# Patient Record
Sex: MALE | Race: WHITE | NOT HISPANIC OR LATINO | Employment: FULL TIME | URBAN - METROPOLITAN AREA
[De-identification: names, ages, dates, MRNs, and addresses within clinical notes are randomized per-mention and may not be internally consistent; named-entity substitution may affect disease eponyms.]

---

## 2017-01-25 ENCOUNTER — TELEPHONE (OUTPATIENT)
Dept: FAMILY MEDICINE | Facility: CLINIC | Age: 32
End: 2017-01-25

## 2017-01-25 NOTE — TELEPHONE ENCOUNTER
Dr. Acuna,    Patient with stomach flu symptoms.  Has vomited yesterday once and had 2 diarrhea stools today.  Has not been to work in 3 days.  Needing work note. I have started for you completion.Patient to be called when note is ready and he will  at our .  Patient advised to do good hand washing techniques and treat symptoms and stay well hydrated.  If not better in a few days to be seen.  Ayse ARANDA RN

## 2017-01-25 NOTE — TELEPHONE ENCOUNTER
Reason for call:  Patient reporting a symptom    Symptom or request: Pt has been vomiting, having diarrhea, body aches and fever X 4 days.  He wants to know if he should be seen in clinic?    Duration (how long have symptoms been present): 4 days    Have you been treated for this before? Yes    Additional comments:     Phone Number patient can be reached at:  Home number on file [phone] (home)    Best Time:  any    Can we leave a detailed message on this number:  YES    Call taken on 1/25/2017 at 2:41 PM by Becca Lemos

## 2017-01-25 NOTE — Clinical Note
January 25, 2017      Pop Garcia  [address]  [place] [address]      To Whom This May Concern:    Please excuse the patient from work due to an acute illness for the week of 1/23/17.   This is by patient report. He was not seen in clinic.     Thank you,      Sincerely,  Trae Acuna MD    Your [facility] Care Team

## 2017-01-26 ENCOUNTER — APPOINTMENT (OUTPATIENT)
Dept: GENERAL RADIOLOGY | Facility: CLINIC | Age: 32
End: 2017-01-26
Attending: FAMILY MEDICINE
Payer: COMMERCIAL

## 2017-01-26 ENCOUNTER — HOSPITAL ENCOUNTER (EMERGENCY)
Facility: CLINIC | Age: 32
Discharge: HOME | End: 2017-01-26
Attending: FAMILY MEDICINE | Admitting: FAMILY MEDICINE
Payer: COMMERCIAL

## 2017-01-26 VITALS
WEIGHT: 315 LBS | DIASTOLIC BLOOD PRESSURE: 93 MMHG | BODY MASS INDEX: 45.62 KG/M2 | HEART RATE: 123 BPM | OXYGEN SATURATION: 95 % | RESPIRATION RATE: 24 BRPM | TEMPERATURE: 101.3 F | SYSTOLIC BLOOD PRESSURE: 151 MMHG

## 2017-01-26 DIAGNOSIS — R50.9 FEBRILE ILLNESS, ACUTE: ICD-10-CM

## 2017-01-26 LAB
ALBUMIN SERPL-MCNC: 3.6 G/DL (ref 3.4–5)
ALBUMIN UR-MCNC: >600 MG/DL
ALP SERPL-CCNC: 81 U/L (ref 40–150)
ALT SERPL W P-5'-P-CCNC: 31 U/L (ref 0–70)
ANION GAP SERPL CALCULATED.3IONS-SCNC: 9 MMOL/L (ref 3–14)
APPEARANCE UR: ABNORMAL
AST SERPL W P-5'-P-CCNC: 28 U/L (ref 0–45)
BASOPHILS # BLD AUTO: 0 10E9/L (ref 0–0.2)
BASOPHILS NFR BLD AUTO: 0.2 %
BILIRUB DIRECT SERPL-MCNC: 0.1 MG/DL (ref 0–0.2)
BILIRUB SERPL-MCNC: 0.4 MG/DL (ref 0.2–1.3)
BILIRUB UR QL STRIP: NEGATIVE
BUN SERPL-MCNC: 12 MG/DL (ref 7–30)
CALCIUM SERPL-MCNC: 8.9 MG/DL (ref 8.5–10.1)
CHLORIDE SERPL-SCNC: 101 MMOL/L (ref 94–109)
CK SERPL-CCNC: 330 U/L (ref 30–300)
CO2 SERPL-SCNC: 24 MMOL/L (ref 20–32)
COLOR UR AUTO: YELLOW
CREAT SERPL-MCNC: 0.8 MG/DL (ref 0.66–1.25)
DEPRECATED S PYO AG THROAT QL EIA: NORMAL
DEPRECATED S PYO AG THROAT QL EIA: NORMAL
DIFFERENTIAL METHOD BLD: ABNORMAL
EOSINOPHIL # BLD AUTO: 0.1 10E9/L (ref 0–0.7)
EOSINOPHIL NFR BLD AUTO: 0.6 %
ERYTHROCYTE [DISTWIDTH] IN BLOOD BY AUTOMATED COUNT: 13.2 % (ref 10–15)
FLUAV+FLUBV AG SPEC QL: ABNORMAL
FLUAV+FLUBV AG SPEC QL: ABNORMAL
FLUAV+FLUBV AG SPEC QL: NORMAL
FLUAV+FLUBV AG SPEC QL: NORMAL
GFR SERPL CREATININE-BSD FRML MDRD: ABNORMAL ML/MIN/1.7M2
GLUCOSE SERPL-MCNC: 115 MG/DL (ref 70–99)
GLUCOSE UR STRIP-MCNC: NEGATIVE MG/DL
HCT VFR BLD AUTO: 50.1 % (ref 40–53)
HGB BLD-MCNC: 17.4 G/DL (ref 13.3–17.7)
HGB UR QL STRIP: ABNORMAL
IMM GRANULOCYTES # BLD: 0 10E9/L (ref 0–0.4)
IMM GRANULOCYTES NFR BLD: 0.3 %
KETONES UR STRIP-MCNC: NEGATIVE MG/DL
LACTATE BLD-SCNC: 1.5 MMOL/L (ref 0.7–2.1)
LEUKOCYTE ESTERASE UR QL STRIP: NEGATIVE
LYMPHOCYTES # BLD AUTO: 1 10E9/L (ref 0.8–5.3)
LYMPHOCYTES NFR BLD AUTO: 7.7 %
MCH RBC QN AUTO: 29.7 PG (ref 26.5–33)
MCHC RBC AUTO-ENTMCNC: 34.7 G/DL (ref 31.5–36.5)
MCV RBC AUTO: 86 FL (ref 78–100)
MICRO REPORT STATUS: NORMAL
MICRO REPORT STATUS: NORMAL
MONOCYTES # BLD AUTO: 1.6 10E9/L (ref 0–1.3)
MONOCYTES NFR BLD AUTO: 13.2 %
MUCOUS THREADS #/AREA URNS LPF: PRESENT /LPF
NEUTROPHILS # BLD AUTO: 9.7 10E9/L (ref 1.6–8.3)
NEUTROPHILS NFR BLD AUTO: 78 %
NITRATE UR QL: NEGATIVE
PH UR STRIP: 6 PH (ref 5–7)
PLATELET # BLD AUTO: 177 10E9/L (ref 150–450)
POTASSIUM SERPL-SCNC: 4 MMOL/L (ref 3.4–5.3)
PROT SERPL-MCNC: 8.3 G/DL (ref 6.8–8.8)
RBC # BLD AUTO: 5.85 10E12/L (ref 4.4–5.9)
RBC #/AREA URNS AUTO: 0 /HPF (ref 0–2)
SODIUM SERPL-SCNC: 134 MMOL/L (ref 133–144)
SP GR UR STRIP: 1.02 (ref 1–1.03)
SPECIMEN SOURCE: ABNORMAL
SPECIMEN SOURCE: NORMAL
SQUAMOUS #/AREA URNS AUTO: <1 /HPF (ref 0–1)
URN SPEC COLLECT METH UR: ABNORMAL
UROBILINOGEN UR STRIP-MCNC: NORMAL MG/DL (ref 0–2)
WBC # BLD AUTO: 12.4 10E9/L (ref 4–11)
WBC #/AREA URNS AUTO: 1 /HPF (ref 0–2)

## 2017-01-26 PROCEDURE — 96375 TX/PRO/DX INJ NEW DRUG ADDON: CPT

## 2017-01-26 PROCEDURE — 96361 HYDRATE IV INFUSION ADD-ON: CPT

## 2017-01-26 PROCEDURE — 87880 STREP A ASSAY W/OPTIC: CPT | Performed by: FAMILY MEDICINE

## 2017-01-26 PROCEDURE — 87081 CULTURE SCREEN ONLY: CPT | Performed by: FAMILY MEDICINE

## 2017-01-26 PROCEDURE — 99285 EMERGENCY DEPT VISIT HI MDM: CPT | Mod: 25

## 2017-01-26 PROCEDURE — 85025 COMPLETE CBC W/AUTO DIFF WBC: CPT | Performed by: FAMILY MEDICINE

## 2017-01-26 PROCEDURE — 87040 BLOOD CULTURE FOR BACTERIA: CPT | Performed by: FAMILY MEDICINE

## 2017-01-26 PROCEDURE — 83605 ASSAY OF LACTIC ACID: CPT | Performed by: FAMILY MEDICINE

## 2017-01-26 PROCEDURE — 80076 HEPATIC FUNCTION PANEL: CPT | Performed by: FAMILY MEDICINE

## 2017-01-26 PROCEDURE — 25000128 H RX IP 250 OP 636: Performed by: FAMILY MEDICINE

## 2017-01-26 PROCEDURE — 87804 INFLUENZA ASSAY W/OPTIC: CPT | Performed by: FAMILY MEDICINE

## 2017-01-26 PROCEDURE — 80048 BASIC METABOLIC PNL TOTAL CA: CPT | Performed by: FAMILY MEDICINE

## 2017-01-26 PROCEDURE — 71020 XR CHEST 2 VW: CPT

## 2017-01-26 PROCEDURE — 82550 ASSAY OF CK (CPK): CPT | Performed by: FAMILY MEDICINE

## 2017-01-26 PROCEDURE — 99284 EMERGENCY DEPT VISIT MOD MDM: CPT | Performed by: FAMILY MEDICINE

## 2017-01-26 PROCEDURE — 25000125 ZZHC RX 250: Performed by: FAMILY MEDICINE

## 2017-01-26 PROCEDURE — 96374 THER/PROPH/DIAG INJ IV PUSH: CPT

## 2017-01-26 PROCEDURE — 81001 URINALYSIS AUTO W/SCOPE: CPT | Performed by: FAMILY MEDICINE

## 2017-01-26 RX ORDER — KETOROLAC TROMETHAMINE 30 MG/ML
30 INJECTION, SOLUTION INTRAMUSCULAR; INTRAVENOUS ONCE
Status: COMPLETED | OUTPATIENT
Start: 2017-01-26 | End: 2017-01-26

## 2017-01-26 RX ORDER — NAPROXEN 500 MG/1
500 TABLET ORAL 2 TIMES DAILY WITH MEALS
Qty: 16 TABLET | Refills: 0 | Status: SHIPPED | OUTPATIENT
Start: 2017-01-26 | End: 2017-02-03

## 2017-01-26 RX ORDER — HYDROCODONE BITARTRATE AND ACETAMINOPHEN 5; 325 MG/1; MG/1
1-2 TABLET ORAL EVERY 4 HOURS PRN
Qty: 4 TABLET | Refills: 0 | Status: SHIPPED | OUTPATIENT
Start: 2017-01-26 | End: 2018-01-25

## 2017-01-26 RX ORDER — SODIUM CHLORIDE 9 MG/ML
1000 INJECTION, SOLUTION INTRAVENOUS CONTINUOUS
Status: DISCONTINUED | OUTPATIENT
Start: 2017-01-26 | End: 2017-01-26 | Stop reason: HOSPADM

## 2017-01-26 RX ORDER — LORAZEPAM 2 MG/ML
1 INJECTION INTRAMUSCULAR ONCE
Status: COMPLETED | OUTPATIENT
Start: 2017-01-26 | End: 2017-01-26

## 2017-01-26 RX ORDER — ONDANSETRON 2 MG/ML
4 INJECTION INTRAMUSCULAR; INTRAVENOUS ONCE
Status: COMPLETED | OUTPATIENT
Start: 2017-01-26 | End: 2017-01-26

## 2017-01-26 RX ADMIN — LORAZEPAM 1 MG: 2 INJECTION INTRAMUSCULAR; INTRAVENOUS at 10:54

## 2017-01-26 RX ADMIN — ONDANSETRON 4 MG: 2 INJECTION INTRAMUSCULAR; INTRAVENOUS at 10:05

## 2017-01-26 RX ADMIN — SODIUM CHLORIDE 1000 ML: 9 INJECTION, SOLUTION INTRAVENOUS at 11:11

## 2017-01-26 RX ADMIN — KETOROLAC TROMETHAMINE 30 MG: 30 INJECTION, SOLUTION INTRAMUSCULAR at 10:06

## 2017-01-26 RX ADMIN — SODIUM CHLORIDE 1000 ML: 9 INJECTION, SOLUTION INTRAVENOUS at 09:28

## 2017-01-26 NOTE — ED NOTES
fever since Sunday, fatigue and body aches, no abd pain, 2 episodes of diarrhea, nausea and appetite loss, HA, low back ache

## 2017-01-26 NOTE — ED AVS SNAPSHOT
[facility] Emergency Department    [address] [address]    Phone:  [phone]    Fax:  [phone]                                       Pop Garcia   MRN: [id number]    Department:  [facility] Emergency Department   Date of Visit:  1/26/2017           Patient Information     Date Of Birth          1985        Your diagnoses for this visit were:     Febrile illness, acute Unclear cause.  xray, labs reassuring.  Return for worsening and recheck for persistent fever within 48 hours. recheck urine for protein.  Stay hydrated with >64 oz/day - avoid caffeine.   tylenol or motrin for fever.  Muscle aches are common with this, but low back pain could be referred from elsewhere such as inside the abdomen.  I would strongly consider CT abd and pelvis (despite normal abdominal exam) if fever persists    Febrile illness, acute        You were seen by Aurelio Dumont MD.      Follow-up Information     Follow up with Clinic, [facility] In 2 days.    Contact information:    [address] [place] [address] [phone]          Follow up with [facility] Emergency Department.    Specialty:  EMERGENCY MEDICINE    Why:  As needed, If symptoms worsen    Contact information:    [address] [place] [address] [phone]    Additional information:    The medical center is located at   [address] [place] [address]. (between [address] and   [address] in [place], [place] north   of [place]).        Discharge Instructions           ICD-10-CM    1. Febrile illness, acute R50.9 CK total    Unclear cause.  xray, labs reassuring.  Return for worsening and recheck for persistent fever within 48 hours. recheck urine for protein.  Stay hydrated with >64 oz/day - avoid caffeine.   tylenol or motrin for fever.  Muscle aches are common with this, but low back pain could be referred from elsewhere such as inside the abdomen.  I would strongly consider CT abd and  pelvis (despite normal abdominal exam) if fever persists   2. Febrile illness, acute R50.9 CK total         Febrile Illness, Uncertain Cause (Adult)  You have a fever, but the cause is not certain. A fever is a natural reaction of the body to an illness such as infection due to a virus or bacteria. In most cases, the temperature itself is not harmful. It actually helps the body fight infections. A fever does not need to be treated unless you feel very uncomfortable.  Sometimes a fever can be an early sign of a more serious infection, so make sure to follow up if your condition worsens.  Home care  Unless given other instructions by your healthcare provider, follow these guidelines when caring for yourself at home.  General care    If your symptoms are severe, rest at home for the first 2 to 3 days. When you resume activity, don't let yourself get too tired.    Do not smoke. Also avoid being exposed to secondhand smoke.    Your appetite may be poor, so a light diet is fine. Avoid dehydration by drinking 6 to 8 glasses of fluids per day (such as water, soft drinks, sports drinks, juices, tea, or soup). Extra fluids will help loosen secretions in the nose and lungs.  Medicines    You can take acetaminophen or ibuprofen for pain, unless you were given a different fever-reducing/pain medicine to use. (Note: If you have chronic liver or kidney disease or have ever had a stomach ulcer or gastrointestinal bleeding, talk with your healthcare provider before using these medicines. Also talk to your provider if you are taking medicine to prevent blood clots.) Aspirin should never be given to anyone younger than 18 years of age who is ill with a viral infection or fever. It may cause severe liver or brain damage.    If you were given antibiotics, take them until they are used up, or your healthcare provider tells you to stop. It is important to finish the antibiotics even though you feel better. This is to make sure the  infection has cleared. Be aware that antibiotics are not usually given for a fever with an unknown cause.    Over-the-counter medicines will not shorten the duration of the illness. However, they may be helpful for the following symptoms: cough, sore throat, or nasal and sinus congestion. Ask your pharmacist for product suggestions. (Note: Do not use decongestants if you have high blood pressure.)  Follow-up care  Follow up with your healthcare provider, or as advised.    If a culture was done, you will be notified if your treatment needs to be changed. You can call as directed for the results.    If X-rays, a CT, or an ultrasound were done, a specialist will review them. You will be notified of any findings that may affect your care.  Call 911  Contact emergency services right away if any of these occur:    Trouble breathing or swallowing, or wheezing    Chest pain    Confusion    Extreme drowsiness or trouble awakening    Fainting or loss of consciousness    Rapid heart rate    Low blood pressure    Vomiting blood, or large amounts of blood in stool    Seizure  When to seek medical advice  Call your healthcare provider right away if any of these occur:    Cough with lots of colored sputum (mucus) or blood in your sputum    Severe headache    Face, neck, throat, or ear pain    Feeling drowsy    Abdominal pain    Repeated vomiting or diarrhea    Joint pain or a new rash    Burning when urinating    Fever of 100.4 F (38 C) or higher, that does not get better after taking fever-reducing medicine    Feeling weak or dizzy    4700-1478 [employer]. [address] [place] [address], [place] [address]. All rights reserved. This information is not intended as a substitute for professional medical care. Always follow your healthcare professional's instructions.          24 Hour Appointment Hotline       To make an appointment at any [facility], call [phone] ([phone]). If you don't have a family doctor  or clinic, we will help you find one. [facility] are conveniently located to serve the needs of you and your family.             Review of your medicines      START taking        Dose / Directions Last dose taken    HYDROcodone-acetaminophen 5-325 MG per tablet   Commonly known as:  NORCO   Dose:  1-2 tablet   Quantity:  4 tablet        Take 1-2 tablets by mouth every 4 hours as needed for moderate to severe pain   Refills:  0        naproxen 500 MG tablet   Commonly known as:  NAPROSYN   Dose:  500 mg   Quantity:  16 tablet        Take 1 tablet (500 mg) by mouth 2 times daily (with meals) for 8 days   Refills:  0                Prescriptions were sent or printed at these locations (2 Prescriptions)                   [facility] [place], [place] - [address]   [address] [address]    Telephone:  [phone]   Fax:  [phone]   Hours:                  E-Prescribed (1 of 2)         naproxen (NAPROSYN) 500 MG tablet                 Printed at Department/Unit printer (1 of 2)         HYDROcodone-acetaminophen (NORCO) 5-325 MG per tablet                Procedures and tests performed during your visit     Procedure/Test Number of Times Performed    Basic metabolic panel 1    Beta strep group A culture 1    Blood culture 2    CBC with platelets differential 1    CK total 1    Hepatic panel 1    Influenza A/B antigen 2    Lactic acid whole blood 1    Rapid Strep Screen 1    Rapid strep screen 1    UA with Microscopic reflex to Culture 1    XR Chest 2 Views 1      Orders Needing Specimen Collection     None      Pending Results     Date and Time Order Name Status Description    1/26/2017 1224 CK total In process     1/26/2017 1030 Beta strep group A culture In process     1/26/2017 0956 Blood culture In process     1/26/2017 0956 Blood culture In process             Pending Culture Results     Date and Time Order Name Status Description    1/26/2017 1030 Beta strep group A culture  In process     1/26/2017 0956 Blood culture In process     1/26/2017 0956 Blood culture In process        Test Results from your hospital stay           1/26/2017  9:30 AM - Interface, Flexilab Results      Component Results     Component Value Ref Range & Units Status    WBC 12.4 (H) 4.0 - 11.0 10e9/L Final    RBC Count 5.85 4.4 - 5.9 10e12/L Final    Hemoglobin 17.4 13.3 - 17.7 g/dL Final    Hematocrit 50.1 40.0 - 53.0 % Final    MCV 86 78 - 100 fl Final    MCH 29.7 26.5 - 33.0 pg Final    MCHC 34.7 31.5 - 36.5 g/dL Final    RDW 13.2 10.0 - 15.0 % Final    Platelet Count 177 150 - 450 10e9/L Final    Diff Method Automated Method  Final    % Neutrophils 78.0 % Final    % Lymphocytes 7.7 % Final    % Monocytes 13.2 % Final    % Eosinophils 0.6 % Final    % Basophils 0.2 % Final    % Immature Granulocytes 0.3 % Final    Absolute Neutrophil 9.7 (H) 1.6 - 8.3 10e9/L Final    Absolute Lymphocytes 1.0 0.8 - 5.3 10e9/L Final    Absolute Monocytes 1.6 (H) 0.0 - 1.3 10e9/L Final    Absolute Eosinophils 0.1 0.0 - 0.7 10e9/L Final    Absolute Basophils 0.0 0.0 - 0.2 10e9/L Final    Abs Immature Granulocytes 0.0 0 - 0.4 10e9/L Final         1/26/2017  9:44 AM - Interface, Flexilab Results      Component Results     Component Value Ref Range & Units Status    Sodium 134 133 - 144 mmol/L Final    Potassium 4.0 3.4 - 5.3 mmol/L Final    Chloride 101 94 - 109 mmol/L Final    Carbon Dioxide 24 20 - 32 mmol/L Final    Anion Gap 9 3 - 14 mmol/L Final    Glucose 115 (H) 70 - 99 mg/dL Final    Urea Nitrogen 12 7 - 30 mg/dL Final    Creatinine 0.80 0.66 - 1.25 mg/dL Final    GFR Estimate >90  Non  GFR Calc   >60 mL/min/1.7m2 Final    GFR Estimate If Black >90   GFR Calc   >60 mL/min/1.7m2 Final    Calcium 8.9 8.5 - 10.1 mg/dL Final         1/26/2017  9:30 AM - Interface, Flexilab Results      Component Results     Component Value Ref Range & Units Status    Lactic Acid 1.5 0.7 - 2.1 mmol/L Final          1/26/2017 11:32 AM - Interface, Flexilab Results      Component Results     Component    Specimen Description    Throat    Rapid Strep A Screen    Canceled, Test credited Specimen improperly labeled    Micro Report Status    FINAL 01/26/2017 1/26/2017 11:42 AM - Interface, Flexilab Results      Component Results     Component Value Ref Range & Units Status    Color Urine Yellow  Final    Appearance Urine Slightly Cloudy  Final    Glucose Urine Negative NEG mg/dL Final    Bilirubin Urine Negative NEG Final    Ketones Urine Negative NEG mg/dL Final    Specific Gravity Urine 1.020 1.003 - 1.035 Final    Blood Urine Trace (A) NEG Final    pH Urine 6.0 5.0 - 7.0 pH Final    Protein Albumin Urine >600 (A) NEG mg/dL Final    Urobilinogen mg/dL Normal 0.0 - 2.0 mg/dL Final    Nitrite Urine Negative NEG Final    Leukocyte Esterase Urine Negative NEG Final    Source Midstream Urine  Final    WBC Urine 1 0 - 2 /HPF Final    RBC Urine 0 0 - 2 /HPF Final    Squamous Epithelial /HPF Urine <1 0 - 1 /HPF Final    Mucous Urine Present (A) NEG /LPF Final         1/26/2017 10:26 AM - Interface, Radiant Ib      Narrative     XR CHEST 2 VW  1/26/2017 10:23 AM    HISTORY:  cough, fever    COMPARISON:  2/20/2012        Impression     IMPRESSION:  Negative.     ELENA BLANCHARD MD         1/26/2017 10:30 AM - Interface, Flexilab Results      Component Results     Component Value Ref Range & Units Status    Bilirubin Direct 0.1 0.0 - 0.2 mg/dL Final    Bilirubin Total 0.4 0.2 - 1.3 mg/dL Final    Albumin 3.6 3.4 - 5.0 g/dL Final    Protein Total 8.3 6.8 - 8.8 g/dL Final    Alkaline Phosphatase 81 40 - 150 U/L Final    ALT 31 0 - 70 U/L Final    AST 28 0 - 45 U/L Final         1/26/2017 11:17 AM - Interface, Flexilab Results      Component Results     Component Value Ref Range & Units Status    Influenza A/B Agn Specimen Nasal  Final    Influenza A  NEG Final    Canceled, Test credited   Specimen improperly labeled   Test results  must be correlated with clinical data. If necessary, results   should be confirmed by a molecular assay or viral culture.   (A)    Influenza B  NEG Final    Canceled, Test credited   Specimen improperly labeled   Test results must be correlated with clinical data. If necessary, results   should be confirmed by a molecular assay or viral culture.   (A)         1/26/2017 11:04 AM - Interface, Flexilab Results         1/26/2017 11:05 AM - Interface, Flexilab Results         1/26/2017 11:13 AM - Interface, Flexilab Results      Component Results     Component Value Ref Range & Units Status    Influenza A/B Agn Specimen Nasopharyngeal  Final    Influenza A  NEG Final    Negative   Test results must be correlated with clinical data. If necessary, results   should be confirmed by a molecular assay or viral culture.      Influenza B  NEG Final    Negative   Test results must be correlated with clinical data. If necessary, results   should be confirmed by a molecular assay or viral culture.           1/26/2017 11:02 AM - Interface, Flexilab Results      Component Results     Component    Specimen Description    Throat    Rapid Strep A Screen    NEGATIVE: No Group A streptococcal antigen detected by immunoassay, await   culture report.      Micro Report Status    FINAL 01/26/2017 1/26/2017 11:07 AM - Interface, Flexilab Results         1/26/2017 12:32 PM - Interface, Flexilab Results                Thank you for choosing [facility]       Thank you for choosing [facility] for your care. Our goal is always to provide you with excellent care. Hearing back from our patients is one way we can continue to improve our services. Please take a few minutes to complete the written survey that you may receive in the mail after you visit with us. Thank you!        [employer] Information     [employer] lets you send messages to your doctor, view your test results, renew your prescriptions, schedule appointments and more. To sign up, go to  [email or web address] . Click on \"Log in\" on the left side of the screen, which will take you to the Welcome page. Then click on \"Sign up Now\" on the right side of the page.     You will be asked to enter the access code listed below, as well as some personal information. Please follow the directions to create your username and password.     Your access code is: [email or web address]  Expires: 2017  6:27 PM     Your access code will  in 90 days. If you need help or a new code, please call your [place] clinic or [phone].        Care EveryWhere ID     This is your Care EveryWhere ID. This could be used by other organizations to access your [place] medical records  [id number]        After Visit Summary       This is your record. Keep this with you and show to your community pharmacist(s) and doctor(s) at your next visit.                  "

## 2017-01-26 NOTE — LETTER
[facility] EMERGENCY DEPARTMENT  [address] [address]  [phone]    2017    Pop Garcia  [address] [address]  [phone] (home)     : 1985      To Whom it may concern:    Pop Garcia was seen in our Emergency Department today, 2017.  I expect his condition to improve over the next 3 days.  He may return to work/school when improved.    Sincerely,        Aurelio Dumont

## 2017-01-26 NOTE — ED AVS SNAPSHOT
[facility] Emergency Department    [address] [address]    Phone:  [phone]    Fax:  [phone]                                       Pop Garcia   MRN: [id number]    Department:  [facility] Emergency Department   Date of Visit:  1/26/2017           After Visit Summary Signature Page     I have received my discharge instructions, and my questions have been answered. I have discussed any challenges I see with this plan with the nurse or doctor.    ..........................................................................................................................................  Patient/Patient Representative Signature      ..........................................................................................................................................  Patient Representative Print Name and Relationship to Patient    ..................................................               ................................................  Date                                            Time    ..........................................................................................................................................  Reviewed by Signature/Title    ...................................................              ..............................................  Date                                                            Time

## 2017-01-26 NOTE — ED PROVIDER NOTES
History     Chief Complaint   Patient presents with     Fever     fever since Sunday, fatigue and body aches, no abd pain, 2 episodes of diarrhea, nausea and appetite loss, HA, low back ache     HPI  Pop Garcia is a 31 year old male who presents with onset of fever since monday AM - tactile warmth.  with low back pain as well as diffuse myalgias, weakness, fatigue, sweats and chills.  Nausea without vomiting. Tolerating liquids.  urinating - decreased.    diarrhea 1-2x on tuesday.  No blood or mucous.  normalized.    No recent antibiotics.  no bad food intakes.  No travel other than [place] in [place] and [place].  No contagious contacts.  unable to get comfortable. unable to sleep  chronic sinus symptoms.  headache - pressure sensation - facial .  No rhinorrhea  occl cough he attributes smoking  denies sympathomimetics    Past Surgical History   Procedure Laterality Date     Surgical history of -   1991     tonsils & adenoids     Gastroscopy,fl  10/2006     eosphagitis,  nonpatent sphincter     [place] screening  8/15/2012     Procedure: WAVESCAN SCREENING;  BILATERAL WAVESCAN SCREENING ;  Surgeon: Luis Johnson MD;  Location: [facility]     Lasik customvue bilateral  8/15/2012     Procedure: LASIK CUSTOMVUE BILATERAL;  BILATERAL LASIK CUSTOMVUE  WITH INTRALASE ;  Surgeon: Luis Johnson MD;  Location: [facility]     Social History   Substance Use Topics     Smoking status: Current Every Day Smoker -- 0.50 packs/day for 10 years     Types: Cigarettes     Smokeless tobacco: Former User     Types: Chew     Quit date: 04/01/2015      Comment: 1/2 ppd as of 1/5/2011     Alcohol Use: No         Patient Active Problem List   Diagnosis     Tobacco use disorder     Morbid obesity (H)     Reflux esophagitis     Left axis deviation     CARDIOVASCULAR SCREENING; LDL GOAL LESS THAN 130     Vitamin D deficiency     Attention deficit hyperactivity disorder (ADHD)     Marijuana use, continuous     Chronic frontal sinusitis      Chronic rhinitis     Current Outpatient Prescriptions   Medication Sig Dispense Refill     fluticasone (FLONASE) 50 MCG/ACT nasal spray Spray 1-2 sprays into both nostrils daily 16 g 11     Multiple Vitamin (MULTIVITAMIN) per tablet Take 1 tablet by mouth daily. 100 tablet 12      No Known Allergies    I have reviewed the Medications, Allergies, Past Medical and Surgical History, and Social History in the Epic system.    Review of Systems    No  ear pain  No chest pain, palpitations or shortness of breath  No abdominal pain, vomiting,  constipation or blood in the stool or black tarry stools  No dysuria, urgency,  frequency or hematuria  No new rashes  No headaches or vision change  No enlarged lymph nodes  Review of systems otherwise negative     Physical Exam   BP: 133/78 mmHg  Pulse: 125  Temp: 101.3  F (38.5  C)  Resp: 20  Weight: (!) 167.831 kg (370 lb)  SpO2: 94 %  Physical Exam   Constitutional: He appears distressed.   Eyes: Conjunctivae are normal.   Neck: Neck supple.   Cardiovascular: Normal rate and regular rhythm.  Exam reveals no gallop and no friction rub.    No murmur heard.  Pulmonary/Chest: Effort normal and breath sounds normal. No respiratory distress. He has no wheezes. He has no rales.   Abdominal: Soft. Bowel sounds are normal. He exhibits no distension. There is no tenderness. There is no rebound and no guarding.   Genitourinary: Prostate normal.   Musculoskeletal: He exhibits no edema.   Neurological: He is alert. He exhibits normal muscle tone.   Skin: No rash noted. He is diaphoretic.   low back full range of motion that is painless    ED Course   Procedures             Critical Care time:  none               Results for orders placed or performed during the hospital encounter of 01/26/17   XR Chest 2 Views    Narrative    XR CHEST 2 VW  1/26/2017 10:23 AM    HISTORY:  cough, fever    COMPARISON:  2/20/2012      Impression    IMPRESSION:  Negative.     ELENA BLANCHARD MD   CBC with  platelets differential   Result Value Ref Range    WBC 12.4 (H) 4.0 - 11.0 10e9/L    RBC Count 5.85 4.4 - 5.9 10e12/L    Hemoglobin 17.4 13.3 - 17.7 g/dL    Hematocrit 50.1 40.0 - 53.0 %    MCV 86 78 - 100 fl    MCH 29.7 26.5 - 33.0 pg    MCHC 34.7 31.5 - 36.5 g/dL    RDW 13.2 10.0 - 15.0 %    Platelet Count 177 150 - 450 10e9/L    Diff Method Automated Method     % Neutrophils 78.0 %    % Lymphocytes 7.7 %    % Monocytes 13.2 %    % Eosinophils 0.6 %    % Basophils 0.2 %    % Immature Granulocytes 0.3 %    Absolute Neutrophil 9.7 (H) 1.6 - 8.3 10e9/L    Absolute Lymphocytes 1.0 0.8 - 5.3 10e9/L    Absolute Monocytes 1.6 (H) 0.0 - 1.3 10e9/L    Absolute Eosinophils 0.1 0.0 - 0.7 10e9/L    Absolute Basophils 0.0 0.0 - 0.2 10e9/L    Abs Immature Granulocytes 0.0 0 - 0.4 10e9/L   Basic metabolic panel   Result Value Ref Range    Sodium 134 133 - 144 mmol/L    Potassium 4.0 3.4 - 5.3 mmol/L    Chloride 101 94 - 109 mmol/L    Carbon Dioxide 24 20 - 32 mmol/L    Anion Gap 9 3 - 14 mmol/L    Glucose 115 (H) 70 - 99 mg/dL    Urea Nitrogen 12 7 - 30 mg/dL    Creatinine 0.80 0.66 - 1.25 mg/dL    GFR Estimate >90  Non  GFR Calc   >60 mL/min/1.7m2    GFR Estimate If Black >90   GFR Calc   >60 mL/min/1.7m2    Calcium 8.9 8.5 - 10.1 mg/dL   Lactic acid whole blood   Result Value Ref Range    Lactic Acid 1.5 0.7 - 2.1 mmol/L   UA with Microscopic reflex to Culture   Result Value Ref Range    Color Urine Yellow     Appearance Urine Slightly Cloudy     Glucose Urine Negative NEG mg/dL    Bilirubin Urine Negative NEG    Ketones Urine Negative NEG mg/dL    Specific Gravity Urine 1.020 1.003 - 1.035    Blood Urine Trace (A) NEG    pH Urine 6.0 5.0 - 7.0 pH    Protein Albumin Urine >600 (A) NEG mg/dL    Urobilinogen mg/dL Normal 0.0 - 2.0 mg/dL    Nitrite Urine Negative NEG    Leukocyte Esterase Urine Negative NEG    Source Midstream Urine     WBC Urine 1 0 - 2 /HPF    RBC Urine 0 0 - 2 /HPF    Squamous  Epithelial /HPF Urine <1 0 - 1 /HPF    Mucous Urine Present (A) NEG /LPF   Hepatic panel   Result Value Ref Range    Bilirubin Direct 0.1 0.0 - 0.2 mg/dL    Bilirubin Total 0.4 0.2 - 1.3 mg/dL    Albumin 3.6 3.4 - 5.0 g/dL    Protein Total 8.3 6.8 - 8.8 g/dL    Alkaline Phosphatase 81 40 - 150 U/L    ALT 31 0 - 70 U/L    AST 28 0 - 45 U/L   CK total   Result Value Ref Range    CK Total 330 (H) 30 - 300 U/L   Rapid Strep Screen   Result Value Ref Range    Specimen Description Throat     Rapid Strep A Screen       Canceled, Test credited Specimen improperly labeled    Micro Report Status FINAL 01/26/2017    Influenza A/B antigen   Result Value Ref Range    Influenza A/B Agn Specimen Nasal     Influenza A (A) NEG     Canceled, Test credited   Specimen improperly labeled   Test results must be correlated with clinical data. If necessary, results   should be confirmed by a molecular assay or viral culture.      Influenza B (A) NEG     Canceled, Test credited   Specimen improperly labeled   Test results must be correlated with clinical data. If necessary, results   should be confirmed by a molecular assay or viral culture.     Influenza A/B antigen   Result Value Ref Range    Influenza A/B Agn Specimen Nasopharyngeal     Influenza A  NEG     Negative   Test results must be correlated with clinical data. If necessary, results   should be confirmed by a molecular assay or viral culture.      Influenza B  NEG     Negative   Test results must be correlated with clinical data. If necessary, results   should be confirmed by a molecular assay or viral culture.     Rapid strep screen   Result Value Ref Range    Specimen Description Throat     Rapid Strep A Screen       NEGATIVE: No Group A streptococcal antigen detected by immunoassay, await   culture report.      Micro Report Status FINAL 01/26/2017          Assessments & Plan (with Medical Decision Making)     MDM: Pop Garcia is a 31 year old male who presented with febrile  illness of unclear etiology. He has diffuse myalgias especially in the low back  but well below the level of the flank, with a normal prostate exam and negative abdominal exam and no genitourinary symptoms.  We discussed obtaining a CT of the [place] and pelvis to exclude retroperitoneal cause.  he declines this at this time but will return for worsening. He understands the potential risk of missing pelvic appendix or other findings that could be more significant. Again his abdominal exam is benign and he is non-toxic in appearance.  He will return immediately for worsening.  I've asked him to follow up for persistent fever within the next 24 to 48 hours . Blood cultures were obtained and are pending.   I have reviewed the nursing notes.    I have reviewed the findings, diagnosis, plan and need for follow up with the patient.    New Prescriptions    No medications on file       Final diagnoses:   Febrile illness, acute - Unclear cause.  xray, labs reassuring.  Return for worsening and recheck for persistent fever within 48 hours. recheck urine for protein.  Stay hydrated with >64 oz/day - avoid caffeine.   tylenol or motrin for fever.  Muscle aches are common with this, but low back pain could be referred from elsewhere such as inside the abdomen.  I would strongly consider CT abd and pelvis (despite normal abdominal exam) if fever persists       1/26/2017   [facility] EMERGENCY DEPARTMENT      Aurelio Dumont MD  01/26/17 [id number]

## 2017-01-26 NOTE — TELEPHONE ENCOUNTER
Detailed message was left for pt   Letting him know the letter requested was ready for  at the [facility]    And can be  between the hours of 7 am and 5 pm Monday thru Friday   If there are questions please call the care team at [phone]    Pat Soto  [facility] [place]

## 2017-01-26 NOTE — DISCHARGE INSTRUCTIONS
ICD-10-CM    1. Febrile illness, acute R50.9 CK total    Unclear cause.  xray, labs reassuring.  Return for worsening and recheck for persistent fever within 48 hours. recheck urine for protein.  Stay hydrated with >64 oz/day - avoid caffeine.   tylenol or motrin for fever.  Muscle aches are common with this, but low back pain could be referred from elsewhere such as inside the abdomen.  I would strongly consider CT abd and pelvis (despite normal abdominal exam) if fever persists   2. Febrile illness, acute R50.9 CK total         Febrile Illness, Uncertain Cause (Adult)  You have a fever, but the cause is not certain. A fever is a natural reaction of the body to an illness such as infection due to a virus or bacteria. In most cases, the temperature itself is not harmful. It actually helps the body fight infections. A fever does not need to be treated unless you feel very uncomfortable.  Sometimes a fever can be an early sign of a more serious infection, so make sure to follow up if your condition worsens.  Home care  Unless given other instructions by your healthcare provider, follow these guidelines when caring for yourself at home.  General care    If your symptoms are severe, rest at home for the first 2 to 3 days. When you resume activity, don't let yourself get too tired.    Do not smoke. Also avoid being exposed to secondhand smoke.    Your appetite may be poor, so a light diet is fine. Avoid dehydration by drinking 6 to 8 glasses of fluids per day (such as water, soft drinks, sports drinks, juices, tea, or soup). Extra fluids will help loosen secretions in the nose and lungs.  Medicines    You can take acetaminophen or ibuprofen for pain, unless you were given a different fever-reducing/pain medicine to use. (Note: If you have chronic liver or kidney disease or have ever had a stomach ulcer or gastrointestinal bleeding, talk with your healthcare provider before using these medicines. Also talk to your  provider if you are taking medicine to prevent blood clots.) Aspirin should never be given to anyone younger than 18 years of age who is ill with a viral infection or fever. It may cause severe liver or brain damage.    If you were given antibiotics, take them until they are used up, or your healthcare provider tells you to stop. It is important to finish the antibiotics even though you feel better. This is to make sure the infection has cleared. Be aware that antibiotics are not usually given for a fever with an unknown cause.    Over-the-counter medicines will not shorten the duration of the illness. However, they may be helpful for the following symptoms: cough, sore throat, or nasal and sinus congestion. Ask your pharmacist for product suggestions. (Note: Do not use decongestants if you have high blood pressure.)  Follow-up care  Follow up with your healthcare provider, or as advised.    If a culture was done, you will be notified if your treatment needs to be changed. You can call as directed for the results.    If X-rays, a CT, or an ultrasound were done, a specialist will review them. You will be notified of any findings that may affect your care.  Call 911  Contact emergency services right away if any of these occur:    Trouble breathing or swallowing, or wheezing    Chest pain    Confusion    Extreme drowsiness or trouble awakening    Fainting or loss of consciousness    Rapid heart rate    Low blood pressure    Vomiting blood, or large amounts of blood in stool    Seizure  When to seek medical advice  Call your healthcare provider right away if any of these occur:    Cough with lots of colored sputum (mucus) or blood in your sputum    Severe headache    Face, neck, throat, or ear pain    Feeling drowsy    Abdominal pain    Repeated vomiting or diarrhea    Joint pain or a new rash    Burning when urinating    Fever of 100.4 F (38 C) or higher, that does not get better after taking fever-reducing  medicine    Feeling weak or dizzy    8778-0951 [employer]. [address] [place] [address], [place] [address]. All rights reserved. This information is not intended as a substitute for professional medical care. Always follow your healthcare professional's instructions.

## 2017-01-26 NOTE — ED NOTES
Patient states body aches, chills, sweats, sinus pressure, weak and feverish (does not know how high temp went PTA)

## 2017-01-28 ENCOUNTER — HOSPITAL ENCOUNTER (EMERGENCY)
Facility: CLINIC | Age: 32
Discharge: HOME | End: 2017-01-28
Attending: NURSE PRACTITIONER | Admitting: NURSE PRACTITIONER
Payer: COMMERCIAL

## 2017-01-28 VITALS
DIASTOLIC BLOOD PRESSURE: 77 MMHG | BODY MASS INDEX: 45.62 KG/M2 | RESPIRATION RATE: 18 BRPM | TEMPERATURE: 99.7 F | SYSTOLIC BLOOD PRESSURE: 134 MMHG | OXYGEN SATURATION: 98 % | WEIGHT: 315 LBS

## 2017-01-28 DIAGNOSIS — J02.9 ACUTE PHARYNGITIS, UNSPECIFIED ETIOLOGY: ICD-10-CM

## 2017-01-28 LAB
BACTERIA SPEC CULT: NORMAL
DEPRECATED S PYO AG THROAT QL EIA: NORMAL
HETEROPH AB SER QL: NEGATIVE
MICRO REPORT STATUS: NORMAL
MICRO REPORT STATUS: NORMAL
SPECIMEN SOURCE: NORMAL
SPECIMEN SOURCE: NORMAL

## 2017-01-28 PROCEDURE — 99213 OFFICE O/P EST LOW 20 MIN: CPT

## 2017-01-28 PROCEDURE — 86308 HETEROPHILE ANTIBODY SCREEN: CPT | Performed by: NURSE PRACTITIONER

## 2017-01-28 PROCEDURE — 99213 OFFICE O/P EST LOW 20 MIN: CPT | Performed by: NURSE PRACTITIONER

## 2017-01-28 PROCEDURE — 87081 CULTURE SCREEN ONLY: CPT | Performed by: NURSE PRACTITIONER

## 2017-01-28 PROCEDURE — 87880 STREP A ASSAY W/OPTIC: CPT | Performed by: FAMILY MEDICINE

## 2017-01-28 PROCEDURE — 36415 COLL VENOUS BLD VENIPUNCTURE: CPT | Performed by: NURSE PRACTITIONER

## 2017-01-28 RX ORDER — SODIUM CHLORIDE 9 MG/ML
1000 INJECTION, SOLUTION INTRAVENOUS CONTINUOUS
Status: DISCONTINUED | OUTPATIENT
Start: 2017-01-28 | End: 2017-01-28

## 2017-01-28 RX ORDER — DEXAMETHASONE 4 MG/1
10 TABLET ORAL 2 TIMES DAILY WITH MEALS
Qty: 2.5 TABLET | Refills: 0 | Status: SHIPPED | OUTPATIENT
Start: 2017-01-28 | End: 2018-01-25

## 2017-01-28 ASSESSMENT — ENCOUNTER SYMPTOMS
ACTIVITY CHANGE: 1
APPETITE CHANGE: 1
UNEXPECTED WEIGHT CHANGE: 1
FEVER: 1
VOMITING: 0
WEAKNESS: 1
ABDOMINAL PAIN: 0
SORE THROAT: 1
FATIGUE: 1
DIARRHEA: 1
BACK PAIN: 0
BLOOD IN STOOL: 0
RESPIRATORY NEGATIVE: 1
CARDIOVASCULAR NEGATIVE: 1
MYALGIAS: 1
CHILLS: 1

## 2017-01-28 NOTE — DISCHARGE INSTRUCTIONS
Viral Pharyngitis (Sore Throat)    You (or your child, if your child is the patient) have pharyngitis (sore throat). This infection is caused by a virus. It can cause throat pain that is worse when swallowing, aching all over, headache, and fever. The infection may be spread by coughing, kissing, or touching others after touching your mouth or nose. Antibiotic medications do not work against viruses, so they are not used for treating this condition.  Home care    If your symptoms are severe, rest at home. Return to work or school when you feel well enough.     Drink plenty of fluids to avoid dehydration.    For children: Use acetaminophen for fever, fussiness or discomfort. In infants over six months of age, you may use ibuprofen instead of acetaminophen. (NOTE: If your child has chronic liver or kidney disease or ever had a stomach ulcer or GI bleeding, talk with your doctor before using these medicines.) (NOTE: Aspirin should never be used in anyone under 18 years of age who is ill with a fever. It may cause severe liver damage.)     For adults: You may use acetaminophen or ibuprofen to control pain or fever, unless another medicine was prescribed for this. (NOTE: If you have chronic liver or kidney disease or ever had a stomach ulcer or GI bleeding, talk with your doctor before using these medicines.)    Throat lozenges or numbing throat sprays can help reduce pain. Gargling with warm salt water will also help reduce throat pain. For this, dissolve 1/2 teaspoon of salt in 1 glass of warm water. To help soothe a sore throat, children can sip on juice or a popsicle. Children 5 years and older can also suck on a lollipop or hard candy.    Avoid salty or spicy foods, which can be irritating to the throat.  Follow-up care  Follow up with your healthcare provider or our staff if you are not improving over the next week.  When to seek medical advice  Call your healthcare provider right away if any of these  occur:    Fever as directed by your doctor.  For children, seek care if:    Your child is of any age and has repeated fevers above 104 F (40 C).    Your child is younger than 2 years of age and has a fever of 100.4 F (38 C) that continues for more than 1 day.    Your child is 2 years old or older and has a fever of 100.4 F (38 C) that continues for more than 3 days.    New or worsening ear pain, sinus pain, or headache    Painful lumps in the back of neck    Stiff neck    Lymph nodes are getting larger    Inability to swallow liquids, excessive drooling, or inability to open mouth wide due to throat pain    Signs of dehydration (very dark urine or no urine, sunken eyes, dizziness)    Trouble breathing or noisy breathing    Muffled voice    New rash    Child appears to be getting sicker    1154-2758 [employer]. [address] [place] [address]. All rights reserved. This information is not intended as a substitute for professional medical care. Always follow your healthcare professional's instructions.

## 2017-01-28 NOTE — LETTER
[facility] EMERGENCY DEPARTMENT  [address] [address]  [phone]          January 28, 2017    RE:  Pop Garcia                                                                                                                                                       [address]  [facility] [address]            To whom it may concern:    Pop Garcia is under my professional care for Acute pharyngitis, unspecified etiology He  may return to work with the following: {WC ABLE/UNABLE:093868}    When the patient returns to work, the following restrictions apply until ***:  A) Bend: {WC FREQUENCY:507269}  B) Squat: {WC FREQUENCY:373171}  C) Walk/Stand: {WC FREQUENCY:880477}  D) Reach Above Shoulders: {WC FREQUENCY:599678}  E) Lift, carry, push, and pull no more than:  {WC LBS:671560}{WORKABILITY LIST :669538} on or about ***.      Sincerely,         {PROVIDER CREDENTIALS:350775}

## 2017-01-28 NOTE — ED AVS SNAPSHOT
[facility] Emergency Department    [address] [address]    Phone:  [phone]    Fax:  [phone]                                       Pop Garcia   MRN: [id number]    Department:  [facility] Emergency Department   Date of Visit:  1/28/2017           After Visit Summary Signature Page     I have received my discharge instructions, and my questions have been answered. I have discussed any challenges I see with this plan with the nurse or doctor.    ..........................................................................................................................................  Patient/Patient Representative Signature      ..........................................................................................................................................  Patient Representative Print Name and Relationship to Patient    ..................................................               ................................................  Date                                            Time    ..........................................................................................................................................  Reviewed by Signature/Title    ...................................................              ..............................................  Date                                                            Time

## 2017-01-28 NOTE — ED PROVIDER NOTES
History     Chief Complaint   Patient presents with     Pharyngitis     st since yesterday,fever sweats and chills     HPI  Pop Garcia is a 31 year old male who presents to urgent care for evaluation of sore throat, fever, chills, and headache. Symptoms started on Sunday, 6 days ago and initiallyl he had a back ache as well but this has resolved.  Yesterday sore throat started. Denies vomiting. Denies abdominal pain. Watery stools 2-3 times a day. He is tolerating fluids but feels that he is weak and dehydrated. Patient was evaluated for this 2 days ago in the emergency department and had an extensive work-up with Unclear cause. xray, labs were reassuring. He was instructed to return for worsening and recheck for persistent fever within 48 hours. recheck urine for protein.  Stay hydrated with >64 oz/day - avoid caffeine.   tylenol or motrin for fever. Consideration was given to obtain an abdomen/pelvis CT if fever persists.       I have reviewed the Medications, Allergies, Past Medical and Surgical History, and Social History in the Epic system.    Review of Systems   Constitutional: Positive for fever, chills, activity change, appetite change, fatigue and unexpected weight change.   HENT: Positive for sore throat.    Respiratory: Negative.    Cardiovascular: Negative.    Gastrointestinal: Positive for diarrhea. Negative for vomiting, abdominal pain and blood in stool.   Genitourinary: Negative.    Musculoskeletal: Positive for myalgias. Negative for back pain.   Skin: Negative.    Neurological: Positive for weakness.       Physical Exam   BP: 134/77 mmHg  Heart Rate: 92  Temp: 99.7  F (37.6  C)  Resp: 18  Weight: (!) 167.831 kg (370 lb)  SpO2: 98 %  Physical Exam   Constitutional: He is oriented to person, place, and time. He appears well-developed and well-nourished. He appears distressed.   HENT:   Head: Normocephalic and atraumatic.   Right Ear: Tympanic membrane, external ear and ear canal normal.   Left Ear:  Tympanic membrane, external ear and ear canal normal.   Nose: Nose normal.   Mouth/Throat: Uvula is midline, oropharynx is clear and moist and mucous membranes are normal.   Eyes: Conjunctivae and EOM are normal.   Neck: Normal range of motion.   Cardiovascular: Normal rate and normal heart sounds.    Pulmonary/Chest: Effort normal and breath sounds normal.   Neurological: He is alert and oriented to person, place, and time.   Skin: Skin is warm and dry.       ED Course   Procedures         Results for orders placed or performed during the hospital encounter of 01/28/17 (from the past 24 hour(s))   Rapid Strep Screen   Result Value Ref Range    Specimen Description Throat     Rapid Strep A Screen       NEGATIVE: No Group A streptococcal antigen detected by immunoassay, await   culture report.      Micro Report Status FINAL 01/28/2017    Mono   Result Value Ref Range    Mononucleosis Screen Negative NEG         Assessments & Plan (with Medical Decision Making)     Pop Garcia is a 31 year old male who presents to urgent care for evaluation of sore throat, fever, chills, and headache. Symptoms started on Sunday, 6 days ago and initiallyl he had a back ache as well but this has resolved.  Yesterday sore throat started. Denies vomiting. Denies abdominal pain. Watery stools 2-3 times a day. He is tolerating fluids but feels that he is weak and dehydrated. Patient was evaluated for this 2 days ago in the emergency department and had an extensive work-up with Unclear cause. xray, labs were reassuring. He was instructed to return for worsening and recheck for persistent fever within 48 hours. recheck urine for protein.  Stay hydrated with >64 oz/day - avoid caffeine.   tylenol or motrin for fever. Consideration was given to obtain an abdomen/pelvis CT if fever persists. Vital signs stable. Low grade temp. On exam he lung sound CTA.  Posterior oropharynx erythema without exudate. No sign of peritonsillar abscess.  Discussed  with patient my recommendation that we repeat some blood test, and receive IV fluids.  This would require him to move to the emergency department.  Patient refuses any further blood tests or IV.  He states that he wants to be checked for mono and strep.  We agreed to defer any other testing at this time.  RST and mono are negative. He would like something for his throat pain.  Prescription for decadron 10mg one dose sent to pharmacy. Patient instructed to return for persistent fever, vomiting, increased diarrhea, abdominal pain, or new/worse symptoms.     I have reviewed the nursing notes.    I have reviewed the findings, diagnosis, plan and need for follow up with the patient.      Final diagnoses:   Acute pharyngitis, unspecified etiology       1/28/2017   [facility] EMERGENCY DEPARTMENT      Tabitha Morgan APRN CNP  01/28/17 7947

## 2017-01-28 NOTE — LETTER
[facility] EMERGENCY DEPARTMENT  [address] [address]  [phone]          January 28, 2017    RE:  Pop Garcia                                                                                                                                                       [address] [address]            To whom it may concern:    Pop Garcia was under my professional care in Urgent Care today.  He is unable to work today thru Tuesday.    Sincerely,         KELSY Harrison CNP

## 2017-01-28 NOTE — ED AVS SNAPSHOT
[facility]    [address] [address]    Phone:  [phone]    Fax:  [phone]                                       Pop Garcia   MRN: [id number]    Department:  [facility]   Date of Visit:  1/28/2017           Patient Information     Date Of Birth          1985        Your diagnoses for this visit were:     Acute pharyngitis, unspecified etiology        You were seen by Tabitha Morgan APRN CNP.      Follow-up Information     Follow up with [facility].    Specialty:  EMERGENCY MEDICINE    Why:  If symptoms worsen    Contact information:    [address] [id number] [phone]    Additional information:    The medical center is located at   [address]. (between [address] in [place], [place] miles north   of [place]).        Discharge Instructions         Viral Pharyngitis (Sore Throat)    You (or your child, if your child is the patient) have pharyngitis (sore throat). This infection is caused by a virus. It can cause throat pain that is worse when swallowing, aching all over, headache, and fever. The infection may be spread by coughing, kissing, or touching others after touching your mouth or nose. Antibiotic medications do not work against viruses, so they are not used for treating this condition.  Home care    If your symptoms are severe, rest at home. Return to work or school when you feel well enough.     Drink plenty of fluids to avoid dehydration.    For children: Use acetaminophen for fever, fussiness or discomfort. In infants over six months of age, you may use ibuprofen instead of acetaminophen. (NOTE: If your child has chronic liver or kidney disease or ever had a stomach ulcer or GI bleeding, talk with your doctor before using these medicines.) (NOTE: Aspirin should never be used in anyone under 18 years of age who is ill with a fever. It may  cause severe liver damage.)     For adults: You may use acetaminophen or ibuprofen to control pain or fever, unless another medicine was prescribed for this. (NOTE: If you have chronic liver or kidney disease or ever had a stomach ulcer or GI bleeding, talk with your doctor before using these medicines.)    Throat lozenges or numbing throat sprays can help reduce pain. Gargling with warm salt water will also help reduce throat pain. For this, dissolve 1/2 teaspoon of salt in 1 glass of warm water. To help soothe a sore throat, children can sip on juice or a popsicle. Children 5 years and older can also suck on a lollipop or hard candy.    Avoid salty or spicy foods, which can be irritating to the throat.  Follow-up care  Follow up with your healthcare provider or our staff if you are not improving over the next week.  When to seek medical advice  Call your healthcare provider right away if any of these occur:    Fever as directed by your doctor.  For children, seek care if:    Your child is of any age and has repeated fevers above 104 F (40 C).    Your child is younger than 2 years of age and has a fever of 100.4 F (38 C) that continues for more than 1 day.    Your child is 2 years old or older and has a fever of 100.4 F (38 C) that continues for more than 3 days.    New or worsening ear pain, sinus pain, or headache    Painful lumps in the back of neck    Stiff neck    Lymph nodes are getting larger    Inability to swallow liquids, excessive drooling, or inability to open mouth wide due to throat pain    Signs of dehydration (very dark urine or no urine, sunken eyes, dizziness)    Trouble breathing or noisy breathing    Muffled voice    New rash    Child appears to be getting sicker    5804-4553 [employer]. [address] [place] [address], [place] [address]. All rights reserved. This information is not intended as a substitute for professional medical care. Always follow your healthcare professional's  instructions.          24 Hour Appointment Hotline       To make an appointment at any [facility], call [phone] ([phone]). If you don't have a family doctor or clinic, we will help you find one. [facility] clinics are conveniently located to serve the needs of you and your family.             Review of your medicines      START taking        Dose / Directions Last dose taken    dexamethasone 4 MG tablet   Commonly known as:  DECADRON   Dose:  10 mg   Quantity:  2.5 tablet        Take 2.5 tablets (10 mg) by mouth 2 times daily (with meals)   Refills:  0          Our records show that you are taking the medicines listed below. If these are incorrect, please call your family doctor or clinic.        Dose / Directions Last dose taken    HYDROcodone-acetaminophen 5-325 MG per tablet   Commonly known as:  NORCO   Dose:  1-2 tablet   Quantity:  4 tablet        Take 1-2 tablets by mouth every 4 hours as needed for moderate to severe pain   Refills:  0        naproxen 500 MG tablet   Commonly known as:  NAPROSYN   Dose:  500 mg   Quantity:  16 tablet        Take 1 tablet (500 mg) by mouth 2 times daily (with meals) for 8 days   Refills:  0                Prescriptions were sent or printed at these locations (1 Prescription)                   [facility] [place], [place] - [address]   [address] [address]    Telephone:  [phone]   Fax:  [phone]   Hours:                  E-Prescribed (1 of 1)         dexamethasone (DECADRON) 4 MG tablet                Procedures and tests performed during your visit     Beta strep group A culture    Mono    Rapid Strep Screen      Orders Needing Specimen Collection     None      Pending Results     Date and Time Order Name Status Description    1/28/2017 1125 Beta strep group A culture In process             Pending Culture Results     Date and Time Order Name Status Description    1/28/2017 1125 Beta strep group A culture In  "process        Test Results from your hospital stay           2017 12:13 PM - Interface, Flexilab Results      Component Results     Component    Specimen Description    Throat    Rapid Strep A Screen    NEGATIVE: No Group A streptococcal antigen detected by immunoassay, await   culture report.      Micro Report Status    FINAL 2017  1:52 PM - Interface, Flexilab Results      Component Results     Component Value Ref Range & Units Status    Mononucleosis Screen Negative NEG Final         2017  1:53 PM - Interface, Flexilab Results                Thank you for choosing [facility]       Thank you for choosing [facility] for your care. Our goal is always to provide you with excellent care. Hearing back from our patients is one way we can continue to improve our services. Please take a few minutes to complete the written survey that you may receive in the mail after you visit with us. Thank you!        [employer] Information     [employer]Desktop lets you send messages to your doctor, view your test results, renew your prescriptions, schedule appointments and more. To sign up, go to [email or web address] . Click on \"Log in\" on the left side of the screen, which will take you to the Welcome page. Then click on \"Sign up Now\" on the right side of the page.     You will be asked to enter the access code listed below, as well as some personal information. Please follow the directions to create your username and password.     Your access code is: [email or web address]  Expires: 2017  6:27 PM     Your access code will  in 90 days. If you need help or a new code, please call your [facility] clinic or [phone].        Care EveryWhere ID     This is your Care EveryWhere ID. This could be used by other organizations to access your [facility] medical records  [id number]        After Visit Summary       This is your record. Keep this with you and show to your community pharmacist(s) and doctor(s) at your next " visit.

## 2017-01-30 LAB
BACTERIA SPEC CULT: NORMAL
MICRO REPORT STATUS: NORMAL
SPECIMEN SOURCE: NORMAL

## 2017-01-31 ENCOUNTER — OFFICE VISIT (OUTPATIENT)
Dept: FAMILY MEDICINE | Facility: CLINIC | Age: 32
End: 2017-01-31
Payer: COMMERCIAL

## 2017-01-31 VITALS — SYSTOLIC BLOOD PRESSURE: 129 MMHG | DIASTOLIC BLOOD PRESSURE: 66 MMHG | HEART RATE: 101 BPM | TEMPERATURE: 97.6 F

## 2017-01-31 DIAGNOSIS — H66.001 ACUTE SUPPURATIVE OTITIS MEDIA OF RIGHT EAR WITHOUT SPONTANEOUS RUPTURE OF TYMPANIC MEMBRANE, RECURRENCE NOT SPECIFIED: Primary | ICD-10-CM

## 2017-01-31 DIAGNOSIS — J02.9 ACUTE PHARYNGITIS, UNSPECIFIED ETIOLOGY: ICD-10-CM

## 2017-01-31 LAB — HETEROPH AB SER QL: NEGATIVE

## 2017-01-31 PROCEDURE — 86308 HETEROPHILE ANTIBODY SCREEN: CPT | Performed by: FAMILY MEDICINE

## 2017-01-31 PROCEDURE — 36415 COLL VENOUS BLD VENIPUNCTURE: CPT | Performed by: FAMILY MEDICINE

## 2017-01-31 PROCEDURE — 99214 OFFICE O/P EST MOD 30 MIN: CPT | Performed by: FAMILY MEDICINE

## 2017-01-31 RX ORDER — PENICILLIN V POTASSIUM 500 MG/1
500 TABLET, FILM COATED ORAL 3 TIMES DAILY
Qty: 30 TABLET | Refills: 0 | Status: SHIPPED | OUTPATIENT
Start: 2017-01-31 | End: 2017-02-10

## 2017-01-31 NOTE — PATIENT INSTRUCTIONS
Thank you for choosing [facility].  You may be receiving a survey in the mail from Lawson Balbuena regarding your visit today.  Please take a few minutes to complete and return the survey to let us know how we are doing.      If you have questions or concerns, please contact us via Tauntr or you can contact your care team at [phone].    Our Clinic hours are:  Monday 6:40 am  to 7:00 pm  Tuesday -Friday 6:40 am to 5:00 pm    The [place] outpatient lab hours are:  Monday - Friday 6:10 am to 4:45 pm  Saturdays 7:00 am to 11:00 am  Appointments are required, call [phone]    If you have clinical questions after hours or would like to schedule an appointment,  call the clinic at [phone].

## 2017-01-31 NOTE — MR AVS SNAPSHOT
After Visit Summary   1/31/2017    Pop Garcia    MRN: [id number]           Patient Information     Date Of Birth          1985        Visit Information        Provider Department      1/31/2017 11:20 AM Radha Dias MD [facility]        Today's Diagnoses     Acute suppurative otitis media of right ear without spontaneous rupture of tympanic membrane, recurrence not specified    -  1     Acute pharyngitis, unspecified etiology           Care Instructions          Thank you for choosing [facility].  You may be receiving a survey in the mail from Lawson Balbuena regarding your visit today.  Please take a few minutes to complete and return the survey to let us know how we are doing.      If you have questions or concerns, please contact us via Devunity or you can contact your care team at [phone].    Our Clinic hours are:  Monday 6:40 am  to 7:00 pm  Tuesday -Friday 6:40 am to 5:00 pm    The [place] outpatient lab hours are:  Monday - Friday 6:10 am to 4:45 pm  Saturdays 7:00 am to 11:00 am  Appointments are required, call [phone]    If you have clinical questions after hours or would like to schedule an appointment,  call the clinic at [phone].          Follow-ups after your visit        Who to contact     If you have questions or need follow up information about today's clinic visit or your schedule please contact [facility] directly at [phone].  Normal or non-critical lab and imaging results will be communicated to you by [employer], letter or phone within 4 business days after the clinic has received the results. If you do not hear from us within 7 days, please contact the clinic through Devunity or phone. If you have a critical or abnormal lab result, we will notify you by phone as soon as possible.  Submit refill requests through Devunity or call your pharmacy and they will forward the refill request to us. Please allow 3 business days for  "your refill to be completed.          Additional Information About Your Visit        [employer] Information     [employer] lets you send messages to your doctor, view your test results, renew your prescriptions, schedule appointments and more. To sign up, go to [email or web address] . Click on \"Log in\" on the left side of the screen, which will take you to the Welcome page. Then click on \"Sign up Now\" on the right side of the page.     You will be asked to enter the access code listed below, as well as some personal information. Please follow the directions to create your username and password.     Your access code is: [email or web address]  Expires: 2017  6:27 PM     Your access code will  in 90 days. If you need help or a new code, please call your [facility] clinic or [phone].        Care EveryWhere ID     This is your Care EveryWhere ID. This could be used by other organizations to access your [facility] medical records  [id number]        Your Vitals Were     Pulse Temperature                101 97.6  F (36.4  C) (Tympanic)           Blood Pressure from Last 3 Encounters:   17 129/66   17 134/77   17 151/93    Weight from Last 3 Encounters:   17 370 lb (167.831 kg)   17 370 lb (167.831 kg)   12/04/15 374 lb (169.645 kg)              We Performed the Following     Mononucleosis screen          Today's Medication Changes          These changes are accurate as of: 17  1:22 PM.  If you have any questions, ask your nurse or doctor.               Start taking these medicines.        Dose/Directions    penicillin V potassium 500 MG tablet   Commonly known as:  VEETID   Used for:  Acute suppurative otitis media of right ear without spontaneous rupture of tympanic membrane, recurrence not specified, Acute pharyngitis, unspecified etiology   Started by:  Radha Dias MD        Dose:  500 mg   Take 1 tablet (500 mg) by mouth 3 times daily for 10 days   Quantity:  30 tablet "   Refills:  0            Where to get your medicines      These medications were sent to [facility] [place] - [place], [place] - [address]  [address] [place], [place] [address]     Phone:  [phone]    - penicillin V potassium 500 MG tablet             Primary Care Provider Office Phone #    [facility] [phone]       [address]  [facility] [address]        Thank you!     Thank you for choosing [facility]  for your care. Our goal is always to provide you with excellent care. Hearing back from our patients is one way we can continue to improve our services. Please take a few minutes to complete the written survey that you may receive in the mail after your visit with us. Thank you!             Your Updated Medication List - Protect others around you: Learn how to safely use, store and throw away your medicines at www.disposemymeds.org.          This list is accurate as of: 1/31/17  1:22 PM.  Always use your most recent med list.                   Brand Name Dispense Instructions for use    dexamethasone 4 MG tablet    DECADRON    2.5 tablet    Take 2.5 tablets (10 mg) by mouth 2 times daily (with meals)       HYDROcodone-acetaminophen 5-325 MG per tablet    NORCO    4 tablet    Take 1-2 tablets by mouth every 4 hours as needed for moderate to severe pain       naproxen 500 MG tablet    NAPROSYN    16 tablet    Take 1 tablet (500 mg) by mouth 2 times daily (with meals) for 8 days       penicillin V potassium 500 MG tablet    VEETID    30 tablet    Take 1 tablet (500 mg) by mouth 3 times daily for 10 days

## 2017-01-31 NOTE — Clinical Note
[facility]  [address] [place] [address]  [phone]    RE:  Pop Garcia  [address] [address]  [phone] (home)   January 31, 2017    TO WHOM IT MAY CONCERN:    Pop Garcia was seen on 1/31/2017.  Please excuse him until better due to illness, expect to be back after 2/3/17.    Cordially,      BARB REYES MD.

## 2017-01-31 NOTE — PROGRESS NOTES
a  SUBJECTIVE:                                                    Pop Garcia is 31 year old male   Chief Complaint   Patient presents with     Hospital F/U     ER follow up from 1/26 and 1/28     ED/UC Followup:    Facility:  [facility]  Date of visit: 1/26/2017 and 1/28/2017  Reason for visit: Fever, sore throat, chills, and headache  Current Status: Same, no improvement, now has a very swollen lymph node on the right side of his neck       Problem list and histories reviewed & adjusted, as indicated.  Additional history: as documented    Patient Active Problem List   Diagnosis     Tobacco use disorder     Morbid obesity (H)     Reflux esophagitis     Left axis deviation     CARDIOVASCULAR SCREENING; LDL GOAL LESS THAN 130     Vitamin D deficiency     Attention deficit hyperactivity disorder (ADHD)     Marijuana use, continuous     Chronic frontal sinusitis     Chronic rhinitis     Past Surgical History   Procedure Laterality Date     Surgical history of -   1991     tonsils & adenoids     Gastroscopy,fl  10/2006     eosphagitis,  nonpatent sphincter     [place] screening  8/15/2012     Procedure: WAVESCAN SCREENING;  BILATERAL WAVESCAN SCREENING ;  Surgeon: Luis Johnson MD;  Location: [facility]     Lasik customvue bilateral  8/15/2012     Procedure: LASIK CUSTOMVUE BILATERAL;  BILATERAL LASIK CUSTOMVUE  WITH INTRALASE ;  Surgeon: Luis Johnson MD;  Location: [facility]       Social History   Substance Use Topics     Smoking status: Current Every Day Smoker -- 0.50 packs/day for 10 years     Types: Cigarettes     Smokeless tobacco: Former User     Types: Chew     Quit date: 04/01/2015      Comment: 1/2 ppd as of 1/5/2011     Alcohol Use: No     Family History   Problem Relation Age of Onset     DIABETES Maternal Grandmother      HEART DISEASE Brother      hole in heart     CEREBROVASCULAR DISEASE Maternal Grandfather      C.A.D. Maternal Grandfather      Cancer - colorectal No family hx of      Prostate Cancer  No family hx of      Anesthesia Reaction No family hx of          Current Outpatient Prescriptions   Medication Sig Dispense Refill     penicillin V potassium (VEETID) 500 MG tablet Take 1 tablet (500 mg) by mouth 3 times daily for 10 days 30 tablet 0     dexamethasone (DECADRON) 4 MG tablet Take 2.5 tablets (10 mg) by mouth 2 times daily (with meals) 2.5 tablet 0     naproxen (NAPROSYN) 500 MG tablet Take 1 tablet (500 mg) by mouth 2 times daily (with meals) for 8 days 16 tablet 0     HYDROcodone-acetaminophen (NORCO) 5-325 MG per tablet Take 1-2 tablets by mouth every 4 hours as needed for moderate to severe pain 4 tablet 0     No Known Allergies  Recent Labs   Lab Test  01/26/17   0925  01/26/17   0920  09/06/14   0944  06/22/12   0809   02/07/11   1437   01/05/11   1459   A1C   --    --    --   5.3   --   5.4   --    --    LDL   --    --   88   --    --    --    --    --    HDL   --    --   26*   --    --    --    --    --    TRIG   --    --   103   --    --    --    --    --    ALT  31   --   27  17   < >   --    --   48   CR   --   0.80   --   0.85   < >   --    < >  0.90   GFRESTIMATED   --   >90  Non  GFR Calc     --   >90   < >   --    < >  >90   GFRESTBLACK   --   >90   GFR Calc     --   >90   < >   --    < >  >90   POTASSIUM   --   4.0   --   5.2   < >   --    < >  5.5*   TSH   --    --    --   1.27   --    --    --   1.75    < > = values in this interval not displayed.      BP Readings from Last 3 Encounters:   01/31/17 129/66   01/28/17 134/77   01/26/17 151/93    Wt Readings from Last 3 Encounters:   01/28/17 370 lb (167.831 kg)   01/26/17 370 lb (167.831 kg)   12/04/15 374 lb (169.645 kg)         ROS:  Constitutional, HEENT, cardiovascular, pulmonary, gi and gu systems are negative, except as otherwise noted.    OBJECTIVE:                                                    /66 mmHg  Pulse 101  Temp(Src) 97.6  F (36.4  C) (Tympanic)  GENERAL APPEARANCE ADULT:  alert, morbidly obese, appears ill, anxious, cooperative, pale, sallow complexion, diaphoretic, tired appearing  HENT: right TM abnormal, dull, red. left TM abnormal, dull, red. throat/mouth:tonsillar hypertrophy +1, mild erythema, mucous membranes moist  RESP: lungs clear to auscultation   CV: normal rate, regular rhythm, no murmur or gallop  SKIN: moist and clammy through out  Diagnostic Test Results:  Results for orders placed or performed in visit on 01/31/17   Mononucleosis screen   Result Value Ref Range    Mononucleosis Screen Negative NEG        ASSESSMENT/PLAN:                                                    1. Acute suppurative otitis media of right ear without spontaneous rupture of tympanic membrane, recurrence not specified  Viral vs bacterial.  Trial of antibiotics but if not effective viral infection and self limiting, is acutely ill, if worsening or no improvement go to ED over weekend.    - penicillin V potassium (VEETID) 500 MG tablet; Take 1 tablet (500 mg) by mouth 3 times daily for 10 days  Dispense: 30 tablet; Refill: 0    2. Acute pharyngitis, unspecified etiology  - penicillin V potassium (VEETID) 500 MG tablet; Take 1 tablet (500 mg) by mouth 3 times daily for 10 days  Dispense: 30 tablet; Refill: 0  - Mononucleosis screen    Radha Dias MD  [facility]

## 2017-01-31 NOTE — NURSING NOTE
"Chief Complaint   Patient presents with     Hospital F/U     ER follow up from 1/26 and 1/28       Initial /66 mmHg  Pulse 101  Temp(Src) 97.6  F (36.4  C) (Tympanic) Estimated body mass index is 45.62 kg/(m^2) as calculated from the following:    Height as of 10/6/15: 6' 3.5\" (1.918 m).    Weight as of 1/28/17: 370 lb (167.831 kg).  BP completed using cuff size: large  "

## 2017-02-01 LAB
BACTERIA SPEC CULT: NORMAL
BACTERIA SPEC CULT: NORMAL
Lab: NORMAL
Lab: NORMAL
MICRO REPORT STATUS: NORMAL
MICRO REPORT STATUS: NORMAL
SPECIMEN SOURCE: NORMAL
SPECIMEN SOURCE: NORMAL

## 2017-02-03 ENCOUNTER — TELEPHONE (OUTPATIENT)
Dept: FAMILY MEDICINE | Facility: CLINIC | Age: 32
End: 2017-02-03

## 2017-02-03 NOTE — TELEPHONE ENCOUNTER
Reason for Call:  Other prescription    Detailed comments: Pt calling stating Veetid is not working and would like a different antibiotic, prescribed at appt 1/31     Phone Number Patient can be reached at: Home number on file [phone] (home)    Best Time: any    Can we leave a detailed message on this number? YES    Call taken on 2/3/2017 at 9:24 AM by Danielle Venegas

## 2017-02-03 NOTE — TELEPHONE ENCOUNTER
Reviewed OV notes with patient.  Patient agrees with plan and verbalized understanding.    He will continue to push fluids and rest.  Will go to UC/ED over weekend with worsening symptoms.      1-31-17 OV notes:  Acute suppurative otitis media of right ear without spontaneous rupture of tympanic membrane, recurrence not specified  Viral vs bacterial.  Trial of antibiotics but if not effective viral infection and self limiting, is acutely ill, if worsening or no improvement go to ED over weekend.  penicillin V potassium (VEETID) 500 MG tablet; Take 1 tablet (500 mg) by mouth 3 times daily for 10 days  Dispense: 30 tablet; Refill: 0    Latrice GAFFNEY RN

## 2017-02-06 ENCOUNTER — TELEPHONE (OUTPATIENT)
Dept: FAMILY MEDICINE | Facility: CLINIC | Age: 32
End: 2017-02-06

## 2017-02-15 ENCOUNTER — TELEPHONE (OUTPATIENT)
Dept: FAMILY MEDICINE | Facility: CLINIC | Age: 32
End: 2017-02-15

## 2017-02-15 NOTE — TELEPHONE ENCOUNTER
S-(situation): ear pain    B-(background): been treated 1/31/16 with pcn v for 10 days     A-(assessment): bilateral ear pain x 4 weeks, runny nose, no cough, no fever. No headache. Swollen lymph node in right side of neck.     R-(recommendations): advised needs clinic visit- scheduled visit for tomorrow    Heena Nash RN

## 2017-02-15 NOTE — TELEPHONE ENCOUNTER
Reason for Call:  Medication or medication refill:    Do you use a [facility]?  Name of the pharmacy and phone number for the current request:  [facility] [phone]    Name of the medication requested: Antibiotic    Other request: He seen Dr. Dias with an ear infection. She put him on an antibiotic.  New he has double ear infection. He is asking for another antibiotic.  Please advise    Can we leave a detailed message on this number? YES    Phone number patient can be reached at: Home number on file [phone] (home)    Best Time: any    Call taken on 2/15/2017 at 2:23 PM by Mandi Giles

## 2018-01-23 ENCOUNTER — OFFICE VISIT (OUTPATIENT)
Dept: FAMILY MEDICINE | Facility: CLINIC | Age: 33
End: 2018-01-23
Payer: COMMERCIAL

## 2018-01-23 VITALS
OXYGEN SATURATION: 98 % | TEMPERATURE: 98.6 F | SYSTOLIC BLOOD PRESSURE: 138 MMHG | BODY MASS INDEX: 39.17 KG/M2 | HEIGHT: 75 IN | DIASTOLIC BLOOD PRESSURE: 78 MMHG | WEIGHT: 315 LBS | HEART RATE: 92 BPM | RESPIRATION RATE: 16 BRPM

## 2018-01-23 DIAGNOSIS — Z23 NEED FOR VACCINATION: ICD-10-CM

## 2018-01-23 DIAGNOSIS — J01.90 ACUTE SINUSITIS WITH SYMPTOMS > 10 DAYS: Primary | ICD-10-CM

## 2018-01-23 DIAGNOSIS — L01.00 IMPETIGO: ICD-10-CM

## 2018-01-23 PROCEDURE — 90471 IMMUNIZATION ADMIN: CPT | Performed by: NURSE PRACTITIONER

## 2018-01-23 PROCEDURE — 99214 OFFICE O/P EST MOD 30 MIN: CPT | Mod: 25 | Performed by: NURSE PRACTITIONER

## 2018-01-23 PROCEDURE — 90715 TDAP VACCINE 7 YRS/> IM: CPT | Performed by: NURSE PRACTITIONER

## 2018-01-23 RX ORDER — MUPIROCIN 20 MG/G
OINTMENT TOPICAL 3 TIMES DAILY
Qty: 22 G | Refills: 0 | Status: SHIPPED | OUTPATIENT
Start: 2018-01-23 | End: 2018-01-28

## 2018-01-23 NOTE — PATIENT INSTRUCTIONS
Sinusitis (Antibiotic Treatment)    The sinuses are air-filled spaces within the bones of the face. They connect to the inside of the nose. Sinusitis is an inflammation of the tissue lining the sinus cavity. Sinus inflammation can occur during a cold. It can also be due to allergies to pollens and other particles in the air. Sinusitis can cause symptoms of sinus congestion and fullness. A sinus infection causes fever, headache and facial pain. There is often green or yellow drainage from the nose or into the back of the throat (post-nasal drip). You have been given antibiotics to treat this condition.  Home care:    Take the full course of antibiotics as instructed. Do not stop taking them, even if you feel better.    Drink plenty of water, hot tea, and other liquids. This may help thin mucus. It also may promote sinus drainage.    Heat may help soothe painful areas of the face. Use a towel soaked in hot water. Or,  the shower and direct the hot spray onto your face. Using a vaporizer along with a menthol rub at night may also help.     An expectorant containing guaifenesin may help thin the mucus and promote drainage from the sinuses.    Over-the-counter decongestants may be used unless a similar medicine was prescribed. Nasal sprays work the fastest. Use one that contains phenylephrine or oxymetazoline. First blow the nose gently. Then use the spray. Do not use these medicines more often than directed on the label or symptoms may get worse. You may also use tablets containing pseudoephedrine. Avoid products that combine ingredients, because side effects may be increased. Read labels. You can also ask the pharmacist for help. (NOTE: Persons with high blood pressure should not use decongestants. They can raise blood pressure.)    Over-the-counter antihistamines may help if allergies contributed to your sinusitis.      Do not use nasal rinses or irrigation during an acute sinus infection, unless told to by  your health care provider. Rinsing may spread the infection to other sinuses.    Use acetaminophen or ibuprofen to control pain, unless another pain medicine was prescribed. (If you have chronic liver or kidney disease or ever had a stomach ulcer, talk with your doctor before using these medicines. Aspirin should never be used in anyone under 18 years of age who is ill with a fever. It may cause severe liver damage.)    Don't smoke. This can worsen symptoms.  Follow-up care  Follow up with your healthcare provider or our staff if you are not improving within the next week.  When to seek medical advice  Call your healthcare provider if any of these occur:    Facial pain or headache becoming more severe    Stiff neck    Unusual drowsiness or confusion    Swelling of the forehead or eyelids    Vision problems, including blurred or double vision    Fever of 100.4 F (38 C) or higher, or as directed by your healthcare provider    Seizure    Breathing problems    Symptoms not resolving within 10 days  Date Last Reviewed: 4/13/2015 2000-2017 [employer]. [address] [place] [address]. All rights reserved. This information is not intended as a substitute for professional medical care. Always follow your healthcare professional's instructions.        Understanding Impetigo  Impetigo is a common bacterial infection of the skin. It most often affects the face, arms, and legs. But it can appear on any part of the body. Anyone can have it, regardless of age. But it is most common in children. Impetigo is very contagious. This means it spreads easily to other people.  How to say it  [id number]   What causes impetigo?  Many types of bacteria live on normal, healthy skin. The bacteria usually don t cause problems. Impetigo happens when bacteria enter the skin through a scratch, break, sore, bite, or irritated spot. They then begin to grow out of control, leading to infection. There are two types of  staphylococcus bacteria that cause impetigo. In certain cases, impetigo appears on skin that has no visible break. It may be more likely to occur on skin that has another skin problem, such as eczema. It may also be more common after a cold or other virus.  Symptoms of impetigo  Symptoms of this problem include:    Small, fluid-filled blisters on the skin that may itch, ooze, or crust    A yellow, honey-colored crust on the infected skin    Skin sores that spread with scratching    An itchy rash that spreads with scratching    Swollen lymph nodes  Treatment for impetigo  The goal is to treat the infection and prevent it from spreading to others.    You will likely be given an antibiotic to treat the infection. This may be a cream or ointment called muporicin to put on your skin. If the infection is severe or spreading, you may be given antibiotic medicine to take by mouth. Be sure to use this medicine as directed. Do not stop using it until you are told to stop, even if your skin gets better. If you stop too soon, the infection may come back and be harder to treat.    Avoid scratching or picking at your sores. It may help to cover affected areas with a bandage.    To prevent spreading the infection, wash your hands often. Avoid sharing personal items, towels, clothes, pillows, and sheets with others. After each use, wash these items in hot water.    Clean the affected skin several times a day. Don t scrub. Instead, soak the area in warm, soapy water. This will help remove the crust that forms. For places that you can't soak, such as the face, place a clean, warm (not hot) washcloth on the affected area. Use a new washcloth and towel each time.  When to call your healthcare provider  Call your healthcare provider right away if you have any of these:    Fever of 100.4 F (38 C) or higher, or as directed    Increasing number of sores or spreading areas of redness after 2 days of treatment with antibiotics    Increasing  swelling or pain    Increased amounts of fluid or pus coming from the sores    Unusual drowsiness, weakness, or change in behavior    Loss of appetite or vomiting   Date Last Reviewed: 5/1/2016 2000-2017 [employer]. [address] [place] [address], [place] [address]. All rights reserved. This information is not intended as a substitute for professional medical care. Always follow your healthcare professional's instructions.

## 2018-01-23 NOTE — NURSING NOTE
"Chief Complaint   Patient presents with     Sinus Problem     Derm Problem       Initial /83 (BP Location: Right arm, Patient Position: Sitting, Cuff Size: Adult Large)  Pulse 92  Temp 98.6  F (37  C) (Tympanic)  Resp 16  Ht 6' 3.25\" (1.911 m)  Wt (!) 381 lb 9.6 oz (173.1 kg)  SpO2 98%  BMI 47.38 kg/m2 Estimated body mass index is 47.38 kg/(m^2) as calculated from the following:    Height as of this encounter: 6' 3.25\" (1.911 m).    Weight as of this encounter: 381 lb 9.6 oz (173.1 kg).  Medication Reconciliation: complete  "

## 2018-01-23 NOTE — PROGRESS NOTES
SUBJECTIVE:   Pop Garcia is a 32 year old male who presents to clinic today for the following health issues:      ENT Symptoms             Symptoms: cc Present Absent Comment   Fever/Chills   x    Fatigue  x     Muscle Aches   x    Eye Irritation  x     Sneezing  x     Nasal Bj/Drg  x  Green and yellow drainage, post nasal   Sinus Pressure/Pain  x     Loss of smell  x     Dental pain  x     Sore Throat   x    Swollen Glands  x  Right side   Ear Pain/Fullness  x  intermittently   Cough  x  productive   Wheeze   x    Chest Pain   x    Shortness of breath   x    Rash   x    Other         Symptom duration:  1 month   Symptom severity:  severe   Treatments tried:  ibuprofen, OTC sinus medication   Contacts:  none     Rash  Onset: yesterday    Description:   Location: nose  Character: draining, red, tenderness around scabbed area  Itching (Pruritis): no     Progression of Symptoms:  worsening    Accompanying Signs & Symptoms:  Fever: no   Body aches or joint pain: no   Sore throat symptoms: no   Recent cold symptoms: YES- listed above    History:   Previous similar rash: no     Precipitating factors:   Exposure to similar rash: no   New exposures:  Pets-new dog over the weekend  Recent travel: YES- [place] over [place]    Alleviating factors:  none    Therapies Tried and outcome: none       Problem list and histories reviewed & adjusted, as indicated.  Additional history: as documented    Patient Active Problem List   Diagnosis     Tobacco use disorder     Morbid obesity (H)     Reflux esophagitis     Left axis deviation     CARDIOVASCULAR SCREENING; LDL GOAL LESS THAN 130     Vitamin D deficiency     Attention deficit hyperactivity disorder (ADHD)     Marijuana use, continuous     Chronic frontal sinusitis     Chronic rhinitis     Past Surgical History:   Procedure Laterality Date     GASTROSCOPY,FL  10/2006    eosphagitis,  nonpatent sphincter     LASIK CUSTOMVUE BILATERAL  8/15/2012    Procedure: LASIK CUSTOMVUE  "BILATERAL;  BILATERAL LASIK CUSTOMVUE  WITH INTRALASE ;  Surgeon: Luis Johnson MD;  Location: [facility]     SURGICAL HISTORY OF -   1991    tonsils & adenoids     WAVESCAN SCREENING  8/15/2012    Procedure: WAVESCAN SCREENING;  BILATERAL WAVESCAN SCREENING ;  Surgeon: Luis Johnson MD;  Location: [facility]       Social History   Substance Use Topics     Smoking status: Current Every Day Smoker     Packs/day: 0.50     Years: 10.00     Types: Cigarettes     Smokeless tobacco: Former User     Types: Chew     Quit date: 4/1/2015      Comment: 1/2 ppd as of 1/5/2011     Alcohol use No     Family History   Problem Relation Age of Onset     DIABETES Maternal Grandmother      HEART DISEASE Brother      hole in heart     CEREBROVASCULAR DISEASE Maternal Grandfather      C.A.D. Maternal Grandfather      Cancer - colorectal No family hx of      Prostate Cancer No family hx of      Anesthesia Reaction No family hx of              Reviewed and updated as needed this visit by clinical staff     Reviewed and updated as needed this visit by Provider         ROS:  Constitutional, HEENT, cardiovascular, pulmonary, gi and gu systems are negative, except as otherwise noted.      OBJECTIVE:   /78  Pulse 92  Temp 98.6  F (37  C) (Tympanic)  Resp 16  Ht 6' 3.25\" (1.911 m)  Wt (!) 381 lb 9.6 oz (173.1 kg)  SpO2 98%  BMI 47.38 kg/m2  Body mass index is 47.38 kg/(m^2).  GENERAL: healthy, alert and no distress  EYES: Eyes grossly normal to inspection, PERRL and conjunctivae and sclerae normal  HENT: normal cephalic/atraumatic, both ears: clear effusion, nose and mouth without ulcers or lesions, nasal mucosa edematous , rhinorrhea purulent, oropharynx clear, oral mucous membranes moist and sinuses: maxillary, frontal tenderness on both sides R > L.  NECK: bilateral anterior cervical adenopathy R > L.  RESP: lungs clear to auscultation - no rales, rhonchi or wheezes  CV: regular rates and rhythm, normal S1 S2, no S3 or S4, no " "murmur, click or rub and no peripheral edema  MS: no gross musculoskeletal defects noted, no edema  SKIN: maculopapular eruption - right side of nose with honey colored crusting.  NEURO: Normal strength and tone, mentation intact and speech normal    Diagnostic Test Results:  none     ASSESSMENT/PLAN:     BP Screening:   Last 3 BP Readings:    BP Readings from Last 3 Encounters:   01/23/18 138/78   01/31/17 129/66   01/28/17 134/77       The following was recommended to the patient:  Re-screen BP within a year and recommended lifestyle modifications  Tobacco Cessation:   reports that he has been smoking Cigarettes.  He has a 5.00 pack-year smoking history. He quit smokeless tobacco use about 2 years ago. His smokeless tobacco use included Chew.  Tobacco Cessation Action Plan: Information offered: Patient not interested at this time    BMI:   Estimated body mass index is 47.38 kg/(m^2) as calculated from the following:    Height as of this encounter: 6' 3.25\" (1.911 m).    Weight as of this encounter: 381 lb 9.6 oz (173.1 kg).   Weight management plan: Patient was referred to their PCP to discuss a diet and exercise plan.            ICD-10-CM    1. Acute sinusitis with symptoms > 10 days J01.90 amoxicillin-clavulanate (AUGMENTIN) 875-125 MG per tablet   2. Impetigo L01.00 mupirocin (BACTROBAN) 2 % ointment   3. Need for vaccination Z23 VACCINE ADMINISTRATION, INITIAL     TDAP VACCINE (ADACEL)       FUTURE APPOINTMENTS:       - Follow-up visit in 1-2 weeks for persistent symptoms, sooner PRN new or worsening symptoms.     Patient Instructions       Sinusitis (Antibiotic Treatment)    The sinuses are air-filled spaces within the bones of the face. They connect to the inside of the nose. Sinusitis is an inflammation of the tissue lining the sinus cavity. Sinus inflammation can occur during a cold. It can also be due to allergies to pollens and other particles in the air. Sinusitis can cause symptoms of sinus congestion " and fullness. A sinus infection causes fever, headache and facial pain. There is often green or yellow drainage from the nose or into the back of the throat (post-nasal drip). You have been given antibiotics to treat this condition.  Home care:    Take the full course of antibiotics as instructed. Do not stop taking them, even if you feel better.    Drink plenty of water, hot tea, and other liquids. This may help thin mucus. It also may promote sinus drainage.    Heat may help soothe painful areas of the face. Use a towel soaked in hot water. Or,  the shower and direct the hot spray onto your face. Using a vaporizer along with a menthol rub at night may also help.     An expectorant containing guaifenesin may help thin the mucus and promote drainage from the sinuses.    Over-the-counter decongestants may be used unless a similar medicine was prescribed. Nasal sprays work the fastest. Use one that contains phenylephrine or oxymetazoline. First blow the nose gently. Then use the spray. Do not use these medicines more often than directed on the label or symptoms may get worse. You may also use tablets containing pseudoephedrine. Avoid products that combine ingredients, because side effects may be increased. Read labels. You can also ask the pharmacist for help. (NOTE: Persons with high blood pressure should not use decongestants. They can raise blood pressure.)    Over-the-counter antihistamines may help if allergies contributed to your sinusitis.      Do not use nasal rinses or irrigation during an acute sinus infection, unless told to by your health care provider. Rinsing may spread the infection to other sinuses.    Use acetaminophen or ibuprofen to control pain, unless another pain medicine was prescribed. (If you have chronic liver or kidney disease or ever had a stomach ulcer, talk with your doctor before using these medicines. Aspirin should never be used in anyone under 18 years of age who is ill with a  fever. It may cause severe liver damage.)    Don't smoke. This can worsen symptoms.  Follow-up care  Follow up with your healthcare provider or our staff if you are not improving within the next week.  When to seek medical advice  Call your healthcare provider if any of these occur:    Facial pain or headache becoming more severe    Stiff neck    Unusual drowsiness or confusion    Swelling of the forehead or eyelids    Vision problems, including blurred or double vision    Fever of 100.4 F (38 C) or higher, or as directed by your healthcare provider    Seizure    Breathing problems    Symptoms not resolving within 10 days  Date Last Reviewed: 4/13/2015 2000-2017 [employer]. [address] [place] [address]. All rights reserved. This information is not intended as a substitute for professional medical care. Always follow your healthcare professional's instructions.        Understanding Impetigo  Impetigo is a common bacterial infection of the skin. It most often affects the face, arms, and legs. But it can appear on any part of the body. Anyone can have it, regardless of age. But it is most common in children. Impetigo is very contagious. This means it spreads easily to other people.  How to say it  [id number]   What causes impetigo?  Many types of bacteria live on normal, healthy skin. The bacteria usually don t cause problems. Impetigo happens when bacteria enter the skin through a scratch, break, sore, bite, or irritated spot. They then begin to grow out of control, leading to infection. There are two types of staphylococcus bacteria that cause impetigo. In certain cases, impetigo appears on skin that has no visible break. It may be more likely to occur on skin that has another skin problem, such as eczema. It may also be more common after a cold or other virus.  Symptoms of impetigo  Symptoms of this problem include:    Small, fluid-filled blisters on the skin that may itch, ooze, or  crust    A yellow, honey-colored crust on the infected skin    Skin sores that spread with scratching    An itchy rash that spreads with scratching    Swollen lymph nodes  Treatment for impetigo  The goal is to treat the infection and prevent it from spreading to others.    You will likely be given an antibiotic to treat the infection. This may be a cream or ointment called muporicin to put on your skin. If the infection is severe or spreading, you may be given antibiotic medicine to take by mouth. Be sure to use this medicine as directed. Do not stop using it until you are told to stop, even if your skin gets better. If you stop too soon, the infection may come back and be harder to treat.    Avoid scratching or picking at your sores. It may help to cover affected areas with a bandage.    To prevent spreading the infection, wash your hands often. Avoid sharing personal items, towels, clothes, pillows, and sheets with others. After each use, wash these items in hot water.    Clean the affected skin several times a day. Don t scrub. Instead, soak the area in warm, soapy water. This will help remove the crust that forms. For places that you can't soak, such as the face, place a clean, warm (not hot) washcloth on the affected area. Use a new washcloth and towel each time.  When to call your healthcare provider  Call your healthcare provider right away if you have any of these:    Fever of 100.4 F (38 C) or higher, or as directed    Increasing number of sores or spreading areas of redness after 2 days of treatment with antibiotics    Increasing swelling or pain    Increased amounts of fluid or pus coming from the sores    Unusual drowsiness, weakness, or change in behavior    Loss of appetite or vomiting   Date Last Reviewed: 5/1/2016 2000-2017 [employer]. [address] [place] [address], [place] [address]. All rights reserved. This information is not intended as a substitute for professional medical care.  Always follow your healthcare professional's instructions.            KELSY Hilton [facility]

## 2018-01-23 NOTE — MR AVS SNAPSHOT
After Visit Summary   1/23/2018    Pop Garcia    MRN: [id number]           Patient Information     Date Of Birth          1985        Visit Information        Provider Department      1/23/2018 8:20 AM Bridget Carroll APRN [facility]        Today's Diagnoses     Acute sinusitis with symptoms > 10 days    -  1    Impetigo          Care Instructions      Sinusitis (Antibiotic Treatment)    The sinuses are air-filled spaces within the bones of the face. They connect to the inside of the nose. Sinusitis is an inflammation of the tissue lining the sinus cavity. Sinus inflammation can occur during a cold. It can also be due to allergies to pollens and other particles in the air. Sinusitis can cause symptoms of sinus congestion and fullness. A sinus infection causes fever, headache and facial pain. There is often green or yellow drainage from the nose or into the back of the throat (post-nasal drip). You have been given antibiotics to treat this condition.  Home care:    Take the full course of antibiotics as instructed. Do not stop taking them, even if you feel better.    Drink plenty of water, hot tea, and other liquids. This may help thin mucus. It also may promote sinus drainage.    Heat may help soothe painful areas of the face. Use a towel soaked in hot water. Or,  the shower and direct the hot spray onto your face. Using a vaporizer along with a menthol rub at night may also help.     An expectorant containing guaifenesin may help thin the mucus and promote drainage from the sinuses.    Over-the-counter decongestants may be used unless a similar medicine was prescribed. Nasal sprays work the fastest. Use one that contains phenylephrine or oxymetazoline. First blow the nose gently. Then use the spray. Do not use these medicines more often than directed on the label or symptoms may get worse. You may also use tablets containing pseudoephedrine. Avoid products  that combine ingredients, because side effects may be increased. Read labels. You can also ask the pharmacist for help. (NOTE: Persons with high blood pressure should not use decongestants. They can raise blood pressure.)    Over-the-counter antihistamines may help if allergies contributed to your sinusitis.      Do not use nasal rinses or irrigation during an acute sinus infection, unless told to by your health care provider. Rinsing may spread the infection to other sinuses.    Use acetaminophen or ibuprofen to control pain, unless another pain medicine was prescribed. (If you have chronic liver or kidney disease or ever had a stomach ulcer, talk with your doctor before using these medicines. Aspirin should never be used in anyone under 18 years of age who is ill with a fever. It may cause severe liver damage.)    Don't smoke. This can worsen symptoms.  Follow-up care  Follow up with your healthcare provider or our staff if you are not improving within the next week.  When to seek medical advice  Call your healthcare provider if any of these occur:    Facial pain or headache becoming more severe    Stiff neck    Unusual drowsiness or confusion    Swelling of the forehead or eyelids    Vision problems, including blurred or double vision    Fever of 100.4 F (38 C) or higher, or as directed by your healthcare provider    Seizure    Breathing problems    Symptoms not resolving within 10 days  Date Last Reviewed: 4/13/2015 2000-2017 [employer]. [address] [place] [address], [place] [address]. All rights reserved. This information is not intended as a substitute for professional medical care. Always follow your healthcare professional's instructions.        Understanding Impetigo  Impetigo is a common bacterial infection of the skin. It most often affects the face, arms, and legs. But it can appear on any part of the body. Anyone can have it, regardless of age. But it is most common in children. Impetigo is  very contagious. This means it spreads easily to other people.  How to say it  [id number]   What causes impetigo?  Many types of bacteria live on normal, healthy skin. The bacteria usually don t cause problems. Impetigo happens when bacteria enter the skin through a scratch, break, sore, bite, or irritated spot. They then begin to grow out of control, leading to infection. There are two types of staphylococcus bacteria that cause impetigo. In certain cases, impetigo appears on skin that has no visible break. It may be more likely to occur on skin that has another skin problem, such as eczema. It may also be more common after a cold or other virus.  Symptoms of impetigo  Symptoms of this problem include:    Small, fluid-filled blisters on the skin that may itch, ooze, or crust    A yellow, honey-colored crust on the infected skin    Skin sores that spread with scratching    An itchy rash that spreads with scratching    Swollen lymph nodes  Treatment for impetigo  The goal is to treat the infection and prevent it from spreading to others.    You will likely be given an antibiotic to treat the infection. This may be a cream or ointment called muporicin to put on your skin. If the infection is severe or spreading, you may be given antibiotic medicine to take by mouth. Be sure to use this medicine as directed. Do not stop using it until you are told to stop, even if your skin gets better. If you stop too soon, the infection may come back and be harder to treat.    Avoid scratching or picking at your sores. It may help to cover affected areas with a bandage.    To prevent spreading the infection, wash your hands often. Avoid sharing personal items, towels, clothes, pillows, and sheets with others. After each use, wash these items in hot water.    Clean the affected skin several times a day. Don t scrub. Instead, soak the area in warm, soapy water. This will help remove the crust that forms. For places that you can't  "soak, such as the face, place a clean, warm (not hot) washcloth on the affected area. Use a new washcloth and towel each time.  When to call your healthcare provider  Call your healthcare provider right away if you have any of these:    Fever of 100.4 F (38 C) or higher, or as directed    Increasing number of sores or spreading areas of redness after 2 days of treatment with antibiotics    Increasing swelling or pain    Increased amounts of fluid or pus coming from the sores    Unusual drowsiness, weakness, or change in behavior    Loss of appetite or vomiting   Date Last Reviewed: 5/1/2016 2000-2017 [employer]. [address] [place] [address]. All rights reserved. This information is not intended as a substitute for professional medical care. Always follow your healthcare professional's instructions.                Follow-ups after your visit        Who to contact     If you have questions or need follow up information about today's clinic visit or your schedule please contact [facility] directly at [phone].  Normal or non-critical lab and imaging results will be communicated to you by MyChart, letter or phone within 4 business days after the clinic has received the results. If you do not hear from us within 7 days, please contact the clinic through [employer] or phone. If you have a critical or abnormal lab result, we will notify you by phone as soon as possible.  Submit refill requests through [employer] or call your pharmacy and they will forward the refill request to us. Please allow 3 business days for your refill to be completed.          Additional Information About Your Visit        [employer] Information     [employer] lets you send messages to your doctor, view your test results, renew your prescriptions, schedule appointments and more. To sign up, go to [email or web address] . Click on \"Log in\" on the left side of the screen, which will take you to the Welcome " "page. Then click on \"Sign up Now\" on the right side of the page.     You will be asked to enter the access code listed below, as well as some personal information. Please follow the directions to create your username and password.     Your access code is: [email or web address]  Expires: 2018  8:35 AM     Your access code will  in 90 days. If you need help or a new code, please call your [facility] clinic or [phone].        Care EveryWhere ID     This is your Care EveryWhere ID. This could be used by other organizations to access your [facility] medical records  [id number]        Your Vitals Were     Pulse Temperature Respirations Height Pulse Oximetry BMI (Body Mass Index)    92 98.6  F (37  C) (Tympanic) 16 6' 3.25\" (1.911 m) 98% 47.38 kg/m2       Blood Pressure from Last 3 Encounters:   18 138/78   17 129/66   17 134/77    Weight from Last 3 Encounters:   18 (!) 381 lb 9.6 oz (173.1 kg)   17 (!) 370 lb (167.8 kg)   17 (!) 370 lb (167.8 kg)              Today, you had the following     No orders found for display         Today's Medication Changes          These changes are accurate as of: 18  8:35 AM.  If you have any questions, ask your nurse or doctor.               Start taking these medicines.        Dose/Directions    amoxicillin-clavulanate 875-125 MG per tablet   Commonly known as:  AUGMENTIN   Used for:  Acute sinusitis with symptoms > 10 days   Started by:  Bridget Carroll APRN CNP        Dose:  1 tablet   Take 1 tablet by mouth 2 times daily for 14 days   Quantity:  28 tablet   Refills:  0       mupirocin 2 % ointment   Commonly known as:  BACTROBAN   Used for:  Impetigo   Started by:  Bridget Carroll APRN CNP        Apply topically 3 times daily for 5 days   Quantity:  22 g   Refills:  0            Where to get your medicines      These medications were sent to [facility] [address] [facility] [address]  [address] [address] [facility] [address]     Phone:  [phone]     amoxicillin-clavulanate 875-125 MG per tablet    mupirocin 2 % ointment                Primary Care Provider Office Phone # Fax #    [facility] [phone] [address] [facility] [address]        Equal Access to Services     RAULITO MAGAÑA : Hadii aad ku hadasho Soomaali, waaxda luqadaha, qaybta kaalmada adeegyada, waxay idiin hayaan adeeg khmaggy laShannanwagner . So [facility] [phone].    ATENCIÓN: Si martell amador, tiene a vargas disposición servicios gratuitos de asistencia lingüística. Constantinoame al [phone].    We comply with applicable federal civil rights laws and [place] laws. We do not discriminate on the basis of race, color, national origin, age, disability, sex, sexual orientation, or gender identity.            Thank you!     Thank you for choosing [facility]  for your care. Our goal is always to provide you with excellent care. Hearing back from our patients is one way we can continue to improve our services. Please take a few minutes to complete the written survey that you may receive in the mail after your visit with us. Thank you!             Your Updated Medication List - Protect others around you: Learn how to safely use, store and throw away your medicines at www.disposemymeds.org.          This list is accurate as of: 1/23/18  8:35 AM.  Always use your most recent med list.                   Brand Name Dispense Instructions for use Diagnosis    amoxicillin-clavulanate 875-125 MG per tablet    AUGMENTIN    28 tablet    Take 1 tablet by mouth 2 times daily for 14 days    Acute sinusitis with symptoms > 10 days       dexamethasone 4 MG tablet    DECADRON    2.5 tablet    Take 2.5 tablets (10 mg) by mouth 2 times daily (with meals)        HYDROcodone-acetaminophen 5-325 MG per tablet    NORCO    4 tablet    Take 1-2 tablets by mouth every 4 hours as needed for moderate to severe  pain        mupirocin 2 % ointment    BACTROBAN    22 g    Apply topically 3 times daily for 5 days    Impetigo

## 2018-01-23 NOTE — LETTER
[facility]  [address]  [facility] [address]  Phone: [phone]    01/23/18    Pop Garcia  [address]  [place] [address]      To whom it may concern:     Pop was treated in clinic today. Please excuse him from missed work today. He may return when his symptoms are improved. I expect him to improve over the next 1-3 days.    Sincerely,      KELSY Hilton CNP

## 2018-01-24 ENCOUNTER — OFFICE VISIT (OUTPATIENT)
Dept: FAMILY MEDICINE | Facility: CLINIC | Age: 33
End: 2018-01-24
Payer: COMMERCIAL

## 2018-01-24 ENCOUNTER — TELEPHONE (OUTPATIENT)
Dept: FAMILY MEDICINE | Facility: CLINIC | Age: 33
End: 2018-01-24

## 2018-01-24 VITALS
BODY MASS INDEX: 39.17 KG/M2 | OXYGEN SATURATION: 96 % | SYSTOLIC BLOOD PRESSURE: 140 MMHG | TEMPERATURE: 98.4 F | HEIGHT: 75 IN | DIASTOLIC BLOOD PRESSURE: 79 MMHG | HEART RATE: 98 BPM | WEIGHT: 315 LBS

## 2018-01-24 DIAGNOSIS — L03.213 PERIORBITAL CELLULITIS OF RIGHT EYE: Primary | ICD-10-CM

## 2018-01-24 PROCEDURE — 99213 OFFICE O/P EST LOW 20 MIN: CPT | Mod: 25 | Performed by: FAMILY MEDICINE

## 2018-01-24 PROCEDURE — 96372 THER/PROPH/DIAG INJ SC/IM: CPT | Performed by: FAMILY MEDICINE

## 2018-01-24 RX ORDER — CLINDAMYCIN HCL 300 MG
300 CAPSULE ORAL 4 TIMES DAILY
Qty: 40 CAPSULE | Refills: 0 | Status: SHIPPED | OUTPATIENT
Start: 2018-01-24 | End: 2018-03-14

## 2018-01-24 RX ORDER — CEFTRIAXONE 1 G/1
2000 INJECTION, POWDER, FOR SOLUTION INTRAMUSCULAR; INTRAVENOUS ONCE
Qty: 20 ML | Refills: 0 | OUTPATIENT
Start: 2018-01-24 | End: 2018-01-24

## 2018-01-24 NOTE — TELEPHONE ENCOUNTER
Spoke with pt and provided this information.  He states understanding of instructions and will head to .    Susie BENAVIDEZ RN

## 2018-01-24 NOTE — TELEPHONE ENCOUNTER
Reason for Call:  Other appointment and call back    Detailed comments: He is wondering if he can be worked into a schedule at [place] to see a Dr.   He does not want to go to  and I did offer his an appt at [facility] in [place] but he refused to go up there too.  Please advise.    Phone Number Patient can be reached at: Home number on file [phone] (home)    Best Time: any    Can we leave a detailed message on this number? YES    Call taken on 1/24/2018 at 10:34 AM by Mandi Giles

## 2018-01-24 NOTE — PATIENT INSTRUCTIONS
1. I am concerned about the swelling around eye.  We need to treat this as infection    2. Injection of strong antibiotic will get this moving faster.    3. Stop the amoxicillin and start the new antiobioitc.     4. Come back tomorrow afternoon.

## 2018-01-24 NOTE — MR AVS SNAPSHOT
"              After Visit Summary   1/24/2018    Pop Garcia    MRN: [id number]           Patient Information     Date Of Birth          1985        Visit Information        Provider Department      1/24/2018 2:00 PM Del Alcaraz MD [facility]        Today's Diagnoses     Periorbital cellulitis of right eye    -  1      Care Instructions    1. I am concerned about the swelling around eye.  We need to treat this as infection    2. Injection of strong antibiotic will get this moving faster.    3. Stop the amoxicillin and start the new antiobioitc.     4. Come back tomorrow afternoon.           Follow-ups after your visit        Who to contact     If you have questions or need follow up information about today's clinic visit or your schedule [facility] at [phone].  Normal or non-critical lab and imaging results will be communicated to you by MyChart, letter or phone within 4 business days after the clinic has received the results. If you do not hear from us within 7 days, please contact the clinic through MyChart or phone. If you have a critical or abnormal lab result, we will notify you by phone as soon as possible.  Submit refill requests through [employer] or call your pharmacy and they will forward the refill request to us. Please allow 3 business days for your refill to be completed.          Additional Information About Your Visit        MyChart Information     [employer] lets you send messages to your doctor, view your test results, renew your prescriptions, schedule appointments and more. To sign up, go to [email or web address] . Click on \"Log in\" on the left side of the screen, which will take you to the Welcome page. Then click on \"Sign up Now\" on the right side of the page.     You will be asked to enter the access code listed below, as well as some personal information. Please follow the directions to create your username " "and password.     Your access code is: [email or web address]  Expires: 2018  8:35 AM     Your access code will  in 90 days. If you need help or a new code, please call your [place] clinic or [phone].        Care EveryWhere ID     This is your Care EveryWhere ID. This could be used by other organizations to access your [place] medical records  [id number]        Your Vitals Were     Pulse Temperature Height Pulse Oximetry BMI (Body Mass Index)       98 98.4  F (36.9  C) (Tympanic) 6' 3.25\" (1.911 m) 96% 47.68 kg/m2        Blood Pressure from Last 3 Encounters:   18 140/79   18 138/78   17 129/66    Weight from Last 3 Encounters:   18 (!) 384 lb (174.2 kg)   18 (!) 381 lb 9.6 oz (173.1 kg)   17 (!) 370 lb (167.8 kg)              Today, you had the following     No orders found for display         Today's Medication Changes          These changes are accurate as of 18  2:03 PM.  If you have any questions, ask your nurse or doctor.               Start taking these medicines.        Dose/Directions    clindamycin 300 MG capsule   Commonly known as:  CLEOCIN   Used for:  Periorbital cellulitis of right eye   Started by:  Del Alcaraz MD        Dose:  300 mg   Take 1 capsule (300 mg) by mouth 4 times daily   Quantity:  40 capsule   Refills:  0            Where to get your medicines      These medications were sent to [place] [facility] [address] [address]     Phone:  [phone]     clindamycin 300 MG capsule                Primary Care Provider Office Phone # Fax #    [facility] [phone] [address] [facility] [address]        Equal Access to Services     RAULITO MAGAÑA AH: Aleja Gutierrez, rebeca matthews, lea martin. [facility] [phone].    ATENCIÓN: Si martell amador, " tiene a vargas disposición servicios gratuitos de asistencia lingüística. Mello ackerman [phone].    We comply with applicable federal civil rights laws and [place] laws. We do not discriminate on the basis of race, color, national origin, age, disability, sex, sexual orientation, or gender identity.            Thank you!     Thank you for choosing [facility]  for your care. Our goal is always to provide you with excellent care. Hearing back from our patients is one way we can continue to improve our services. Please take a few minutes to complete the written survey that you may receive in the mail after your visit with us. Thank you!             Your Updated Medication List - Protect others around you: Learn how to safely use, store and throw away your medicines at www.disposemymeds.org.          This list is accurate as of 1/24/18  2:03 PM.  Always use your most recent med list.                   Brand Name Dispense Instructions for use Diagnosis    amoxicillin-clavulanate 875-125 MG per tablet    AUGMENTIN    28 tablet    Take 1 tablet by mouth 2 times daily for 14 days    Acute sinusitis with symptoms > 10 days       clindamycin 300 MG capsule    CLEOCIN    40 capsule    Take 1 capsule (300 mg) by mouth 4 times daily    Periorbital cellulitis of right eye       dexamethasone 4 MG tablet    DECADRON    2.5 tablet    Take 2.5 tablets (10 mg) by mouth 2 times daily (with meals)        HYDROcodone-acetaminophen 5-325 MG per tablet    NORCO    4 tablet    Take 1-2 tablets by mouth every 4 hours as needed for moderate to severe pain        mupirocin 2 % ointment    BACTROBAN    22 g    Apply topically 3 times daily for 5 days    Impetigo

## 2018-01-24 NOTE — TELEPHONE ENCOUNTER
Would recommend he be seen in urgent care to r/o orbital cellulitis. Maybe they can give him a shot of rocephin. I don't think an antihistamine would help much being it is not an allergic reaction. He can put ice on for 10-15 minutes at a time. Thanks. KELSY Mazariegos CNP

## 2018-01-24 NOTE — PROGRESS NOTES
SUBJECTIVE:   Pop Garcia is a 32 year old male who presents to clinic today for the following health issues:      Patient is here today for a follow up of swelling around right eye, nose and cheek and rash on nose. Patient had a telephone encounter today and was advised to be seen.  Patient woke up this morning with the swelling in face and has been seen previously for rash on nose yesterday.  Patient states it is same and hasn't gotten any better or worse.  Patient did just start the antibiotics yesterday for a sinus infection.    Gave patient 5 mL (2 G) of Rocephin today per Dr. Alcaraz and was told to stay in building for 15 minutes in case any reaction occurs.  Paige Martini / Certified Medical Assistant......1/24/2018 2:24 PM      He has had a lesion on his nose for several days and now has swelling around his right eye.  Currently on amoxicillin       Problem list and histories reviewed & adjusted, as indicated.  Additional history: as documented    Patient Active Problem List   Diagnosis     Tobacco use disorder     Morbid obesity (H)     Reflux esophagitis     Left axis deviation     CARDIOVASCULAR SCREENING; LDL GOAL LESS THAN 130     Vitamin D deficiency     Attention deficit hyperactivity disorder (ADHD)     Marijuana use, continuous     Chronic frontal sinusitis     Chronic rhinitis     Past Surgical History:   Procedure Laterality Date     GASTROSCOPY,FL  10/2006    eosphagitis,  nonpatent sphincter     LASIK CUSTOMVUE BILATERAL  8/15/2012    Procedure: LASIK CUSTOMVUE BILATERAL;  BILATERAL LASIK CUSTOMVUE  WITH INTRALASE ;  Surgeon: Luis Johnson MD;  Location: [facility]     SURGICAL HISTORY OF -   1991    tonsils & adenoids     WAVESCAN SCREENING  8/15/2012    Procedure: WAVESCAN SCREENING;  BILATERAL WAVESCAN SCREENING ;  Surgeon: Luis Johnson MD;  Location: [facility]       Social History   Substance Use Topics     Smoking status: Current Every Day Smoker     Packs/day: 0.50     Years:  "10.00     Types: Cigarettes     Smokeless tobacco: Former User     Types: Chew     Quit date: 4/1/2015      Comment: 1/2 ppd as of 1/5/2011     Alcohol use No     Family History   Problem Relation Age of Onset     DIABETES Maternal Grandmother      HEART DISEASE Brother      hole in heart     CEREBROVASCULAR DISEASE Maternal Grandfather      HELDER Maternal Grandfather      Cancer - colorectal No family hx of      Prostate Cancer No family hx of      Anesthesia Reaction No family hx of          Current Outpatient Prescriptions   Medication Sig Dispense Refill     clindamycin (CLEOCIN) 300 MG capsule Take 1 capsule (300 mg) by mouth 4 times daily 40 capsule 0     mupirocin (BACTROBAN) 2 % ointment Apply topically 3 times daily for 5 days 22 g 0     amoxicillin-clavulanate (AUGMENTIN) 875-125 MG per tablet Take 1 tablet by mouth 2 times daily for 14 days 28 tablet 0     dexamethasone (DECADRON) 4 MG tablet Take 2.5 tablets (10 mg) by mouth 2 times daily (with meals) (Patient not taking: Reported on 1/23/2018) 2.5 tablet 0     HYDROcodone-acetaminophen (NORCO) 5-325 MG per tablet Take 1-2 tablets by mouth every 4 hours as needed for moderate to severe pain (Patient not taking: Reported on 1/23/2018) 4 tablet 0     No Known Allergies    Reviewed and updated as needed this visit by clinical staff  Tobacco  Allergies  Meds  Med Hx  Surg Hx  Fam Hx  Soc Hx      Reviewed and updated as needed this visit by Provider         ROS:  C: NEGATIVE for fever, chills, change in weight  E/M: NEGATIVE for ear, mouth and throat problems  R: NEGATIVE for significant cough or SOB  CV: NEGATIVE for chest pain, palpitations or peripheral edema    OBJECTIVE:     /79 (BP Location: Right arm, Cuff Size: Adult Large)  Pulse 98  Temp 98.4  F (36.9  C) (Tympanic)  Ht 6' 3.25\" (1.911 m)  Wt (!) 384 lb (174.2 kg)  SpO2 96%  BMI 47.68 kg/m2  Body mass index is 47.68 kg/(m^2).  GENERAL: healthy, alert and no distress  NECK: no " adenopathy, no asymmetry, masses, or scars and thyroid normal to palpation  RESP: lungs clear to auscultation - no rales, rhonchi or wheezes  CV: regular rate and rhythm, normal S1 S2, no S3 or S4, no murmur, click or rub, no peripheral edema and peripheral pulses strong  FACE  HE HAS VERY MILD SWELLING AROUND THE RIGHT EYE AND THE LESION ON HIS NOSE IS DRAINING AND APPEARS IMPETIGO LIKE RASH.       ASSESSMENT/PLAN:     ASSESSMENT/PLAN: IM ROCEPHIN, ORAL CLEOCIN AND REVISIT TOMORROW.       ICD-10-CM    1. Periorbital cellulitis of right eye L03.213 clindamycin (CLEOCIN) 300 MG capsule     cefTRIAXone (ROCEPHIN) 1 GM vial       Patient Instructions   1. I am concerned about the swelling around eye.  We need to treat this as infection    2. Injection of strong antibiotic will get this moving faster.    3. Stop the amoxicillin and start the new antiobioitc.     4. Come back tomorrow afternoon.                       Del Alcaraz MD  [facility]

## 2018-01-24 NOTE — TELEPHONE ENCOUNTER
Reason for call:  Patient reporting a symptom    Symptom or request: Swollen Face    Duration (how long have symptoms been present): over night    Have you been treated for this before? Yes    Additional comments: He was seen yesterday for a sinus infection and impetigo on his nose.  When he woke up this morning his whole face was swollen.  His eye was swollen almost shut but his eye is better now.  He took some Ibuprofen.  He is wondering if this is normal with the impetigo?  Please advise.    Phone Number patient can be reached at:  Home number on file [phone] (home)    Best Time:  any    Can we leave a detailed message on this number:  YES    Call taken on 1/24/2018 at 9:03 AM by Mandi Giles

## 2018-01-24 NOTE — TELEPHONE ENCOUNTER
Spoke with pt and he states that he woke this morning with swelling around R eye, nose, cheek. Denies SOB or difficulty swallowing.  Afebrile.  No open areas and no new blisters or sores since OV yesterday.  No visual changes.  States swelling has improved slightly since waking.  Has been taking oral abx as prescribed and is swollen in areas that he did no apply abx ointment.      Would you like pt to take Benadryl or Zyrtec?  NTBS again?    Susie BENAVIDEZ RN

## 2018-01-25 ENCOUNTER — OFFICE VISIT (OUTPATIENT)
Dept: FAMILY MEDICINE | Facility: CLINIC | Age: 33
End: 2018-01-25
Payer: COMMERCIAL

## 2018-01-25 ENCOUNTER — TELEPHONE (OUTPATIENT)
Dept: FAMILY MEDICINE | Facility: CLINIC | Age: 33
End: 2018-01-25

## 2018-01-25 VITALS
BODY MASS INDEX: 39.17 KG/M2 | TEMPERATURE: 97.6 F | WEIGHT: 315 LBS | HEIGHT: 75 IN | SYSTOLIC BLOOD PRESSURE: 134 MMHG | DIASTOLIC BLOOD PRESSURE: 76 MMHG | HEART RATE: 80 BPM

## 2018-01-25 DIAGNOSIS — L03.213 PERIORBITAL CELLULITIS OF RIGHT EYE: Primary | ICD-10-CM

## 2018-01-25 PROCEDURE — 99212 OFFICE O/P EST SF 10 MIN: CPT | Performed by: FAMILY MEDICINE

## 2018-01-25 NOTE — PATIENT INSTRUCTIONS
1. This looks a little improved    2. I will call tomorrow.     3.if better finish antibiotics

## 2018-01-25 NOTE — NURSING NOTE
"Chief Complaint   Patient presents with     Cellulitis     Recheck       Initial /76 (BP Location: Right arm, Cuff Size: Adult Large)  Pulse 80  Temp 97.6  F (36.4  C) (Tympanic)  Ht 6' 3.25\" (1.911 m)  Wt (!) 382 lb (173.3 kg)  BMI 47.43 kg/m2 Estimated body mass index is 47.43 kg/(m^2) as calculated from the following:    Height as of this encounter: 6' 3.25\" (1.911 m).    Weight as of this encounter: 382 lb (173.3 kg).  Medication Reconciliation: complete    Health Maintenance that is potentially due pending provider review:  NONE    Is there anyone who you would like to be able to receive your results? No  If yes have patient fill out [facility]      "

## 2018-01-25 NOTE — PROGRESS NOTES
SUBJECTIVE:   Pop Garcia is a 32 year old male who presents to clinic today for the following health issues:    Patient is here to follow up with his cellulitis. He thinks his face is more swollen today.  Treated yesterday for periorbital cellulitis and now here for one day follow up.        Problem list and histories reviewed & adjusted, as indicated.  Additional history: as documented    Patient Active Problem List   Diagnosis     Tobacco use disorder     Morbid obesity (H)     Reflux esophagitis     Left axis deviation     CARDIOVASCULAR SCREENING; LDL GOAL LESS THAN 130     Vitamin D deficiency     Attention deficit hyperactivity disorder (ADHD)     Marijuana use, continuous     Chronic frontal sinusitis     Chronic rhinitis     Past Surgical History:   Procedure Laterality Date     GASTROSCOPY,FL  10/2006    eosphagitis,  nonpatent sphincter     LASIK CUSTOMVUE BILATERAL  8/15/2012    Procedure: LASIK CUSTOMVUE BILATERAL;  BILATERAL LASIK CUSTOMVUE  WITH INTRALASE ;  Surgeon: Luis Johnson MD;  Location: [facility]     SURGICAL HISTORY OF -   1991    tonsils & adenoids     WAVESCAN SCREENING  8/15/2012    Procedure: WAVESCAN SCREENING;  BILATERAL WAVESCAN SCREENING ;  Surgeon: Luis Johnson MD;  Location: [facility]       Social History   Substance Use Topics     Smoking status: Current Every Day Smoker     Packs/day: 0.50     Years: 10.00     Types: Cigarettes     Smokeless tobacco: Former User     Types: Chew     Quit date: 4/1/2015      Comment: 1/2 ppd as of 1/5/2011     Alcohol use No     Family History   Problem Relation Age of Onset     DIABETES Maternal Grandmother      HEART DISEASE Brother      hole in heart     CEREBROVASCULAR DISEASE Maternal Grandfather      C.A.D. Maternal Grandfather      Cancer - colorectal No family hx of      Prostate Cancer No family hx of      Anesthesia Reaction No family hx of          Current Outpatient Prescriptions   Medication Sig Dispense Refill     clindamycin  "(CLEOCIN) 300 MG capsule Take 1 capsule (300 mg) by mouth 4 times daily 40 capsule 0     mupirocin (BACTROBAN) 2 % ointment Apply topically 3 times daily for 5 days 22 g 0       Reviewed and updated as needed this visit by clinical staff       Reviewed and updated as needed this visit by Provider         ROS:  C: NEGATIVE for fever, chills, change in weight  E/M: NEGATIVE for ear, mouth and throat problems    OBJECTIVE:     /76 (BP Location: Right arm, Cuff Size: Adult Large)  Pulse 80  Temp 97.6  F (36.4  C) (Tympanic)  Ht 6' 3.25\" (1.911 m)  Wt (!) 382 lb (173.3 kg)  BMI 47.43 kg/m2  Body mass index is 47.43 kg/(m^2).  GENERAL: healthy, alert and no distress  Face with some swelling around but no redness and the swelling is about the same if not a little improved from yesterday.         ASSESSMENT/PLAN:     ASSESSMENT/PLAN:      ICD-10-CM    1. Periorbital cellulitis of right eye L03.213        Patient Instructions   1. This looks a little improved    2. I will call tomorrow.     3.if better finish antibiotics                        ASSESSMENT/PLAN:      ICD-10-CM    1. Periorbital cellulitis of right eye L03.213        Patient Instructions   1. This looks a little improved    2. I will call tomorrow.     3.if better finish antibiotics              Del Alcaraz MD  [facility]  "

## 2018-01-25 NOTE — MR AVS SNAPSHOT
"              After Visit Summary   2018    Pop Garcia    MRN: [id number]           Patient Information     Date Of Birth          1985        Visit Information        Provider Department      2018 1:40 PM Del Alcaraz MD [facility]        Care Instructions    1. This looks a little improved    2. I will call tomorrow.     3.if better finish antibiotics              Follow-ups after your visit        Who to contact     If you have questions or need follow up information about today's clinic visit or your schedule please contact [facility] directly at [phone].  Normal or non-critical lab and imaging results will be communicated to you by [employer], letter or phone within 4 business days after the clinic has received the results. If you do not hear from us within 7 days, please contact the clinic through [employer] or phone. If you have a critical or abnormal lab result, we will notify you by phone as soon as possible.  Submit refill requests through [employer] or call your pharmacy and they will forward the refill request to us. Please allow 3 business days for your refill to be completed.          Additional Information About Your Visit        MyChart Information     [employer] lets you send messages to your doctor, view your test results, renew your prescriptions, schedule appointments and more. To sign up, go to [email or web address] . Click on \"Log in\" on the left side of the screen, which will take you to the Welcome page. Then click on \"Sign up Now\" on the right side of the page.     You will be asked to enter the access code listed below, as well as some personal information. Please follow the directions to create your username and password.     Your access code is: [email or web address]  Expires: 2018  8:35 AM     Your access code will  in 90 days. If you need help or a new code, please call your [facility] or [phone].        Care " "EveryWhere ID     This is your Care EveryWhere ID. This could be used by other organizations to access your [place] medical records  [id number]        Your Vitals Were     Pulse Temperature Height BMI (Body Mass Index)          80 97.6  F (36.4  C) (Tympanic) 6' 3.25\" (1.911 m) 47.43 kg/m2         Blood Pressure from Last 3 Encounters:   01/25/18 134/76   01/24/18 140/79   01/23/18 138/78    Weight from Last 3 Encounters:   01/25/18 (!) 382 lb (173.3 kg)   01/24/18 (!) 384 lb (174.2 kg)   01/23/18 (!) 381 lb 9.6 oz (173.1 kg)              Today, you had the following     No orders found for display       Primary Care Provider Office Phone # Fax #    [facility] [phone] [address] [facility] [address]        Equal Access to Services     RAULITO MAGAÑA : Hadii aad ku hadasho Soomaali, waaxda luqadaha, qaybta kaalmada adeegyada, waxay edisonin kelly hatch . So [place] [phone].    ATENCIÓN: Si habla español, tiene a vargas disposición servicios gratuitos de asistencia lingüística. Constantinoame al [phone].    We comply with applicable federal civil rights laws and [place] laws. We do not discriminate on the basis of race, color, national origin, age, disability, sex, sexual orientation, or gender identity.            Thank you!     Thank you for choosing [facility]  for your care. Our goal is always to provide you with excellent care. Hearing back from our patients is one way we can continue to improve our services. Please take a few minutes to complete the written survey that you may receive in the mail after your visit with us. Thank you!             Your Updated Medication List - Protect others around you: Learn how to safely use, store and throw away your medicines at www.disposemymeds.org.          This list is accurate as of 1/25/18  1:48 PM.  Always use your most recent med list.                   Brand Name Dispense Instructions for use " Diagnosis    clindamycin 300 MG capsule    CLEOCIN    40 capsule    Take 1 capsule (300 mg) by mouth 4 times daily    Periorbital cellulitis of right eye       mupirocin 2 % ointment    BACTROBAN    22 g    Apply topically 3 times daily for 5 days    Impetigo

## 2018-03-14 ENCOUNTER — OFFICE VISIT (OUTPATIENT)
Dept: FAMILY MEDICINE | Facility: CLINIC | Age: 33
End: 2018-03-14
Payer: COMMERCIAL

## 2018-03-14 VITALS
SYSTOLIC BLOOD PRESSURE: 138 MMHG | DIASTOLIC BLOOD PRESSURE: 82 MMHG | BODY MASS INDEX: 39.17 KG/M2 | WEIGHT: 315 LBS | TEMPERATURE: 97.9 F | HEART RATE: 95 BPM | HEIGHT: 75 IN

## 2018-03-14 DIAGNOSIS — M54.50 ACUTE LEFT-SIDED LOW BACK PAIN WITHOUT SCIATICA: Primary | ICD-10-CM

## 2018-03-14 PROCEDURE — 99213 OFFICE O/P EST LOW 20 MIN: CPT | Performed by: NURSE PRACTITIONER

## 2018-03-14 RX ORDER — CYCLOBENZAPRINE HCL 10 MG
5-10 TABLET ORAL 3 TIMES DAILY PRN
Qty: 30 TABLET | Refills: 1 | Status: SHIPPED | OUTPATIENT
Start: 2018-03-14 | End: 2018-03-15

## 2018-03-14 RX ORDER — TRAMADOL HYDROCHLORIDE 50 MG/1
50 TABLET ORAL 2 TIMES DAILY PRN
Qty: 15 TABLET | Refills: 0 | Status: SHIPPED | OUTPATIENT
Start: 2018-03-14 | End: 2019-04-10

## 2018-03-14 NOTE — MR AVS SNAPSHOT
"              After Visit Summary   3/14/2018    Pop Garcia    MRN: [id number]           Patient Information     Date Of Birth          1985        Visit Information        Provider Department      3/14/2018 7:40 AM Irma Campos APRN CNP [facility]        Today's Diagnoses     Acute left-sided low back pain without sciatica    -  1      Care Instructions    Avoid heavy lifting  Flexeril 5-10 mg 3 times daily as needed for pain  Tramadol 50 mg twice daily as needed for severe pain, do not take it with Flexeril at the same time, at least 2-4 hrs apart   Continue Ibuprofen and Tylenol as needed  Heating pad as needed  Lidocaine cream, over the counter twice daily as needed for pain            Follow-ups after your visit        Who to contact     If you have questions or need follow up information about today's clinic visit or your schedule please contact [facility] directly at [phone].  Normal or non-critical lab and imaging results will be communicated to you by GoCrossCampushart, letter or phone within 4 business days after the clinic has received the results. If you do not hear from us within 7 days, please contact the clinic through GoCrossCampushart or phone. If you have a critical or abnormal lab result, we will notify you by phone as soon as possible.  Submit refill requests through App[employer]ando or call your pharmacy and they will forward the refill request to us. Please allow 3 business days for your refill to be completed.          Additional Information About Your Visit        MyChart Information     [employer]ando lets you send messages to your doctor, view your test results, renew your prescriptions, schedule appointments and more. To sign up, go to [email or web address] . Click on \"Log in\" on the left side of the screen, which will take you to the Welcome page. Then click on \"Sign up Now\" on the right side of the page.     You will be asked to enter the access code listed below, as " "well as some personal information. Please follow the directions to create your username and password.     Your access code is: [email or web address]  Expires: 2018  9:35 AM     Your access code will  in 90 days. If you need help or a new code, please call your [place] clinic or [phone].        Care EveryWhere ID     This is your Care EveryWhere ID. This could be used by other organizations to access your [place] medical records  [id number]        Your Vitals Were     Pulse Temperature Height BMI (Body Mass Index)          95 97.9  F (36.6  C) (Tympanic) 6' 3.25\" (1.911 m) 48.55 kg/m2         Blood Pressure from Last 3 Encounters:   18 138/82   18 134/76   18 140/79    Weight from Last 3 Encounters:   18 (!) 391 lb (177.4 kg)   18 (!) 382 lb (173.3 kg)   18 (!) 384 lb (174.2 kg)              Today, you had the following     No orders found for display         Today's Medication Changes          These changes are accurate as of 3/14/18  8:03 AM.  If you have any questions, ask your nurse or doctor.               Start taking these medicines.        Dose/Directions    cyclobenzaprine 10 MG tablet   Commonly known as:  FLEXERIL   Used for:  Acute left-sided low back pain without sciatica   Started by:  Irma Campos APRN CNP        Dose:  5-10 mg   Take 0.5-1 tablets (5-10 mg) by mouth 3 times daily as needed for muscle spasms   Quantity:  30 tablet   Refills:  1       traMADol 50 MG tablet   Commonly known as:  ULTRAM   Used for:  Acute left-sided low back pain without sciatica   Started by:  Irma Campos APRN CNP        Dose:  50 mg   Take 1 tablet (50 mg) by mouth 2 times daily as needed for pain maximum 2 tablet(s) per day   Quantity:  15 tablet   Refills:  0            Where to get your medicines      These medications were sent to [facility] [place], [place] - [address]  [address] [address]     Phone:  " [phone]     cyclobenzaprine 10 MG tablet         Some of these will need a paper prescription and others can be bought over the counter.  Ask your nurse if you have questions.     Bring a paper prescription for each of these medications     traMADol 50 MG tablet                Primary Care Provider Office Phone # Fax #    [facility] [place] [phone] [address] [address]        Equal Access to Services     RAULITO MAGAÑA : Hadii aad ku hadasho Soomaali, waaxda luqadaha, qaybta kaalmada adeegyada, waxay idiin hayaan adeeg khanaliash laambrose paulino. So [facility] [phone].    ATENCIÓN: Si martell amador, tiene a vargas disposición servicios gratuitos de asistencia lingüística. Mello al [phone].    We comply with applicable federal civil rights laws and [place] laws. We do not discriminate on the basis of race, color, national origin, age, disability, sex, sexual orientation, or gender identity.            Thank you!     Thank you for choosing [facility]  for your care. Our goal is always to provide you with excellent care. Hearing back from our patients is one way we can continue to improve our services. Please take a few minutes to complete the written survey that you may receive in the mail after your visit with us. Thank you!             Your Updated Medication List - Protect others around you: Learn how to safely use, store and throw away your medicines at www.disposemymeds.org.          This list is accurate as of 3/14/18  8:03 AM.  Always use your most recent med list.                   Brand Name Dispense Instructions for use Diagnosis    cyclobenzaprine 10 MG tablet    FLEXERIL    30 tablet    Take 0.5-1 tablets (5-10 mg) by mouth 3 times daily as needed for muscle spasms    Acute left-sided low back pain without sciatica       traMADol 50 MG tablet    ULTRAM    15 tablet    Take 1 tablet (50 mg) by mouth 2 times daily as needed for pain maximum 2  tablet(s) per day    Acute left-sided low back pain without sciatica

## 2018-03-14 NOTE — NURSING NOTE
"Chief Complaint   Patient presents with     Back Pain     Slipped on the ice on Sunday        Initial /82  Pulse 95  Temp 97.9  F (36.6  C) (Tympanic)  Ht 6' 3.25\" (1.911 m)  Wt (!) 391 lb (177.4 kg)  BMI 48.55 kg/m2 Estimated body mass index is 48.55 kg/(m^2) as calculated from the following:    Height as of this encounter: 6' 3.25\" (1.911 m).    Weight as of this encounter: 391 lb (177.4 kg).  Medication Reconciliation: complete  "

## 2018-03-14 NOTE — PROGRESS NOTES
"  SUBJECTIVE:   Pop Garcia is a 32 year old male who presents to clinic today for the following health issues:    Back Pain       Duration: 4 days         Specific cause: Slipped on the ice, didn't fall but caught himself in a way where he hurt his back     Description:   Location of pain: low back left  Character of pain: sharp pain all the time, worse with movement   Pain radiation:none  New numbness or weakness in legs, not attributed to pain:  no     Intensity: Currently 7/10    History:   Pain interferes with job: YES  History of back problems: no prior back problems  Any previous MRI or X-rays: None  Sees a specialist for back pain:  No  Therapies tried without relief: ibuprofen, tylenol, heating pad     Alleviating factors:   Improved by: none       Precipitating factors:  Worsened by: Lifting and Bending    Functional and Psychosocial Screen (Jan STarT Back):      Not performed today    Problem list and histories reviewed & adjusted, as indicated.  Additional history: as documented    Labs reviewed in EPIC    Reviewed and updated as needed this visit by clinical staff  Tobacco  Allergies  Med Hx  Surg Hx  Fam Hx  Soc Hx      Reviewed and updated as needed this visit by Provider         ROS:  Constitutional, HEENT, cardiovascular, pulmonary, gi and gu systems are negative, except as otherwise noted.    OBJECTIVE:     /82  Pulse 95  Temp 97.9  F (36.6  C) (Tympanic)  Ht 6' 3.25\" (1.911 m)  Wt (!) 391 lb (177.4 kg)  BMI 48.55 kg/m2  Body mass index is 48.55 kg/(m^2).  GENERAL: healthy, alert and no distress  MS: no gross musculoskeletal defects noted, no edema  Comprehensive back pain exam:  Tenderness of left lower back, Pain limits the following motions: turning, standing and bending, Lower extremity sensation normal and equal on both sides and Straight leg raise negative bilaterally  PSYCH: mentation appears normal, affect normal/bright    Diagnostic Test Results:  none     ASSESSMENT/PLAN: "     1. Acute left-sided low back pain without sciatica  - cyclobenzaprine (FLEXERIL) 10 MG tablet; Take 0.5-1 tablets (5-10 mg) by mouth 3 times daily as needed for muscle spasms  Dispense: 30 tablet; Refill: 1  - traMADol (ULTRAM) 50 MG tablet; Take 1 tablet (50 mg) by mouth 2 times daily as needed for pain maximum 2 tablet(s) per day  Dispense: 15 tablet; Refill: 0  -heating pad as needed  -continue alternating Ibuprofen 200-400 mg every 6 hrs and Tylenol 500 mg 4 times daily   -avoid heavy lifting over 10 lbs for 1 week     See Patient Instructions    KELSY Cop[facility]

## 2018-03-14 NOTE — PATIENT INSTRUCTIONS
Avoid heavy lifting  Flexeril 5-10 mg 3 times daily as needed for pain  Tramadol 50 mg twice daily as needed for severe pain, do not take it with Flexeril at the same time, at least 2-4 hrs apart   Continue Ibuprofen and Tylenol as needed  Heating pad as needed  Lidocaine cream, over the counter twice daily as needed for pain

## 2018-03-15 ENCOUNTER — TELEPHONE (OUTPATIENT)
Dept: FAMILY MEDICINE | Facility: CLINIC | Age: 33
End: 2018-03-15

## 2018-03-15 ENCOUNTER — NURSE TRIAGE (OUTPATIENT)
Dept: NURSING | Facility: CLINIC | Age: 33
End: 2018-03-15

## 2018-03-15 DIAGNOSIS — M54.50 ACUTE LEFT-SIDED LOW BACK PAIN WITHOUT SCIATICA: Primary | ICD-10-CM

## 2018-03-15 RX ORDER — METHOCARBAMOL 500 MG/1
500-1000 TABLET, FILM COATED ORAL 3 TIMES DAILY PRN
Qty: 30 TABLET | Refills: 1 | Status: SHIPPED | OUTPATIENT
Start: 2018-03-15 | End: 2019-04-10

## 2018-03-15 NOTE — TELEPHONE ENCOUNTER
Flexeril 10 mg 3 times daily as needed is maximum he can take. I changed Flexeril to Robaxin 500-1000 mg 3 times daily as needed for pain, sometimes it works better for pain and spasms.     He can try Robaxin instead of Flexeril.    Irma Campos, KELSY CNP

## 2018-03-15 NOTE — TELEPHONE ENCOUNTER
Left a message for pt that this new prescription has been sent and that these two prescriptions should not be taken together.  Will call tomorrow to ensure that he received information.    Susie BENAVIDEZ RN

## 2018-03-15 NOTE — TELEPHONE ENCOUNTER
Spoke with pt and he states that he continues to have continued back pain. 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      Pt took 10 mg flexeril TID yesterday and one tablet today.  Tramadol was taken once last evening.  None today.  Pt is attempting to work today and hasn't taken any tramadol due to being at work.      Discussed the need for pt to rest his back and apply the 10 pound lifting restriction given by Irma.  Discussed body movements to protect back.  Discussed alternating dosing of Tylenol and Ibuprofen throughout the day.    Pt asking if there is an increase in Flexeril which he can take safely at work?    Susie BENAVIDEZ RN

## 2018-03-15 NOTE — TELEPHONE ENCOUNTER
Reason for Call:  Other back pain    Detailed comments: Patient was seen yesterday for back pain.  He was given Flexeril and Tramadol. He states they are not working and he needs something stronger.    Phone Number Patient can be reached at: Home number on file [phone] (home)    Best Time: any    Can we leave a detailed message on this number? YES    Call taken on 3/15/2018 at 12:32 PM by Sabina Rushing

## 2018-03-15 NOTE — TELEPHONE ENCOUNTER
Pop is calling and states that he just missed a call from clinic.  FNA relayed message about  Robaxin is at pharmacy.

## 2018-03-16 NOTE — TELEPHONE ENCOUNTER
Spoke with pt this AM and he received the message last evening regarding robaxin and flexeril.  Pt will try robaxin today in an effort to continue working and manage pain.    Susie BENAVIDEZ RN

## 2019-04-10 ENCOUNTER — APPOINTMENT (OUTPATIENT)
Dept: GENERAL RADIOLOGY | Facility: CLINIC | Age: 34
End: 2019-04-10
Attending: NURSE PRACTITIONER

## 2019-04-10 ENCOUNTER — HOSPITAL ENCOUNTER (EMERGENCY)
Facility: CLINIC | Age: 34
Discharge: HOME | End: 2019-04-10
Attending: NURSE PRACTITIONER | Admitting: NURSE PRACTITIONER

## 2019-04-10 VITALS
OXYGEN SATURATION: 98 % | SYSTOLIC BLOOD PRESSURE: 139 MMHG | HEIGHT: 75 IN | WEIGHT: 315 LBS | HEART RATE: 78 BPM | TEMPERATURE: 98 F | DIASTOLIC BLOOD PRESSURE: 88 MMHG | BODY MASS INDEX: 39.17 KG/M2 | RESPIRATION RATE: 16 BRPM

## 2019-04-10 DIAGNOSIS — S60.222A CONTUSION OF LEFT HAND, INITIAL ENCOUNTER: ICD-10-CM

## 2019-04-10 PROCEDURE — G0463 HOSPITAL OUTPT CLINIC VISIT: HCPCS | Performed by: NURSE PRACTITIONER

## 2019-04-10 PROCEDURE — 99214 OFFICE O/P EST MOD 30 MIN: CPT | Mod: Z6 | Performed by: NURSE PRACTITIONER

## 2019-04-10 PROCEDURE — 73130 X-RAY EXAM OF HAND: CPT | Mod: LT

## 2019-04-10 ASSESSMENT — ENCOUNTER SYMPTOMS
EYE PAIN: 0
HEADACHES: 0
CONSTIPATION: 0
DIAPHORESIS: 0
NAUSEA: 0
COUGH: 0
DIARRHEA: 0
CHILLS: 0
DIFFICULTY URINATING: 0
ABDOMINAL PAIN: 0
WOUND: 0
HEMATURIA: 0
CONFUSION: 0
BLOOD IN STOOL: 0
DYSURIA: 0
SHORTNESS OF BREATH: 0
SINUS PAIN: 0
VOMITING: 0
SORE THROAT: 0
SPEECH DIFFICULTY: 0
WHEEZING: 0
FEVER: 0

## 2019-04-10 ASSESSMENT — MIFFLIN-ST. JEOR: SCORE: 2850.02

## 2019-04-10 NOTE — ED AVS SNAPSHOT
[facility]  [address] [address]  Phone:  [phone]  Fax:  [phone]                                    Pop Garcia   MRN: [id number]    Department:  [facility]   Date of Visit:  4/10/2019           After Visit Summary Signature Page    I have received my discharge instructions, and my questions have been answered. I have discussed any challenges I see with this plan with the nurse or doctor.    ..........................................................................................................................................  Patient/Patient Representative Signature      ..........................................................................................................................................  Patient Representative Print Name and Relationship to Patient    ..................................................               ................................................  Date                                   Time    ..........................................................................................................................................  Reviewed by Signature/Title    ...................................................              ..............................................  Date                                               Time          22EPIC Rev 08/18

## 2019-04-11 NOTE — ED PROVIDER NOTES
History     Chief Complaint   Patient presents with     Hand Injury     hit left hand with a hammer     HPI  Pop Garcia is a 33 year old male who admits to past medical history of marijuana use, ADHD, esophagitis, chronic tobacco use and morbid obesity presenting to urgent care with a left hand injury.  Patient states that he was at work and works in plastic injection molding and was utilizing a hammer and he accidentally hit the top of the palm of his hand between his right thumb and second finger with a hammer.  Patient states that he estimates the hammer to be a 1 pound hammer.  Patient reports that his pain is aching and throbbing at rest and rates it a 4 out of a 10 and with any movement becomes stabbing and then rates it an 8 out of a 10.  Patient denies any loss of sensation and reports range of motion is extremely painful.  Patient reports feeling well otherwise and denies any other concerns today.    Allergies:  No Known Allergies    Problem List:    Patient Active Problem List    Diagnosis Date Noted     Chronic frontal sinusitis 12/04/2015     Priority: Medium     Chronic rhinitis 12/04/2015     Priority: Medium     Marijuana use, continuous 10/06/2015     Priority: Medium     Attention deficit hyperactivity disorder (ADHD) 10/07/2014     Priority: Medium     tested at [facility] on 9/17/14 - diagnosis ADHD combined type, severe. paperwork scanned to chart   Problem list name updated by automated process. Provider to review       Vitamin D deficiency 06/25/2012     Priority: Medium     Left axis deviation 01/10/2011     Priority: Medium     CARDIOVASCULAR SCREENING; LDL GOAL LESS THAN 130 01/10/2011     Priority: Medium     Reflux esophagitis 11/28/2006     Priority: Medium     - happens daily.  Makes him throw up.  Was on nexium 40  Mg two times a day  in past.  Never omeprazole. Never anything else.     EGD  10/06 - LA Grade C reflux esophagitis.                 - [Congenital] dilation in  the entire esophagus.                 - The examination was suspicious for an esophageal motility                  Disorder.  - subsequent motility tests - normal        Tobacco use disorder 09/26/2005     Priority: Medium     Still - smokes 1/2 ppd x 9 years.         Morbid obesity (H) 09/26/2005     Priority: Medium        Past Medical History:    Past Medical History:   Diagnosis Date     Attention deficit disorder with hyperactivity(314.01)      Chronic depressive personality disorder      Juvenile osteochondrosis of lower extremity, excluding foot      Morbid obesity (H)      Oppositional defiant disorder of childhood or adolescence      TOBACCO ABUSE-CONTINUOUS        Past Surgical History:    Past Surgical History:   Procedure Laterality Date     LASIK CUSTOMVUE BILATERAL  8/15/2012    Procedure: LASIK CUSTOMVUE BILATERAL;  BILATERAL LASIK CUSTOMVUE  WITH INTRALASE ;  Surgeon: Luis Johnson MD;  Location: [facility]     SURGICAL HISTORY OF -   1991    tonsils & adenoids     WAVESCAN SCREENING  8/15/2012    Procedure: WAVESCAN SCREENING;  BILATERAL WAVESCAN SCREENING ;  Surgeon: Luis Johnson MD;  Location: [facility]     ZZ GASTROSCOPY,FL  10/2006    eosphagitis,  nonpatent sphincter       Family History:    Family History   Problem Relation Age of Onset     Diabetes Maternal Grandmother      Heart Disease Brother         hole in heart     Cerebrovascular Disease Maternal Grandfather      C.A.D. Maternal Grandfather      Cancer - colorectal No family hx of      Prostate Cancer No family hx of      Anesthesia Reaction No family hx of        Social History:  Marital Status:  Single [1]  Social History     Tobacco Use     Smoking status: Current Every Day Smoker     Packs/day: 0.50     Years: 10.00     Pack years: 5.00     Types: Cigarettes     Smokeless tobacco: Former User     Types: Chew     Quit date: 4/1/2015     Tobacco comment: 1/2 ppd as of 1/5/2011   Substance Use Topics     Alcohol use: No     Drug use:  "No        Medications:      No current outpatient medications on file.      Review of Systems   Constitutional: Negative for chills, diaphoresis and fever.   HENT: Negative for ear pain, sinus pain and sore throat.    Eyes: Negative for pain and visual disturbance.   Respiratory: Negative for cough, shortness of breath and wheezing.    Cardiovascular: Negative for chest pain.   Gastrointestinal: Negative for abdominal pain, blood in stool, constipation, diarrhea, nausea and vomiting.   Genitourinary: Negative for difficulty urinating, dysuria and hematuria.   Musculoskeletal:        Left hand pain/injury     Skin: Negative for rash and wound.   Neurological: Negative for speech difficulty and headaches.   Psychiatric/Behavioral: Negative for confusion and self-injury.   All other systems reviewed and are negative.      Physical Exam   BP: 139/88  Pulse: 78  Temp: 98  F (36.7  C)  Resp: 16  Height: 190.5 cm (6' 3\")  Weight: (!) 181.4 kg (400 lb)(stated)  SpO2: 98 %      Physical Exam   Constitutional: He is oriented to person, place, and time. He appears well-developed and well-nourished. No distress.   HENT:   Head: Normocephalic and atraumatic.   Right Ear: External ear normal.   Left Ear: External ear normal.   Nose: Nose normal.   Eyes: Conjunctivae are normal. Right eye exhibits no discharge. Left eye exhibits no discharge.   Cardiovascular: Normal rate, regular rhythm, normal heart sounds and intact distal pulses. Exam reveals no gallop and no friction rub.   No murmur heard.  Pulmonary/Chest: Effort normal and breath sounds normal. No stridor. He has no wheezes.   Abdominal: There is no tenderness.   Musculoskeletal: He exhibits no edema.        Left hand: He exhibits tenderness. He exhibits normal range of motion, no bony tenderness, normal two-point discrimination, normal capillary refill, no deformity, no laceration and no swelling. Normal sensation noted. Normal strength noted.        Hands:  Neurological: " He is alert and oriented to person, place, and time.   Skin: Skin is warm. No rash noted. He is not diaphoretic.   Nursing note and vitals reviewed.      ED Course        Procedures    Results for orders placed or performed during the hospital encounter of 04/10/19 (from the past 24 hour(s))   XR Hand Left G/E 3 Views    Narrative    HAND THREE VIEWS LEFT  4/10/2019 7:37 PM     HISTORY: hit left hand with hammer    COMPARISON: None.    FINDINGS: There is normal osseous alignment.  No acute fractures are  identified.  There is a small osseous structure adjacent to the radial  styloid. This has smooth margins. It could be due to an old avulsion  fracture off the radial styloid.      Impression    IMPRESSION: No acute fractures are identified.       Medications - No data to display    Assessments & Plan (with Medical Decision Making)     I have reviewed the nursing notes.    I have reviewed the findings, diagnosis, plan and need for follow up with the patient.  Pop Garcia is a 33 year old male who admits to past medical history of marijuana use, ADHD, esophagitis, chronic tobacco use and morbid obesity presenting to urgent care with a left hand injury secondary to hitting his hand with a hammer while at work.  Exam as noted above there is no obvious deformities noted x-ray obtained to evaluate for fractures and there is no fractures or dislocations noted on x-ray..  There is no snuffbox tenderness noted on exam either.  Reviewed findings with patient and will treat as a soft tissue contusion.  Discussed Tylenol and ibuprofen for pain management and encourage follow-up as needed if no improvement or lack of resolution in symptoms over the next 10-14 days.  Patient feels comfortable with plan of care.  Patient discharged in stable condition.     Medication List      There are no discharge medications for this visit.         Final diagnoses:   Contusion of left hand, initial encounter       4/10/2019   [facility]  EMERGENCY DEPARTMENT     Amparo Mcdowell, KELSY LAYNE  04/10/19 [id number]

## 2019-04-11 NOTE — DISCHARGE INSTRUCTIONS
You may take Tylenol or ibuprofen as needed for pain.  You may return to work tomorrow with no limitations.  Follow-up in 2 weeks if your symptoms have not resolved.

## 2020-01-27 ENCOUNTER — HOSPITAL ENCOUNTER (EMERGENCY)
Facility: CLINIC | Age: 35
Discharge: HOME | End: 2020-01-27
Attending: EMERGENCY MEDICINE | Admitting: EMERGENCY MEDICINE
Payer: COMMERCIAL

## 2020-01-27 VITALS
BODY MASS INDEX: 38.36 KG/M2 | HEART RATE: 92 BPM | RESPIRATION RATE: 20 BRPM | HEIGHT: 76 IN | SYSTOLIC BLOOD PRESSURE: 146 MMHG | OXYGEN SATURATION: 97 % | DIASTOLIC BLOOD PRESSURE: 77 MMHG | WEIGHT: 315 LBS | TEMPERATURE: 97.7 F

## 2020-01-27 DIAGNOSIS — R00.2 PALPITATIONS: ICD-10-CM

## 2020-01-27 DIAGNOSIS — I49.3 PREMATURE VENTRICULAR CONTRACTIONS: ICD-10-CM

## 2020-01-27 LAB
ANION GAP SERPL CALCULATED.3IONS-SCNC: 6 MMOL/L (ref 3–14)
BASOPHILS # BLD AUTO: 0.1 10E9/L (ref 0–0.2)
BASOPHILS NFR BLD AUTO: 0.5 %
BUN SERPL-MCNC: 11 MG/DL (ref 7–30)
CALCIUM SERPL-MCNC: 8.6 MG/DL (ref 8.5–10.1)
CHLORIDE SERPL-SCNC: 110 MMOL/L (ref 94–109)
CO2 SERPL-SCNC: 25 MMOL/L (ref 20–32)
CREAT SERPL-MCNC: 0.82 MG/DL (ref 0.66–1.25)
DIFFERENTIAL METHOD BLD: NORMAL
EOSINOPHIL # BLD AUTO: 0.1 10E9/L (ref 0–0.7)
EOSINOPHIL NFR BLD AUTO: 1.5 %
ERYTHROCYTE [DISTWIDTH] IN BLOOD BY AUTOMATED COUNT: 12.9 % (ref 10–15)
GFR SERPL CREATININE-BSD FRML MDRD: >90 ML/MIN/{1.73_M2}
GLUCOSE SERPL-MCNC: 106 MG/DL (ref 70–99)
HCT VFR BLD AUTO: 49.1 % (ref 40–53)
HGB BLD-MCNC: 15.9 G/DL (ref 13.3–17.7)
IMM GRANULOCYTES # BLD: 0 10E9/L (ref 0–0.4)
IMM GRANULOCYTES NFR BLD: 0.2 %
LYMPHOCYTES # BLD AUTO: 2.5 10E9/L (ref 0.8–5.3)
LYMPHOCYTES NFR BLD AUTO: 26 %
MAGNESIUM SERPL-MCNC: 2.1 MG/DL (ref 1.6–2.3)
MCH RBC QN AUTO: 28.9 PG (ref 26.5–33)
MCHC RBC AUTO-ENTMCNC: 32.4 G/DL (ref 31.5–36.5)
MCV RBC AUTO: 89 FL (ref 78–100)
MONOCYTES # BLD AUTO: 0.7 10E9/L (ref 0–1.3)
MONOCYTES NFR BLD AUTO: 7.5 %
NEUTROPHILS # BLD AUTO: 6.2 10E9/L (ref 1.6–8.3)
NEUTROPHILS NFR BLD AUTO: 64.3 %
NRBC # BLD AUTO: 0 10*3/UL
NRBC BLD AUTO-RTO: 0 /100
PLATELET # BLD AUTO: 264 10E9/L (ref 150–450)
POTASSIUM SERPL-SCNC: 4.1 MMOL/L (ref 3.4–5.3)
RBC # BLD AUTO: 5.51 10E12/L (ref 4.4–5.9)
SODIUM SERPL-SCNC: 141 MMOL/L (ref 133–144)
TROPONIN I SERPL-MCNC: <0.015 UG/L (ref 0–0.04)
TROPONIN I SERPL-MCNC: <0.015 UG/L (ref 0–0.04)
TSH SERPL DL<=0.005 MIU/L-ACNC: 0.96 MU/L (ref 0.4–4)
WBC # BLD AUTO: 9.6 10E9/L (ref 4–11)

## 2020-01-27 PROCEDURE — 99284 EMERGENCY DEPT VISIT MOD MDM: CPT | Mod: 25

## 2020-01-27 PROCEDURE — 96360 HYDRATION IV INFUSION INIT: CPT

## 2020-01-27 PROCEDURE — 99284 EMERGENCY DEPT VISIT MOD MDM: CPT | Mod: 25 | Performed by: EMERGENCY MEDICINE

## 2020-01-27 PROCEDURE — 93010 ELECTROCARDIOGRAM REPORT: CPT | Mod: Z6 | Performed by: EMERGENCY MEDICINE

## 2020-01-27 PROCEDURE — 84443 ASSAY THYROID STIM HORMONE: CPT | Performed by: EMERGENCY MEDICINE

## 2020-01-27 PROCEDURE — 93005 ELECTROCARDIOGRAM TRACING: CPT

## 2020-01-27 PROCEDURE — 83735 ASSAY OF MAGNESIUM: CPT | Performed by: EMERGENCY MEDICINE

## 2020-01-27 PROCEDURE — 80048 BASIC METABOLIC PNL TOTAL CA: CPT | Performed by: EMERGENCY MEDICINE

## 2020-01-27 PROCEDURE — 85025 COMPLETE CBC W/AUTO DIFF WBC: CPT | Performed by: EMERGENCY MEDICINE

## 2020-01-27 PROCEDURE — 84484 ASSAY OF TROPONIN QUANT: CPT | Performed by: EMERGENCY MEDICINE

## 2020-01-27 PROCEDURE — 25800030 ZZH RX IP 258 OP 636: Performed by: EMERGENCY MEDICINE

## 2020-01-27 RX ORDER — METOPROLOL TARTRATE 25 MG/1
TABLET, FILM COATED ORAL
Qty: 30 TABLET | Refills: 0 | Status: SHIPPED | OUTPATIENT
Start: 2020-01-27 | End: 2020-02-14

## 2020-01-27 RX ORDER — MULTIPLE VITAMINS W/ MINERALS TAB 9MG-400MCG
1 TAB ORAL DAILY
COMMUNITY
End: 2021-05-24

## 2020-01-27 RX ADMIN — SODIUM CHLORIDE 1000 ML: 9 INJECTION, SOLUTION INTRAVENOUS at 16:53

## 2020-01-27 ASSESSMENT — MIFFLIN-ST. JEOR: SCORE: 2815.53

## 2020-01-27 NOTE — LETTER
January 27, 2020      To Whom It May Concern:      Pop Garcia was seen in our Emergency Department today, Monday 01/27/20.  Please excuse him from work today, and tomorrow if needed, due to illness.  Sincerely,        YO Schroeder MD

## 2020-01-27 NOTE — ED PROVIDER NOTES
History     Chief Complaint   Patient presents with     Palpitations     HPI  Pop Garcia is a 34 year old male who developed sudden onset of palpitations described as forceful beats followed by a pause, occurring intermittently with onset 2 days ago, with intermittent symptoms since.  He has a poorly described chest discomfort with these, but no other chest pain and no syncope, shortness of breath, cough, hemoptysis or leg pain or leg swelling. He checked his heart rate at home and it was 80-85.   He reports a history of similar episodes in the past, x 4, but never persisting this long.  No previous palpitation diagnosis and no formal evaluation.  Review of previous records reveal a prior normal Cardiolite stress test in 2011 for abnormal EKG. No prior syncope.  Recently drinking energy drinks/excessive caffeine, but no other identifiable risk factors for precipitation of palpitations or dysrhythmia.  He denies stimulant drug use.  No history of thyroid disease.  He smokes.    Previous Records Reviewed:  January 2011 Cardiolite Stress test: Normal    Allergies:  No Known Allergies    Problem List:    Patient Active Problem List    Diagnosis Date Noted     Chronic frontal sinusitis 12/04/2015     Priority: Medium     Chronic rhinitis 12/04/2015     Priority: Medium     Marijuana use, continuous 10/06/2015     Priority: Medium     Attention deficit hyperactivity disorder (ADHD) 10/07/2014     Priority: Medium     tested at [facility] counseling on 9/17/14 - diagnosis ADHD combined type, severe. paperwork scanned to chart   Problem list name updated by automated process. Provider to review       Vitamin D deficiency 06/25/2012     Priority: Medium     Left axis deviation 01/10/2011     Priority: Medium     CARDIOVASCULAR SCREENING; LDL GOAL LESS THAN 130 01/10/2011     Priority: Medium     Reflux esophagitis 11/28/2006     Priority: Medium     - happens daily.  Makes him throw up.  Was on nexium 40  Mg two times a  day  in past.  Never omeprazole. Never anything else.     EGD  10/06 - LA Grade C reflux esophagitis.                 - [Congenital] dilation in the entire esophagus.                 - The examination was suspicious for an esophageal motility                  Disorder.  - subsequent motility tests - normal        Tobacco use disorder 09/26/2005     Priority: Medium     Still - smokes 1/2 ppd x 9 years.         Morbid obesity (H) 09/26/2005     Priority: Medium        Past Medical History:    Past Medical History:   Diagnosis Date     Attention deficit disorder with hyperactivity(314.01)      Chronic depressive personality disorder      Juvenile osteochondrosis of lower extremity, excluding foot      Morbid obesity (H)      Oppositional defiant disorder of childhood or adolescence      TOBACCO ABUSE-CONTINUOUS        Past Surgical History:    Past Surgical History:   Procedure Laterality Date     LASIK CUSTOMVUE BILATERAL  8/15/2012    Procedure: LASIK CUSTOMVUE BILATERAL;  BILATERAL LASIK CUSTOMVUE  WITH INTRALASE ;  Surgeon: Luis Johnson MD;  Location: [facility]     SURGICAL HISTORY OF -   1991    tonsils & adenoids     WAVESCAN SCREENING  8/15/2012    Procedure: WAVESCAN SCREENING;  BILATERAL WAVESCAN SCREENING ;  Surgeon: Luis Johnson MD;  Location: [facility]     ZZ GASTROSCOPY,FL  10/2006    eosphagitis,  nonpatent sphincter       Family History:    Family History   Problem Relation Age of Onset     Diabetes Maternal Grandmother      Heart Disease Brother         hole in heart     Cerebrovascular Disease Maternal Grandfather      C.A.D. Maternal Grandfather      Cancer - colorectal No family hx of      Prostate Cancer No family hx of      Anesthesia Reaction No family hx of        Social History:  Marital Status:  Single [1]  Social History     Tobacco Use     Smoking status: Current Every Day Smoker     Packs/day: 0.50     Years: 10.00     Pack years: 5.00     Types: Cigarettes     Smokeless tobacco: Former  "User     Types: Chew     Quit date: 4/1/2015     Tobacco comment: 1/2 ppd as of 1/5/2011   Substance Use Topics     Alcohol use: No     Drug use: No        Medications:    metoprolol tartrate (LOPRESSOR) 25 MG tablet  multivitamin w/minerals (MULTI-VITAMIN) tablet        Review of Systems  Chronic sinusitis and sinus headaches.  As mentioned above in the history present illness.  All other systems were reviewed and are negative.    Physical Exam   BP: (!) 146/77  Pulse: 92  Temp: 97.7  F (36.5  C)  Resp: 20  Height: 193 cm (6' 4\")  Weight: (!) 176.9 kg (390 lb)  SpO2: 97 %      Physical Exam  Vitals signs and nursing note reviewed.   Constitutional:       General: He is not in acute distress.     Appearance: Normal appearance. He is well-developed. He is not ill-appearing or diaphoretic.   HENT:      Head: Normocephalic and atraumatic.      Right Ear: External ear normal.      Left Ear: External ear normal.      Nose: Nose normal.      Mouth/Throat:      Mouth: Mucous membranes are moist.   Eyes:      General: No scleral icterus.     Extraocular Movements: Extraocular movements intact.      Conjunctiva/sclera: Conjunctivae normal.   Neck:      Musculoskeletal: Normal range of motion and neck supple.      Trachea: No tracheal deviation.   Cardiovascular:      Rate and Rhythm: Normal rate and regular rhythm.      Heart sounds: Normal heart sounds. No murmur. No friction rub. No gallop.    Pulmonary:      Effort: Pulmonary effort is normal. No respiratory distress.      Breath sounds: Normal breath sounds. No wheezing, rhonchi or rales.   Abdominal:      General: There is no distension.      Palpations: Abdomen is soft.      Tenderness: There is no abdominal tenderness.   Musculoskeletal: Normal range of motion.         General: No swelling or tenderness.      Right lower leg: No edema.      Left lower leg: No edema.   Skin:     General: Skin is warm and dry.      Coloration: Skin is not pale.      Findings: No " erythema or rash.   Neurological:      General: No focal deficit present.      Mental Status: He is alert and oriented to person, place, and time.      Coordination: Coordination normal.   Psychiatric:         Mood and Affect: Mood normal.         Behavior: Behavior normal.         ED Course        Procedures               EKG Interpretation:      Interpreted by Seth Schroeder MD  Time reviewed: Upon completion  Symptoms at time of EKG: Palpitations  Rhythm: normal sinus   Rate: normal  Axis: Leftward  Ectopy: none  Conduction: Possible left anterior fascicular block, old change.  ST Segments/ T Waves: No ST-T wave changes  Q Waves: none  Comparison to prior: Unchanged from 1/5/2011  Clinical Impression: No acute abnormality, unremarkable EKG           Results for orders placed or performed during the hospital encounter of 01/27/20 (from the past 24 hour(s))   CBC with platelets differential   Result Value Ref Range    WBC 9.6 4.0 - 11.0 10e9/L    RBC Count 5.51 4.4 - 5.9 10e12/L    Hemoglobin 15.9 13.3 - 17.7 g/dL    Hematocrit 49.1 40.0 - 53.0 %    MCV 89 78 - 100 fl    MCH 28.9 26.5 - 33.0 pg    MCHC 32.4 31.5 - 36.5 g/dL    RDW 12.9 10.0 - 15.0 %    Platelet Count 264 150 - 450 10e9/L    Diff Method Automated Method     % Neutrophils 64.3 %    % Lymphocytes 26.0 %    % Monocytes 7.5 %    % Eosinophils 1.5 %    % Basophils 0.5 %    % Immature Granulocytes 0.2 %    Nucleated RBCs 0 0 /100    Absolute Neutrophil 6.2 1.6 - 8.3 10e9/L    Absolute Lymphocytes 2.5 0.8 - 5.3 10e9/L    Absolute Monocytes 0.7 0.0 - 1.3 10e9/L    Absolute Eosinophils 0.1 0.0 - 0.7 10e9/L    Absolute Basophils 0.1 0.0 - 0.2 10e9/L    Abs Immature Granulocytes 0.0 0 - 0.4 10e9/L    Absolute Nucleated RBC 0.0    Basic metabolic panel   Result Value Ref Range    Sodium 141 133 - 144 mmol/L    Potassium 4.1 3.4 - 5.3 mmol/L    Chloride 110 (H) 94 - 109 mmol/L    Carbon Dioxide 25 20 - 32 mmol/L    Anion Gap 6 3 - 14 mmol/L    Glucose 106 (H)  70 - 99 mg/dL    Urea Nitrogen 11 7 - 30 mg/dL    Creatinine 0.82 0.66 - 1.25 mg/dL    GFR Estimate >90 >60 mL/min/[1.73_m2]    GFR Estimate If Black >90 >60 mL/min/[1.73_m2]    Calcium 8.6 8.5 - 10.1 mg/dL   Troponin I   Result Value Ref Range    Troponin I ES <0.015 0.000 - 0.045 ug/L   Troponin I   Result Value Ref Range    Troponin I ES <0.015 0.000 - 0.045 ug/L   Magnesium   Result Value Ref Range    Magnesium 2.1 1.6 - 2.3 mg/dL   TSH with free T4 reflex   Result Value Ref Range    TSH 0.96 0.40 - 4.00 mU/L       Medications   0.9% sodium chloride BOLUS (0 mLs Intravenous Stopped 1/27/20 1757)     Symptomatic PVCs observed on cardiac monitor.  Occasional brief bigeminal PVCs for 2 or 3 beats, no runs of PVCs or other ectopy or dysrhythmia.    Assessments & Plan (with Medical Decision Making)   Approximately 48 hours of symptomatic PVCs identified on cardiac monitoring in the ED.  Evaluation otherwise unremarkable, including serial troponins.  He appears stable for discharge home with plan for a 48-hour Holter monitor study and clinic follow-up.  Doubt malignant dysrhythmia or atypical ACS or PE/DVT.  He can try metoprolol tartrate as needed, and he was instructed on avoiding caffeine and smoking.  I recommended he follow-up in primary care clinic as soon as possible and he was provided instructions for supportive care and will return as needed for worsened condition or worsening symptoms, syncope, or new problems or concerns.      I have reviewed the nursing notes.    I have reviewed the findings, diagnosis, plan and need for follow up with the patient.    Discharge Medication List as of 1/27/2020  5:57 PM      START taking these medications    Details   metoprolol tartrate (LOPRESSOR) 25 MG tablet 1 tablet up to twice daily as needed for palpitations., Disp-30 tablet, R-0, E-Prescribe             Final diagnoses:   Palpitations   Premature ventricular contractions       1/27/2020   [facility]  DEPARTMENT     Elda, Seth BENAVIDEZ MD  01/27/20 6071

## 2020-01-27 NOTE — ED AVS SNAPSHOT
[facility] Emergency Department  [address] [address]  Phone:  [phone]  Fax:  [phone]                                    Pop Garcia   MRN: [id number]    Department:  [facility] Emergency Department   Date of Visit:  1/27/2020           After Visit Summary Signature Page    I have received my discharge instructions, and my questions have been answered. I have discussed any challenges I see with this plan with the nurse or doctor.    ..........................................................................................................................................  Patient/Patient Representative Signature      ..........................................................................................................................................  Patient Representative Print Name and Relationship to Patient    ..................................................               ................................................  Date                                   Time    ..........................................................................................................................................  Reviewed by Signature/Title    ...................................................              ..............................................  Date                                               Time          22EPIC Rev 08/18

## 2020-02-04 ENCOUNTER — OFFICE VISIT (OUTPATIENT)
Dept: FAMILY MEDICINE | Facility: CLINIC | Age: 35
End: 2020-02-04
Payer: COMMERCIAL

## 2020-02-04 VITALS
SYSTOLIC BLOOD PRESSURE: 138 MMHG | HEART RATE: 87 BPM | RESPIRATION RATE: 20 BRPM | DIASTOLIC BLOOD PRESSURE: 64 MMHG | TEMPERATURE: 97.9 F | BODY MASS INDEX: 47.52 KG/M2 | OXYGEN SATURATION: 94 % | WEIGHT: 315 LBS

## 2020-02-04 DIAGNOSIS — R52 GENERALIZED BODY ACHES: ICD-10-CM

## 2020-02-04 DIAGNOSIS — J01.10 SUBACUTE FRONTAL SINUSITIS: ICD-10-CM

## 2020-02-04 DIAGNOSIS — J10.1 INFLUENZA A: Primary | ICD-10-CM

## 2020-02-04 LAB
FLUAV+FLUBV AG SPEC QL: NEGATIVE
FLUAV+FLUBV AG SPEC QL: POSITIVE
SPECIMEN SOURCE: ABNORMAL

## 2020-02-04 PROCEDURE — 87804 INFLUENZA ASSAY W/OPTIC: CPT | Performed by: INTERNAL MEDICINE

## 2020-02-04 PROCEDURE — 99214 OFFICE O/P EST MOD 30 MIN: CPT | Performed by: INTERNAL MEDICINE

## 2020-02-04 RX ORDER — OSELTAMIVIR PHOSPHATE 75 MG/1
75 CAPSULE ORAL 2 TIMES DAILY
Qty: 10 CAPSULE | Refills: 0 | Status: SHIPPED | OUTPATIENT
Start: 2020-02-04 | End: 2020-03-02

## 2020-02-04 RX ORDER — METHOCARBAMOL 500 MG/1
500-1000 TABLET, FILM COATED ORAL 3 TIMES DAILY PRN
Qty: 60 TABLET | Refills: 1 | Status: SHIPPED | OUTPATIENT
Start: 2020-02-04 | End: 2020-10-26

## 2020-02-04 NOTE — LETTER
[facility]  [address] [address]  Phone: [phone]    February 4, 2020        Pop Garcia  [address] [facility] [address]          To whom it may concern:    RE: Pop Garcia    Patient was seen and treated today at our clinic and missed work due to illness today.  He should also be excused from work tomorrow.  He can return on Thursday, Feb 6th, 2020 if feeling improved.     Please contact me for questions or concerns.      Sincerely,        Marvin Ramirez MD

## 2020-02-04 NOTE — PATIENT INSTRUCTIONS
Try Coricidin HBP products for the symptoms.   Patient Education     Influenza (Adult)    Influenza is also called the flu. It is a viral illness that affects the air passages of your lungs. It is different from the common cold. The flu can easily be passed from one to person to another. It may be spread through the air by coughing and sneezing. Or it can be spread by touching the sick person and then touching your own eyes, nose, or mouth.  The flu starts 1 to 3 days after you are exposed to the flu virus. It may last for 1 to 2 weeks but many people feel tired or fatigued for many weeks afterward. You usually don t need to take antibiotics unless you have a complication. This might be an ear or sinus infection or pneumonia.  Symptoms of the flu may be mild or severe. They can include extreme tiredness (wanting to stay in bed all day), chills, fevers, muscle aches, soreness with eye movement, headache, and a dry, hacking cough.  Home care  Follow these guidelines when caring for yourself at home:    Avoid being around cigarette smoke, whether yours or other people s.    Acetaminophen or ibuprofen will help ease your fever, muscle aches, and headache. Don t give aspirin to anyone younger than 18 who has the flu. Aspirin can harm the liver.    Nausea and loss of appetite are common with the flu. Eat light meals. Drink 6 to 8 glasses of liquids every day. Good choices are water, sport drinks, soft drinks without caffeine, juices, tea, and soup. Extra fluids will also help loosen secretions in your nose and lungs.    Over-the-counter cold medicines will not make the flu go away faster. But the medicines may help with coughing, sore throat, and congestion in your nose and sinuses. Don t use a decongestant if you have high blood pressure.    Stay home until your fever has been gone for at least 24 hours without using medicine to reduce fever.  Follow-up care  Follow up with your healthcare provider, or as advised, if you  are not getting better over the next week.  If you are age 65 or older, talk with your provider about getting a pneumococcal vaccine every 5 years. You should also get this vaccine if you have chronic asthma or COPD. All adults should get a flu vaccine every fall. Ask your provider about this.  When to seek medical advice  Call your healthcare provider right away if any of these occur:    Cough with lots of colored mucus (sputum) or blood in your mucus    Chest pain, shortness of breath, wheezing, or trouble breathing    Severe headache, or face, neck, or ear pain    New rash with fever    Fever of 100.4 F (38 C) or higher, or as directed by your healthcare provider    Confusion, behavior change, or seizure    Severe weakness or dizziness    You get a new fever or cough after getting better for a few days  Date Last Reviewed: 1/1/2017 2000-2019 [employer]. [address] [place] [address], [place] [address]. All rights reserved. This information is not intended as a substitute for professional medical care. Always follow your healthcare professional's instructions.

## 2020-02-04 NOTE — PROGRESS NOTES
Subjective     Pop Garcia is a 34 year old male who presents to clinic today for the following health issues:  Chief Complaint   Patient presents with     Cough     x 2 days        HPI   ENT Symptoms             Symptoms: cc Present Absent Comment   Fever/Chills  Subjective  Felt feverish at home last night, cold sweats   Fatigue  x     Muscle Aches  x  Joints and muscle sore    Eye Irritation   x    Sneezing   x    Nasal Bj/Drg  x     Sinus Pressure/Pain x x  Sinus infection for years, get occasionally worse and gets antibiotics.  Has been worse for about 2 months recently.  Has seen ENT, had a CT- sinus surgery was recommended but he is not interested.  He did have antibiotics about 6 months ago (he doesn't recall what it was)   Loss of smell  x     Dental pain   x    Sore Throat  x  Severe, can swallow okay   Swollen Glands  x     Ear Pain/Fullness   x    Cough x x  Productive    Wheeze   x    Chest Pain   x    Shortness of breath  x     Rash       Other         Symptom duration:  x 2 days    Symptom severity:  mild    Treatments tried:     Contacts:  no known          Patient Active Problem List   Diagnosis     Tobacco use disorder     Morbid obesity (H)     Reflux esophagitis     Left axis deviation     CARDIOVASCULAR SCREENING; LDL GOAL LESS THAN 130     Vitamin D deficiency     Attention deficit hyperactivity disorder (ADHD)     Marijuana use, continuous     Chronic frontal sinusitis     Chronic rhinitis     Past Surgical History:   Procedure Laterality Date     LASIK CUSTOMVUE BILATERAL  8/15/2012    Procedure: LASIK CUSTOMVUE BILATERAL;  BILATERAL LASIK CUSTOMVUE  WITH INTRALASE ;  Surgeon: Luis Johnson MD;  Location: [facility]     SURGICAL HISTORY OF -   1991    tonsils & adenoids     WAVESCAN SCREENING  8/15/2012    Procedure: WAVESCAN SCREENING;  BILATERAL WAVESCAN SCREENING ;  Surgeon: Luis Johnson MD;  Location: [facility]     Z GASTROSCOPY,FL  10/2006    eosphagitis,  nonpatent sphincter        Social History     Tobacco Use     Smoking status: Current Every Day Smoker     Packs/day: 0.50     Years: 10.00     Pack years: 5.00     Types: Cigarettes     Smokeless tobacco: Former User     Types: Chew     Quit date: 4/1/2015     Tobacco comment: 1/2 ppd as of 1/5/2011   Substance Use Topics     Alcohol use: No     Family History   Problem Relation Age of Onset     Diabetes Maternal Grandmother      Heart Disease Brother         hole in heart     Cerebrovascular Disease Maternal Grandfather      FRANKIEATashaD. Maternal Grandfather      Cancer - colorectal No family hx of      Prostate Cancer No family hx of      Anesthesia Reaction No family hx of          Current Outpatient Medications   Medication Sig Dispense Refill     amoxicillin-clavulanate (AUGMENTIN) 875-125 MG tablet Take 1 tablet by mouth 2 times daily for 14 days 28 tablet 0     IBUPROFEN PO        methocarbamol (ROBAXIN) 500 MG tablet Take 1-2 tablets (500-1,000 mg) by mouth 3 times daily as needed for muscle spasms 60 tablet 1     multivitamin w/minerals (MULTI-VITAMIN) tablet Take 1 tablet by mouth daily       oseltamivir (TAMIFLU) 75 MG capsule Take 1 capsule (75 mg) by mouth 2 times daily for 5 days 10 capsule 0     metoprolol tartrate (LOPRESSOR) 25 MG tablet 1 tablet up to twice daily as needed for palpitations. (Patient not taking: Reported on 2/4/2020) 30 tablet 0     No Known Allergies    Reviewed and updated as needed this visit by Provider         Review of Systems   ROS COMP: Constitutional, HEENT, pulmonary systems are negative, except as otherwise noted.      Objective    /64 (BP Location: Left arm, Patient Position: Sitting, Cuff Size: Adult Large)   Pulse 87   Temp 97.9  F (36.6  C) (Tympanic)   Resp 20   Wt (!) 177.1 kg (390 lb 6.4 oz)   SpO2 94%   BMI 47.52 kg/m    Body mass index is 47.52 kg/m .  Physical Exam     GENERAL: alert and no distress, occasional coughing, feels slightly diaphoretic  EYES: Eyes grossly normal to  inspection, PERRL and conjunctivae and sclerae normal  HENT: ear canals and TMs normal, frontal sinus is very tender to percussion, nose and mouth without ulcers or lesions, oropharynx red but no tonsillar exudates  NECK: no adenopathy, no asymmetry, masses, or scars  RESP: lungs clear to auscultation - no rales, rhonchi or wheezes  CV: regular rate and rhythm, normal S1 S2, no S3 or S4, no murmur, click or rub     Diagnostic Test Results:  Labs reviewed in Epic  Results for orders placed or performed in visit on 02/04/20 (from the past 24 hour(s))   Influenza A/B antigen   Result Value Ref Range    Influenza A/B Agn Specimen Nasal     Influenza A Positive (A) NEG^Negative    Influenza B Negative NEG^Negative           Assessment & Plan     1. Influenza A    His report of subjective fever and diffuse body aches led to suspicious of influenza, and flu testing was positive for influenza A.  He presents within a couple of days of symptom onset, started Tamiflu to reduce duration of illness.    - oseltamivir (TAMIFLU) 75 MG capsule; Take 1 capsule (75 mg) by mouth 2 times daily for 5 days  Dispense: 10 capsule; Refill: 0    2. Subacute frontal sinusitis    He has chronic sinus issues and these have been worse over the past couple months.  He reports that he gets periodic antibiotics when symptoms are flaring with relief.  Surgery has been recommended but he is not interested in pursuing this as he is content to just get antibiotics as needed with flares.  I advised him that frequent antibiotic use over the long-term would not be advisable due to the risk of developing resistance.  Recommend monitoring to see how often he is getting these.  He states that he will often need a longer course of antibiotics to clear up symptoms, so I prescribed a 14-day course of the Augmentin.    - amoxicillin-clavulanate (AUGMENTIN) 875-125 MG tablet; Take 1 tablet by mouth 2 times daily for 14 days  Dispense: 28 tablet; Refill: 0    3.  Generalized body aches    He reports that for whatever reason when he took Robaxin along with antibiotics in the past his sinus symptoms seem to be more improved and he requests a prescription for Robaxin.    - Influenza A/B antigen  - methocarbamol (ROBAXIN) 500 MG tablet; Take 1-2 tablets (500-1,000 mg) by mouth 3 times daily as needed for muscle spasms  Dispense: 60 tablet; Refill: 1     He was recently in the emergency department for palpitations and is having a Holter placed tomorrow.    Return in about 1 week (around 2/11/2020), or if symptoms worsen or fail to improve.    Marvin Ramirez MD  [facility]    Chart documentation was done using Dragon dictation software. Although reviewed after completion, some errors may remain.

## 2020-02-05 ENCOUNTER — HOSPITAL ENCOUNTER (OUTPATIENT)
Dept: CARDIOLOGY | Facility: CLINIC | Age: 35
Discharge: HOME | End: 2020-02-05
Attending: EMERGENCY MEDICINE | Admitting: EMERGENCY MEDICINE
Payer: COMMERCIAL

## 2020-02-05 DIAGNOSIS — R00.2 PALPITATIONS: ICD-10-CM

## 2020-02-05 DIAGNOSIS — I49.3 PREMATURE VENTRICULAR CONTRACTIONS: ICD-10-CM

## 2020-02-05 PROCEDURE — 93225 XTRNL ECG REC<48 HRS REC: CPT

## 2020-02-05 PROCEDURE — 93227 XTRNL ECG REC<48 HR R&I: CPT | Performed by: INTERNAL MEDICINE

## 2020-02-12 PROBLEM — I49.3 PVC'S (PREMATURE VENTRICULAR CONTRACTIONS): Status: ACTIVE | Noted: 2020-02-12

## 2020-02-13 ENCOUNTER — OFFICE VISIT (OUTPATIENT)
Dept: CARDIOLOGY | Facility: CLINIC | Age: 35
End: 2020-02-13
Payer: COMMERCIAL

## 2020-02-13 VITALS
SYSTOLIC BLOOD PRESSURE: 125 MMHG | WEIGHT: 315 LBS | DIASTOLIC BLOOD PRESSURE: 79 MMHG | HEART RATE: 98 BPM | HEIGHT: 76 IN | BODY MASS INDEX: 38.36 KG/M2

## 2020-02-13 DIAGNOSIS — R00.2 PALPITATIONS: Primary | ICD-10-CM

## 2020-02-13 DIAGNOSIS — F17.200 TOBACCO USE DISORDER: ICD-10-CM

## 2020-02-13 DIAGNOSIS — R07.89 CHEST PRESSURE: ICD-10-CM

## 2020-02-13 DIAGNOSIS — I49.3 PREMATURE VENTRICULAR CONTRACTIONS: ICD-10-CM

## 2020-02-13 PROCEDURE — 99204 OFFICE O/P NEW MOD 45 MIN: CPT | Performed by: INTERNAL MEDICINE

## 2020-02-13 ASSESSMENT — MIFFLIN-ST. JEOR: SCORE: 2838.21

## 2020-02-13 NOTE — LETTER
2/13/2020      [facility]  [address] [address]      RE: Pop Garcia       Dear Colleague,    I had the pleasure of seeing Pop Garcia in the [facility].    Service Date: 02/13/2020      CARDIOLOGY OFFICE PROGRESS NOTE       HISTORY OF PRESENT ILLNESS:  I had the opportunity to see Mr. Pop Garcia in Cardiology Clinic today at the [facility] in [place] for consultation regarding palpitations, chest pressure and Holter monitoring showing frequent PVCs.  Mr. Garcia is a 34-year-old smoker with a physically demanding job.  Several weeks ago while at work, he was experiencing frequent palpitations and started developing pressure in his chest during times of more heavy physical exertion such as climbing ladders and doing some heavy lifting.  The palpitations had been increasing over the previous several days or weeks and the addition of chest pressure made him concerned enough to go into the emergency room for evaluation at [facility].  His visit was on 01/27/2020.  He had 2 negative troponin levels, normal basic metabolic panel, normal TSH and normal CBC.  He had a 12-lead ECG performed showing normal sinus rhythm with leftward axis.  There was some delayed R-wave progression but I suspect that may be due to his large body habitus.  Heart rate was 83.  There were no arrhythmias noted on that ECG.  However, he did have PVCs noted during his evaluation and set up for an outpatient Holter monitor.  That monitor showed consistently sinus rhythm with frequent PVCs, totaling about 26,000 in a 48-hour period.  There was no ventricular tachycardia.  Since that emergency room visit, he has continued to have frequent bothersome palpitations.  The chest discomfort episodes have not been very frequent or prominent since that time.  He had been drinking quite a few energy drinks and coffee in the past but since his emergency room visit,  has cut those out but continues to have the palpitations.  He has cut down his smoking as well to half a pack a day.  He has no lightheadedness, near syncope or syncope.  He denies shortness of breath.        He denies snoring and is not tired during the day.  His wife denies any signs of obstructive sleep apnea.  He was started on a low dose of metoprolol tartrate 25 mg p.o. b.i.d. but felt terrible while taking that medication and stopped it.  He was lightheaded primarily.      PHYSICAL EXAMINATION:     VITAL SIGNS:  Today, his blood pressure is 125/79, heart rate 98 and weight 395 pounds.  He is 6 feet 4 inches tall.     LUNGS:  Clear.     CARDIAC:  His heart rhythm is regular.  He has occasional extrasystoles but no cardiac murmurs.      IMPRESSIONS:  Mr. Pop Garcia is a 34-year-old gentleman with recent palpitations that appear to be due to frequent PVCs based on Holter monitoring.  His PVC burden is about 13% per day.  These are bothersome but not disabling.  He feels that high work stress may be contributing to these.  He has cut down his caffeine use without much change.      He also has had pressure and tightness in his chest associated with exertion, especially climbing ladders and doing some lifting at work.      FAMILY HISTORY:  Significant for some coronary disease in grandparents and some arrhythmia issues in his parents.  His mother had an ablation and his father apparently has atrial fibrillation.  He has no other cardiac risk factors except for smoking.      I recommended that we simulate his exertion at work which seems to bring on the chest pressure as well as the palpitations and PVCs.  I will order a stress echocardiogram.  Even if the echo images are not very satisfactory, I would still like to proceed with that test and determine what his rhythm looks like during exercise.  We can then consider additional evaluation for coronary disease such as a stress nuclear study or stress MRI.      We  discussed the implications of these palpitations.  We will rule out any significant underlying cardiac issues if possible and then it comes down to a decision about whether these arrhythmias are bothersome enough to justify medical treatment.  I would actually propose that he stop smoking and evaluate and treat his anxiety and stress issues first before considering antiarrhythmic drug therapy.  He did not tolerate beta blockers.  Alternatively, he could consider PVC ablation if these issues and symptoms are bothersome enough.  For this, he would likely need to sit down with an electrophysiologist for consultation.  We can certainly offer that if he chooses to go that route.  It may also be helpful with antiarrhythmic drug therapy as a preliminary option.  I will have him follow up with one of our advanced practice providers after his stress test to go over those results and discuss the next options.      Janes Santiago MD, FACC       cc:   [facility]   [address] [place] [address]         JANES SANTIAGO MD, FACC             D: 2020   T: 2020   MT: OMAR      Name:     [employer]   MRN:      [id number]        Account:      [id number]   :      1985           Service Date: 2020      Document: [id number]         Outpatient Encounter Medications as of 2020   Medication Sig Dispense Refill     amoxicillin-clavulanate (AUGMENTIN) 875-125 MG tablet Take 1 tablet by mouth 2 times daily for 14 days 28 tablet 0     IBUPROFEN PO        methocarbamol (ROBAXIN) 500 MG tablet Take 1-2 tablets (500-1,000 mg) by mouth 3 times daily as needed for muscle spasms 60 tablet 1     multivitamin w/minerals (MULTI-VITAMIN) tablet Take 1 tablet by mouth daily       [] oseltamivir (TAMIFLU) 75 MG capsule Take 1 capsule (75 mg) by mouth 2 times daily for 5 days 10 capsule 0     [DISCONTINUED] metoprolol tartrate (LOPRESSOR) 25 MG tablet 1 tablet up to twice daily as needed  for palpitations. (Patient not taking: Reported on 2/4/2020) 30 tablet 0     No facility-administered encounter medications on file as of 2/13/2020.        Again, thank you for allowing me to participate in the care of your patient.      Sincerely,    STEVEN RAMAN MD     [facility]

## 2020-02-13 NOTE — PROGRESS NOTES
Service Date: 02/13/2020      CARDIOLOGY OFFICE PROGRESS NOTE       HISTORY OF PRESENT ILLNESS:  I had the opportunity to see Mr. Pop Garcia in Cardiology Clinic today at the [facility] in [place] for consultation regarding palpitations, chest pressure and Holter monitoring showing frequent PVCs.  Mr. Garcia is a 34-year-old smoker with a physically demanding job.  Several weeks ago while at work, he was experiencing frequent palpitations and started developing pressure in his chest during times of more heavy physical exertion such as climbing ladders and doing some heavy lifting.  The palpitations had been increasing over the previous several days or weeks and the addition of chest pressure made him concerned enough to go into the emergency room for evaluation at [facility].  His visit was on 01/27/2020.  He had 2 negative troponin levels, normal basic metabolic panel, normal TSH and normal CBC.  He had a 12-lead ECG performed showing normal sinus rhythm with leftward axis.  There was some delayed R-wave progression but I suspect that may be due to his large body habitus.  Heart rate was 83.  There were no arrhythmias noted on that ECG.  However, he did have PVCs noted during his evaluation and set up for an outpatient Holter monitor.  That monitor showed consistently sinus rhythm with frequent PVCs, totaling about 26,000 in a 48-hour period.  There was no ventricular tachycardia.  Since that emergency room visit, he has continued to have frequent bothersome palpitations.  The chest discomfort episodes have not been very frequent or prominent since that time.  He had been drinking quite a few energy drinks and coffee in the past but since his emergency room visit, has cut those out but continues to have the palpitations.  He has cut down his smoking as well to half a pack a day.  He has no lightheadedness, near syncope or syncope.  He denies shortness of breath.      He denies  snoring and is not tired during the day.  His wife denies any signs of obstructive sleep apnea.  He was started on a low dose of metoprolol tartrate 25 mg p.o. b.i.d. but felt terrible while taking that medication and stopped it.  He was lightheaded primarily.      PHYSICAL EXAMINATION:     VITAL SIGNS:  Today, his blood pressure is 125/79, heart rate 98 and weight 395 pounds.  He is 6 feet 4 inches tall.     LUNGS:  Clear.     CARDIAC:  His heart rhythm is regular.  He has occasional extrasystoles but no cardiac murmurs.      IMPRESSIONS:  Mr. Pop Garcia is a 34-year-old gentleman with recent palpitations that appear to be due to frequent PVCs based on Holter monitoring.  His PVC burden is about 13% per day.  These are bothersome but not disabling.  He feels that high work stress may be contributing to these.  He has cut down his caffeine use without much change.      He also has had pressure and tightness in his chest associated with exertion, especially climbing ladders and doing some lifting at work.      FAMILY HISTORY:  Significant for some coronary disease in grandparents and some arrhythmia issues in his parents.  His mother had an ablation and his father apparently has atrial fibrillation.  He has no other cardiac risk factors except for smoking.      I recommended that we simulate his exertion at work which seems to bring on the chest pressure as well as the palpitations and PVCs.  I will order a stress echocardiogram.  Even if the echo images are not very satisfactory, I would still like to proceed with that test and determine what his rhythm looks like during exercise.  We can then consider additional evaluation for coronary disease such as a stress nuclear study or stress MRI.      We discussed the implications of these palpitations.  We will rule out any significant underlying cardiac issues if possible and then it comes down to a decision about whether these arrhythmias are bothersome enough to  justify medical treatment.  I would actually propose that he stop smoking and evaluate and treat his anxiety and stress issues first before considering antiarrhythmic drug therapy.  He did not tolerate beta blockers.  Alternatively, he could consider PVC ablation if these issues and symptoms are bothersome enough.  For this, he would likely need to sit down with an electrophysiologist for consultation.  We can certainly offer that if he chooses to go that route.  It may also be helpful with antiarrhythmic drug therapy as a preliminary option.  I will have him follow up with one of our advanced practice providers after his stress test to go over those results and discuss the next options.      Janes Santiago MD, FACC       cc:   [facility]   [address] [place] [address]         JANES SANTIAGO MD, FACC             D: 2020   T: 2020   MT: OMAR      Name:     TRAVON [employer]ING   MRN:      [id number]        Account:      [id number]   :      1985           Service Date: 2020      Document: [id number]

## 2020-02-13 NOTE — PROGRESS NOTES
HPI and Plan:   See dictation    Orders Placed This Encounter   Procedures     Follow-Up with Cardiac Advanced Practice Provider     Exercise Stress Echocardiogram       No orders of the defined types were placed in this encounter.      There are no discontinued medications.      Encounter Diagnoses   Name Primary?     Palpitations Yes     Premature ventricular contractions      Tobacco use disorder      Chest pressure        CURRENT MEDICATIONS:  Current Outpatient Medications   Medication Sig Dispense Refill     amoxicillin-clavulanate (AUGMENTIN) 875-125 MG tablet Take 1 tablet by mouth 2 times daily for 14 days 28 tablet 0     IBUPROFEN PO        methocarbamol (ROBAXIN) 500 MG tablet Take 1-2 tablets (500-1,000 mg) by mouth 3 times daily as needed for muscle spasms 60 tablet 1     multivitamin w/minerals (MULTI-VITAMIN) tablet Take 1 tablet by mouth daily       metoprolol tartrate (LOPRESSOR) 25 MG tablet 1 tablet up to twice daily as needed for palpitations. (Patient not taking: Reported on 2/4/2020) 30 tablet 0       ALLERGIES   No Known Allergies    PAST MEDICAL HISTORY:  Past Medical History:   Diagnosis Date     Attention deficit disorder with hyperactivity(314.01)      Chronic depressive personality disorder     in past not currently.      Juvenile osteochondrosis of lower extremity, excluding foot     Osgood-Schlatters - currently not bothering much     Morbid obesity (H)      Oppositional defiant disorder of childhood or adolescence     as kid     TOBACCO ABUSE-CONTINUOUS        PAST SURGICAL HISTORY:  Past Surgical History:   Procedure Laterality Date     LASIK CUSTOMVUE BILATERAL  8/15/2012    Procedure: LASIK CUSTOMVUE BILATERAL;  BILATERAL LASIK CUSTOMVUE  WITH INTRALASE ;  Surgeon: Luis Johnson MD;  Location: [facility]     SURGICAL HISTORY OF -   1991    tonsils & adenoids     WAVESCAN SCREENING  8/15/2012    Procedure: WAVESCAN SCREENING;  BILATERAL WAVESCAN SCREENING ;  Surgeon: Luis Johnson  MD;  Location: [facility] [place]  10/2006    eosphagitis,  nonpatent sphincter       FAMILY HISTORY:  Family History   Problem Relation Age of Onset     Diabetes Maternal Grandmother      Heart Disease Brother         hole in heart     Cerebrovascular Disease Maternal Grandfather      LANI.A.D. Maternal Grandfather      Cancer - colorectal No family hx of      Prostate Cancer No family hx of      Anesthesia Reaction No family hx of        SOCIAL HISTORY:  Social History     Socioeconomic History     Marital status: Single     Spouse name: None     Number of children: None     Years of education: None     Highest education level: None   Occupational History     None   Social Needs     Financial resource strain: None     Food insecurity:     Worry: None     Inability: None     Transportation needs:     Medical: None     Non-medical: None   Tobacco Use     Smoking status: Current Every Day Smoker     Packs/day: 0.50     Years: 10.00     Pack years: 5.00     Types: Cigarettes     Smokeless tobacco: Former User     Types: Chew     Quit date: 4/1/2015     Tobacco comment: 1/2 ppd as of 1/5/2011   Substance and Sexual Activity     Alcohol use: No     Drug use: No     Sexual activity: Yes     Partners: Female     Birth control/protection: Pill   Lifestyle     Physical activity:     Days per week: None     Minutes per session: None     Stress: None   Relationships     Social connections:     Talks on phone: None     Gets together: None     Attends [employer] service: None     Active member of club or organization: None     Attends meetings of clubs or organizations: None     Relationship status: None     Intimate partner violence:     Fear of current or ex partner: None     Emotionally abused: None     Physically abused: None     Forced sexual activity: None   Other Topics Concern     Parent/sibling w/ CABG, MI or angioplasty before 65F 55M? No   Social History Narrative     None       Review of Systems:  Skin:   "Negative       Eyes:  Negative      ENT:  Negative      Respiratory:  Negative       Cardiovascular:  Negative for;chest pain;dizziness;lightheadedness Positive for;palpitations;fatigue    Gastroenterology: Negative      Genitourinary:  Negative      Musculoskeletal:  Negative      Neurologic:  Negative      Psychiatric:  Negative      Heme/Lymph/Imm:  Negative      Endocrine:  Negative        Physical Exam:  Vitals: /79   Pulse 98   Ht 1.93 m (6' 4\")   Wt (!) 179.2 kg (395 lb)   BMI 48.08 kg/m      Constitutional:  cooperative, alert and oriented, well developed, well nourished, in no acute distress        Skin:  warm and dry to the touch, no apparent skin lesions or masses noted          Head:  normocephalic, no masses or lesions        Eyes:  pupils equal and round, conjunctivae and lids unremarkable, sclera white, no xanthalasma, EOMS intact, no nystagmus        Lymph:      ENT:  no pallor or cyanosis, dentition good        Neck:  carotid pulses are full and equal bilaterally, JVP normal, no carotid bruit        Respiratory:  normal breath sounds, clear to auscultation, normal A-P diameter, normal symmetry, normal respiratory excursion, no use of accessory muscles         Cardiac: regular rhythm occasional premature beats   no presence of murmur                                                   GI:  abdomen soft;BS normoactive        Extremities and Muscular Skeletal:  no deformities, clubbing, cyanosis, erythema observed;no edema              Neurological:  no gross motor deficits;affect appropriate        Psych:  oriented to time, person and place        CC  No referring provider defined for this encounter.              "

## 2020-02-13 NOTE — LETTER
2/13/2020    [facility]  [address] [address]    RE: Pop Radha       Dear Colleague,    I had the pleasure of seeing Pop Garcia in the [facility].    HPI and Plan:   See dictation    Orders Placed This Encounter   Procedures     Follow-Up with Cardiac Advanced Practice Provider     Exercise Stress Echocardiogram       No orders of the defined types were placed in this encounter.      There are no discontinued medications.      Encounter Diagnoses   Name Primary?     Palpitations Yes     Premature ventricular contractions      Tobacco use disorder      Chest pressure        CURRENT MEDICATIONS:  Current Outpatient Medications   Medication Sig Dispense Refill     amoxicillin-clavulanate (AUGMENTIN) 875-125 MG tablet Take 1 tablet by mouth 2 times daily for 14 days 28 tablet 0     IBUPROFEN PO        methocarbamol (ROBAXIN) 500 MG tablet Take 1-2 tablets (500-1,000 mg) by mouth 3 times daily as needed for muscle spasms 60 tablet 1     multivitamin w/minerals (MULTI-VITAMIN) tablet Take 1 tablet by mouth daily       metoprolol tartrate (LOPRESSOR) 25 MG tablet 1 tablet up to twice daily as needed for palpitations. (Patient not taking: Reported on 2/4/2020) 30 tablet 0       ALLERGIES   No Known Allergies    PAST MEDICAL HISTORY:  Past Medical History:   Diagnosis Date     Attention deficit disorder with hyperactivity(314.01)      Chronic depressive personality disorder     in past not currently.      Juvenile osteochondrosis of lower extremity, excluding foot     Osgood-Schlatters - currently not bothering much     Morbid obesity (H)      Oppositional defiant disorder of childhood or adolescence     as kid     TOBACCO ABUSE-CONTINUOUS        PAST SURGICAL HISTORY:  Past Surgical History:   Procedure Laterality Date     LASIK CUSTOMVUE BILATERAL  8/15/2012    Procedure: LASIK CUSTOMVUE BILATERAL;  BILATERAL LASIK CUSTOMVUE  WITH INTRALASE ;  Surgeon:  Luis Johnson MD;  Location: [facility]     SURGICAL HISTORY OF -   1991    tonsils & adenoids     WAVESCAN SCREENING  8/15/2012    Procedure: WAVESCAN SCREENING;  BILATERAL WAVESCAN SCREENING ;  Surgeon: Luis Johnson MD;  Location: [facility]     [facility] [place]  10/2006    eosphagitis,  nonpatent sphincter       FAMILY HISTORY:  Family History   Problem Relation Age of Onset     Diabetes Maternal Grandmother      Heart Disease Brother         hole in heart     Cerebrovascular Disease Maternal Grandfather      C.A.D. Maternal Grandfather      Cancer - colorectal No family hx of      Prostate Cancer No family hx of      Anesthesia Reaction No family hx of        SOCIAL HISTORY:  Social History     Socioeconomic History     Marital status: Single     Spouse name: None     Number of children: None     Years of education: None     Highest education level: None   Occupational History     None   Social Needs     Financial resource strain: None     Food insecurity:     Worry: None     Inability: None     Transportation needs:     Medical: None     Non-medical: None   Tobacco Use     Smoking status: Current Every Day Smoker     Packs/day: 0.50     Years: 10.00     Pack years: 5.00     Types: Cigarettes     Smokeless tobacco: Former User     Types: Chew     Quit date: 4/1/2015     Tobacco comment: 1/2 ppd as of 1/5/2011   Substance and Sexual Activity     Alcohol use: No     Drug use: No     Sexual activity: Yes     Partners: Female     Birth control/protection: Pill   Lifestyle     Physical activity:     Days per week: None     Minutes per session: None     Stress: None   Relationships     Social connections:     Talks on phone: None     Gets together: None     Attends [employer] service: None     Active member of club or organization: None     Attends meetings of clubs or organizations: None     Relationship status: None     Intimate partner violence:     Fear of current or ex partner: None     Emotionally abused: None     " Physically abused: None     Forced sexual activity: None   Other Topics Concern     Parent/sibling w/ CABG, MI or angioplasty before 65F 55M? No   Social History Narrative     None       Review of Systems:  Skin:  Negative       Eyes:  Negative      ENT:  Negative      Respiratory:  Negative       Cardiovascular:  Negative for;chest pain;dizziness;lightheadedness Positive for;palpitations;fatigue    Gastroenterology: Negative      Genitourinary:  Negative      Musculoskeletal:  Negative      Neurologic:  Negative      Psychiatric:  Negative      Heme/Lymph/Imm:  Negative      Endocrine:  Negative        Physical Exam:  Vitals: /79   Pulse 98   Ht 1.93 m (6' 4\")   Wt (!) 179.2 kg (395 lb)   BMI 48.08 kg/m       Constitutional:  cooperative, alert and oriented, well developed, well nourished, in no acute distress        Skin:  warm and dry to the touch, no apparent skin lesions or masses noted          Head:  normocephalic, no masses or lesions        Eyes:  pupils equal and round, conjunctivae and lids unremarkable, sclera white, no xanthalasma, EOMS intact, no nystagmus        Lymph:      ENT:  no pallor or cyanosis, dentition good        Neck:  carotid pulses are full and equal bilaterally, JVP normal, no carotid bruit        Respiratory:  normal breath sounds, clear to auscultation, normal A-P diameter, normal symmetry, normal respiratory excursion, no use of accessory muscles         Cardiac: regular rhythm occasional premature beats   no presence of murmur                                                   GI:  abdomen soft;BS normoactive        Extremities and Muscular Skeletal:  no deformities, clubbing, cyanosis, erythema observed;no edema              Neurological:  no gross motor deficits;affect appropriate        Psych:  oriented to time, person and place        CC  No referring provider defined for this encounter.                Thank you for allowing me to participate in the care of your " patient.      Sincerely,     STEVEN RAMAN MD     [facility]    cc:   No referring provider defined for this encounter.

## 2020-02-14 ENCOUNTER — OFFICE VISIT (OUTPATIENT)
Dept: FAMILY MEDICINE | Facility: CLINIC | Age: 35
End: 2020-02-14
Payer: COMMERCIAL

## 2020-02-14 VITALS
HEIGHT: 76 IN | HEART RATE: 82 BPM | TEMPERATURE: 96.7 F | DIASTOLIC BLOOD PRESSURE: 84 MMHG | SYSTOLIC BLOOD PRESSURE: 128 MMHG | BODY MASS INDEX: 38.36 KG/M2 | WEIGHT: 315 LBS | OXYGEN SATURATION: 96 % | RESPIRATION RATE: 16 BRPM

## 2020-02-14 DIAGNOSIS — F41.1 GAD (GENERALIZED ANXIETY DISORDER): Primary | ICD-10-CM

## 2020-02-14 PROCEDURE — 99213 OFFICE O/P EST LOW 20 MIN: CPT | Performed by: NURSE PRACTITIONER

## 2020-02-14 RX ORDER — HYDROXYZINE PAMOATE 25 MG/1
25 CAPSULE ORAL
Qty: 30 CAPSULE | Refills: 3 | Status: SHIPPED | OUTPATIENT
Start: 2020-02-14 | End: 2020-10-26

## 2020-02-14 RX ORDER — BUSPIRONE HYDROCHLORIDE 10 MG/1
10 TABLET ORAL 2 TIMES DAILY
Qty: 60 TABLET | Refills: 5 | Status: SHIPPED | OUTPATIENT
Start: 2020-02-14 | End: 2020-10-26

## 2020-02-14 ASSESSMENT — ANXIETY QUESTIONNAIRES
3. WORRYING TOO MUCH ABOUT DIFFERENT THINGS: MORE THAN HALF THE DAYS
5. BEING SO RESTLESS THAT IT IS HARD TO SIT STILL: MORE THAN HALF THE DAYS
GAD7 TOTAL SCORE: 13
1. FEELING NERVOUS, ANXIOUS, OR ON EDGE: NEARLY EVERY DAY
7. FEELING AFRAID AS IF SOMETHING AWFUL MIGHT HAPPEN: SEVERAL DAYS
IF YOU CHECKED OFF ANY PROBLEMS ON THIS QUESTIONNAIRE, HOW DIFFICULT HAVE THESE PROBLEMS MADE IT FOR YOU TO DO YOUR WORK, TAKE CARE OF THINGS AT HOME, OR GET ALONG WITH OTHER PEOPLE: SOMEWHAT DIFFICULT
2. NOT BEING ABLE TO STOP OR CONTROL WORRYING: MORE THAN HALF THE DAYS
6. BECOMING EASILY ANNOYED OR IRRITABLE: SEVERAL DAYS

## 2020-02-14 ASSESSMENT — PATIENT HEALTH QUESTIONNAIRE - PHQ9
SUM OF ALL RESPONSES TO PHQ QUESTIONS 1-9: 7
5. POOR APPETITE OR OVEREATING: MORE THAN HALF THE DAYS

## 2020-02-14 ASSESSMENT — MIFFLIN-ST. JEOR: SCORE: 2844.11

## 2020-02-14 NOTE — PROGRESS NOTES
"Subjective     Pop Garcia is a 34 year old male who presents to clinic today for the following health issues: anxiety due to stressful job.     Chief Complaint   Patient presents with     Anxiety     He saw Cardiology yesterday and they recommended that he should get on something for Anxiety       HPI   Anxiety Follow-Up    How are you doing with your anxiety since your last visit? He states his job is very stressful, he self medicated last year with marijuana and it was helpful but has stopped now.  When he gets anxiety his palpitations get worse. Diagnosed with PVC on 1/27.     Are you having other symptoms that might be associated with anxiety? Yes:  Job is very stressful     Have you had a significant life event? No     Are you feeling depressed? No    Do you have any concerns with your use of alcohol or other drugs? No    Social History     Tobacco Use     Smoking status: Current Every Day Smoker     Packs/day: 0.50     Years: 10.00     Pack years: 5.00     Types: Cigarettes     Smokeless tobacco: Former User     Types: Chew     Quit date: 4/1/2015     Tobacco comment: 1/2 ppd as of 1/5/2011   Substance Use Topics     Alcohol use: No     Drug use: No     SILVERIO-7 SCORE 2/14/2020   Total Score 13     PHQ 2/14/2020   PHQ-9 Total Score 7   Q9: Thoughts of better off dead/self-harm past 2 weeks Not at all       Reviewed and updated as needed this visit by Provider  Tobacco  Allergies  Meds  Problems  Med Hx  Surg Hx  Fam Hx         Review of Systems   ROS COMP: Constitutional, HEENT, cardiovascular, pulmonary, gi and gu systems are negative, except as otherwise noted.      Objective    /84 (BP Location: Right arm, Patient Position: Sitting, Cuff Size: Adult Large)   Pulse 82   Temp 96.7  F (35.9  C) (Tympanic)   Resp 16   Ht 1.93 m (6' 4\")   Wt (!) 179.8 kg (396 lb 4.8 oz)   SpO2 96%   BMI 48.24 kg/m    Body mass index is 48.24 kg/m .  Physical Exam   GENERAL: healthy, alert and no distress  CV: " regular rate and rhythm, normal S1 S2, no S3 or S4, no murmur, click or rub, no peripheral edema and peripheral pulses strong  PSYCH: mentation appears normal, affect normal/bright    Diagnostic Test Results:  Labs reviewed in Epic        Assessment & Plan     1. SILVERIO (generalized anxiety disorder)    - hydrOXYzine (VISTARIL) 25 MG capsule; Take 1 capsule (25 mg) by mouth nightly as needed for anxiety or other (insomnia)  Dispense: 30 capsule; Refill: 3  - busPIRone (BUSPAR) 10 MG tablet; Take 1 tablet (10 mg) by mouth 2 times daily  Dispense: 60 tablet; Refill: 5       See Patient Instructions    Return in about 4 weeks (around 3/13/2020), or if symptoms worsen or fail to improve.    KELSY Copeland [facility]

## 2020-02-14 NOTE — PATIENT INSTRUCTIONS
Buspar 10 mg twice daily shoulder help to decrease anxiety.    Try Vistaril 25 mg at bedtime as needed for anxiety and sleep

## 2020-02-15 ASSESSMENT — ANXIETY QUESTIONNAIRES: GAD7 TOTAL SCORE: 13

## 2020-02-19 PROBLEM — F41.1 GAD (GENERALIZED ANXIETY DISORDER): Status: ACTIVE | Noted: 2020-02-19

## 2020-02-21 ENCOUNTER — HOSPITAL ENCOUNTER (OUTPATIENT)
Dept: CARDIOLOGY | Facility: CLINIC | Age: 35
Discharge: HOME | End: 2020-02-21
Attending: INTERNAL MEDICINE | Admitting: INTERNAL MEDICINE
Payer: COMMERCIAL

## 2020-02-21 ENCOUNTER — CARE COORDINATION (OUTPATIENT)
Dept: CARDIOLOGY | Facility: CLINIC | Age: 35
End: 2020-02-21

## 2020-02-21 DIAGNOSIS — R07.89 CHEST PRESSURE: ICD-10-CM

## 2020-02-21 DIAGNOSIS — I49.3 PREMATURE VENTRICULAR CONTRACTIONS: ICD-10-CM

## 2020-02-21 PROCEDURE — 93350 STRESS TTE ONLY: CPT | Mod: 26 | Performed by: INTERNAL MEDICINE

## 2020-02-21 PROCEDURE — 93325 DOPPLER ECHO COLOR FLOW MAPG: CPT | Mod: TC

## 2020-02-21 PROCEDURE — 93325 DOPPLER ECHO COLOR FLOW MAPG: CPT | Mod: 26 | Performed by: INTERNAL MEDICINE

## 2020-02-21 PROCEDURE — 93321 DOPPLER ECHO F-UP/LMTD STD: CPT | Mod: 26 | Performed by: INTERNAL MEDICINE

## 2020-02-21 PROCEDURE — 93016 CV STRESS TEST SUPVJ ONLY: CPT | Performed by: INTERNAL MEDICINE

## 2020-02-21 PROCEDURE — 25500064 ZZH RX 255 OP 636: Performed by: INTERNAL MEDICINE

## 2020-02-21 PROCEDURE — 93018 CV STRESS TEST I&R ONLY: CPT | Performed by: INTERNAL MEDICINE

## 2020-02-21 RX ADMIN — HUMAN ALBUMIN MICROSPHERES AND PERFLUTREN 2 ML: 10; .22 INJECTION, SOLUTION INTRAVENOUS at 10:06

## 2020-02-21 NOTE — PROGRESS NOTES
Received FMLA forms faxed to us. Called pt to inquire on exact time off request. Pt reports he is requesting time off for his ED visit, and the day after as he stayed home. He also needs time off for his appointments including travel time to the appointments. Will review w/Dr. Santiago and then fax back to pt's employer. Crystal Blum RN on 2/21/2020 at 11:18 AM

## 2020-02-21 NOTE — PROGRESS NOTES
FMLA forms signed by Dr. Santiago, faxed to pt's HR Dept:    Brenda Ruiz fax# [phone].      Sent to [facility] to scan. Crystal Blum RN on 2/21/2020 at 4:44 PM

## 2020-03-02 ENCOUNTER — OFFICE VISIT (OUTPATIENT)
Dept: CARDIOLOGY | Facility: CLINIC | Age: 35
End: 2020-03-02
Payer: COMMERCIAL

## 2020-03-02 VITALS
SYSTOLIC BLOOD PRESSURE: 135 MMHG | DIASTOLIC BLOOD PRESSURE: 74 MMHG | BODY MASS INDEX: 48.32 KG/M2 | OXYGEN SATURATION: 96 % | WEIGHT: 315 LBS | HEART RATE: 97 BPM

## 2020-03-02 DIAGNOSIS — R00.2 PALPITATIONS: ICD-10-CM

## 2020-03-02 DIAGNOSIS — I49.3 PVC'S (PREMATURE VENTRICULAR CONTRACTIONS): Primary | ICD-10-CM

## 2020-03-02 PROCEDURE — 99214 OFFICE O/P EST MOD 30 MIN: CPT | Performed by: NURSE PRACTITIONER

## 2020-03-02 RX ORDER — DILTIAZEM HYDROCHLORIDE 120 MG/1
120 CAPSULE, EXTENDED RELEASE ORAL DAILY
Qty: 30 CAPSULE | Refills: 11 | Status: SHIPPED | OUTPATIENT
Start: 2020-03-02 | End: 2020-10-26

## 2020-03-02 NOTE — PATIENT INSTRUCTIONS
Medication Changes:  START diltiazem 120 mg daily     Recommendations:  1. Call if palpitations are getting worse   2. Continue to work on quitting nicotine and caffeine     Follow-up:  See Reva TOLEDO for cardiology follow up at [facility]: 1 month     Cardiology Scheduling~[phone]  Cardiology Clinic RNs~[phone] (Lluvia Jesus RN and Mandi Felipe RN)

## 2020-03-02 NOTE — PROGRESS NOTES
Cardiology Clinic Progress Note  Pop Garcia MRN# [id number]   YOB: 1985 Age: 34 year old      Primary Cardiologist:   Dr. Santiago          History of Presenting Illness:      Pop Garcia is a pleasant 34 year old patient with a past cardiac history significant for   1. Palpitations   2. Frequent PVCs   Past medical history significant for ongoing tobacco abuse.    In January 2020, while at work, he was experiencing frequent palpitations and developed chest pressure with heavy exertion such as climbing ladders and lifting.  The palpitations have been increasing over the prior several weeks.  He presented to the ED and found to have negative troponins and normal labs.  EKG showed sinus rhythm.  He was noted to have PVCs and they recommended Holter monitor.  Holter monitor showed sinus rhythm with frequent PVCs 13%.    Pt was last seen by Dr. Santiago in February 2020.  He continued with bothersome palpitations which were frequent.  However, the chest discomfort had improved but was still present.  He had cut out coffee and energy drinks but continued with the palpitations.  He also cut down on smoking to half pack per day.  He previously was started on Lopressor but did not feel well with this so self discontinued it due to lightheadedness.  Stress echocardiogram was recommended in order to observe his rhythm with exercise.  Would then consider evaluation for coronary disease with nuclear study or stress MRI.    Pt presents today for testing follow-up. Exercise stress echo 2/21/2020 showing no evidence of stress-induced ischemia with EF 55 to 60%, EKG with normal sinus rhythm and few PVCs at peak exercise, he did not experience any chest discomfort with exercise.  He was able to exercise for 8-1/2 minutes up to 88% of THR, 10 METs.  These results were reviewed with him today.    His chest discomfort has resolved but continues with stable palpitations.  He continues smoking 2 to 3 cigarettes/day and drinking  1 cup of caffeinated Coke per day.  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  He has plans to follow-up with his PCP to try different medication for anxiety and stress.  I also discussed with him today lifestyle modification for palpitations and for stress.  He is agreeable to trying diltiazem for the palpitations/PVCs. Patient reports no chest pain, shortness of breath, PND, orthopnea, presyncope, syncope, edema, heart racing.      Current Cardiac Medications   None                    Assessment and Plan:       Plan  START diltiazem 120 mg daily     Recommendations:  1. Call if palpitations are getting worse   2. Continue to work on quitting nicotine and caffeine     Follow-up:  See Reva TOLEDO for cardiology follow up at [facility]: 1 month       1. Palpitations     Holter monitor 2/2020 showing frequent PVCs 13%    Negative stress echo     Did not tolerate beta-blocker due to lightheadedness    Lifestyle modification    Start diltiazem    If not tolerating diltiazem or no improvement, consider EP consultation      2. PVCs    13% on holter 2020    Did not tolerate metoprolol d/t lightheadedness     Start diltiazem          Thank you for allowing me to participate in this delightful patient's care.      This note was completed in part using Dragon voice recognition software. Although reviewed after completion, some word and grammatical errors may occur.    Reva Talbot, KELSY CNP, APRN, CNP           Data:   All laboratory data reviewed        HPI and Plan:   See dictation    Orders Placed This Encounter   Procedures     Follow-Up with Cardiac Advanced Practice Provider       Orders Placed This Encounter   Medications     diltiazem ER (DILT-XR) 120 MG 24 hr capsule     Sig: Take 1 capsule (120 mg) by mouth daily     Dispense:  30 capsule     Refill:  11       Medications Discontinued During This Encounter   Medication Reason     oseltamivir (TAMIFLU) 75 MG  capsule Therapy completed     amoxicillin-clavulanate (AUGMENTIN) 875-125 MG tablet Therapy completed         Encounter Diagnoses   Name Primary?     PVC's (premature ventricular contractions) Yes     Palpitations        CURRENT MEDICATIONS:  Current Outpatient Medications   Medication Sig Dispense Refill     busPIRone (BUSPAR) 10 MG tablet Take 1 tablet (10 mg) by mouth 2 times daily 60 tablet 5     diltiazem ER (DILT-XR) 120 MG 24 hr capsule Take 1 capsule (120 mg) by mouth daily 30 capsule 11     hydrOXYzine (VISTARIL) 25 MG capsule Take 1 capsule (25 mg) by mouth nightly as needed for anxiety or other (insomnia) 30 capsule 3     IBUPROFEN PO        methocarbamol (ROBAXIN) 500 MG tablet Take 1-2 tablets (500-1,000 mg) by mouth 3 times daily as needed for muscle spasms 60 tablet 1     multivitamin w/minerals (MULTI-VITAMIN) tablet Take 1 tablet by mouth daily         ALLERGIES   No Known Allergies    PAST MEDICAL HISTORY:  Past Medical History:   Diagnosis Date     Attention deficit disorder with hyperactivity(314.01)      Chronic depressive personality disorder     in past not currently.      Juvenile osteochondrosis of lower extremity, excluding foot     Osgood-Schlatters - currently not bothering much     Morbid obesity (H)      Oppositional defiant disorder of childhood or adolescence     as kid     TOBACCO ABUSE-CONTINUOUS        PAST SURGICAL HISTORY:  Past Surgical History:   Procedure Laterality Date     LASIK CUSTOMVUE BILATERAL  8/15/2012    Procedure: LASIK CUSTOMVUE BILATERAL;  BILATERAL LASIK CUSTOMVUE  WITH INTRALASE ;  Surgeon: Luis Johnson MD;  Location: [facility]     SURGICAL HISTORY OF -   1991    tonsils & adenoids     WAVESCAN SCREENING  8/15/2012    Procedure: WAVESCAN SCREENING;  BILATERAL WAVESCAN SCREENING ;  Surgeon: Luis Johnson MD;  Location: [facility]     ZZ [place]COPY[place]  10/2006    eosphagitis,  nonpatent sphincter       FAMILY HISTORY:  Family History   Problem Relation Age of  Onset     Diabetes Maternal Grandmother      Heart Disease Brother         hole in heart     Cerebrovascular Disease Maternal Grandfather      HELDER Maternal Grandfather      Cancer - colorectal No family hx of      Prostate Cancer No family hx of      Anesthesia Reaction No family hx of        SOCIAL HISTORY:  Social History     Socioeconomic History     Marital status: Single     Spouse name: None     Number of children: None     Years of education: None     Highest education level: None   Occupational History     None   Social Needs     Financial resource strain: None     Food insecurity:     Worry: None     Inability: None     Transportation needs:     Medical: None     Non-medical: None   Tobacco Use     Smoking status: Current Every Day Smoker     Packs/day: 0.50     Years: 10.00     Pack years: 5.00     Types: Cigarettes     Smokeless tobacco: Former User     Types: Chew     Quit date: 4/1/2015     Tobacco comment: 1/2 ppd as of 03/02/20   Substance and Sexual Activity     Alcohol use: No     Drug use: No     Sexual activity: Yes     Partners: Female     Birth control/protection: Pill   Lifestyle     Physical activity:     Days per week: None     Minutes per session: None     Stress: None   Relationships     Social connections:     Talks on phone: None     Gets together: None     Attends [employer] service: None     Active member of club or organization: None     Attends meetings of clubs or organizations: None     Relationship status: None     Intimate partner violence:     Fear of current or ex partner: None     Emotionally abused: None     Physically abused: None     Forced sexual activity: None   Other Topics Concern     Parent/sibling w/ CABG, MI or angioplasty before 65F 55M? No   Social History Narrative     None       Review of Systems:  Skin:  Positive for rash     Eyes:  Negative      ENT:  Negative      Respiratory:  Positive for dyspnea on exertion;cough     Cardiovascular:    Positive  for;palpitations    Gastroenterology: Positive for heartburn;nausea    Genitourinary:  Negative      Musculoskeletal:  Negative      Neurologic:  Negative      Psychiatric:  Positive for anxiety;sleep disturbances    Heme/Lymph/Imm:  Negative      Endocrine:  Negative        Physical Exam:  Vitals: /74 (BP Location: Right arm, Patient Position: Sitting, Cuff Size: Adult Large)   Pulse 97   Wt (!) 180.1 kg (397 lb)   SpO2 96%   BMI 48.32 kg/m      Constitutional:  cooperative, alert and oriented, well developed, well nourished, in no acute distress obese      Skin:  warm and dry to the touch          Head:  normocephalic        Eyes:  sclera white        Lymph:      ENT:  no pallor or cyanosis        Neck:  no stiffness        Respiratory:  clear to auscultation;normal symmetry         Cardiac: regular rhythm;normal S1 and S2 occasional premature beats   no presence of murmur          pulses full and equal                                        GI:  abdomen soft        Extremities and Muscular Skeletal:  no edema              Neurological:  no gross motor deficits;affect appropriate        Psych:  oriented to time, person and place          Janes Santiago MD  [address]  [place] [address]

## 2020-03-02 NOTE — LETTER
3/2/2020    [facility]  [address] [address]    RE: Pop Garcia       Dear Colleague,    I had the pleasure of seeing Pop Garcia in the [facility].    Cardiology Clinic Progress Note  Pop Garcia MRN# [id number]   YOB: 1985 Age: 34 year old      Primary Cardiologist:   Dr. Santiago          History of Presenting Illness:      Pop Garcia is a pleasant 34 year old patient with a past cardiac history significant for   1. Palpitations   2. Frequent PVCs   Past medical history significant for ongoing tobacco abuse.    In January 2020, while at work, he was experiencing frequent palpitations and developed chest pressure with heavy exertion such as climbing ladders and lifting.  The palpitations have been increasing over the prior several weeks.  He presented to the ED and found to have negative troponins and normal labs.  EKG showed sinus rhythm.  He was noted to have PVCs and they recommended Holter monitor.  Holter monitor showed sinus rhythm with frequent PVCs 13%.    Pt was last seen by Dr. Santiago in February 2020.  He continued with bothersome palpitations which were frequent.  However, the chest discomfort had improved but was still present.  He had cut out coffee and energy drinks but continued with the palpitations.  He also cut down on smoking to half pack per day.  He previously was started on Lopressor but did not feel well with this so self discontinued it due to lightheadedness.  Stress echocardiogram was recommended in order to observe his rhythm with exercise.  Would then consider evaluation for coronary disease with nuclear study or stress MRI.    Pt presents today for testing follow-up. Exercise stress echo 2/21/2020 showing no evidence of stress-induced ischemia with EF 55 to 60%, EKG with normal sinus rhythm and few PVCs at peak exercise, he did not experience any chest discomfort with exercise.  He was able to exercise for 8-1/2  minutes up to 88% of THR, 10 METs.  These results were reviewed with him today.    His chest discomfort has resolved but continues with stable palpitations.  He continues smoking 2 to 3 cigarettes/day and drinking 1 cup of caffeinated Coke per day.  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  He has plans to follow-up with his PCP to try different medication for anxiety and stress.  I also discussed with him today lifestyle modification for palpitations and for stress.  He is agreeable to trying diltiazem for the palpitations/PVCs. Patient reports no chest pain, shortness of breath, PND, orthopnea, presyncope, syncope, edema, heart racing.      Current Cardiac Medications   None                    Assessment and Plan:       Plan  START diltiazem 120 mg daily     Recommendations:  1. Call if palpitations are getting worse   2. Continue to work on quitting nicotine and caffeine     Follow-up:  See Reva TOLEDO for cardiology follow up at [facility]: 1 month       1. Palpitations     Holter monitor 2/2020 showing frequent PVCs 13%    Negative stress echo     Did not tolerate beta-blocker due to lightheadedness    Lifestyle modification    Start diltiazem    If not tolerating diltiazem or no improvement, consider EP consultation      2. PVCs    13% on holter 2020    Did not tolerate metoprolol d/t lightheadedness     Start diltiazem          Thank you for allowing me to participate in this delightful patient's care.      This note was completed in part using Dragon voice recognition software. Although reviewed after completion, some word and grammatical errors may occur.    Reva Talbot, KELSY CNP, APRN, CNP           Data:   All laboratory data reviewed        HPI and Plan:   See dictation    Orders Placed This Encounter   Procedures     Follow-Up with Cardiac Advanced Practice Provider       Orders Placed This Encounter   Medications     diltiazem ER (DILT-XR) 120  MG 24 hr capsule     Sig: Take 1 capsule (120 mg) by mouth daily     Dispense:  30 capsule     Refill:  11       Medications Discontinued During This Encounter   Medication Reason     oseltamivir (TAMIFLU) 75 MG capsule Therapy completed     amoxicillin-clavulanate (AUGMENTIN) 875-125 MG tablet Therapy completed         Encounter Diagnoses   Name Primary?     PVC's (premature ventricular contractions) Yes     Palpitations        CURRENT MEDICATIONS:  Current Outpatient Medications   Medication Sig Dispense Refill     busPIRone (BUSPAR) 10 MG tablet Take 1 tablet (10 mg) by mouth 2 times daily 60 tablet 5     diltiazem ER (DILT-XR) 120 MG 24 hr capsule Take 1 capsule (120 mg) by mouth daily 30 capsule 11     hydrOXYzine (VISTARIL) 25 MG capsule Take 1 capsule (25 mg) by mouth nightly as needed for anxiety or other (insomnia) 30 capsule 3     IBUPROFEN PO        methocarbamol (ROBAXIN) 500 MG tablet Take 1-2 tablets (500-1,000 mg) by mouth 3 times daily as needed for muscle spasms 60 tablet 1     multivitamin w/minerals (MULTI-VITAMIN) tablet Take 1 tablet by mouth daily         ALLERGIES   No Known Allergies    PAST MEDICAL HISTORY:  Past Medical History:   Diagnosis Date     Attention deficit disorder with hyperactivity(314.01)      Chronic depressive personality disorder     in past not currently.      Juvenile osteochondrosis of lower extremity, excluding foot     Osgood-Schlatters - currently not bothering much     Morbid obesity (H)      Oppositional defiant disorder of childhood or adolescence     as kid     TOBACCO ABUSE-CONTINUOUS        PAST SURGICAL HISTORY:  Past Surgical History:   Procedure Laterality Date     LASIK CUSTOMVUE BILATERAL  8/15/2012    Procedure: LASIK CUSTOMVUE BILATERAL;  BILATERAL LASIK CUSTOMVUE  WITH INTRALASE ;  Surgeon: Luis Johnson MD;  Location: [facility]     SURGICAL HISTORY OF -   1991    tonsils & adenoids     WAVESCAN SCREENING  8/15/2012    Procedure: WAVESCAN SCREENING;   BILATERAL WAVESCAN SCREENING ;  Surgeon: Luis Johnson MD;  Location: [facility] [place]  10/2006    eosphagitis,  nonpatent sphincter       FAMILY HISTORY:  Family History   Problem Relation Age of Onset     Diabetes Maternal Grandmother      Heart Disease Brother         hole in heart     Cerebrovascular Disease Maternal Grandfather      C.A.D. Maternal Grandfather      Cancer - colorectal No family hx of      Prostate Cancer No family hx of      Anesthesia Reaction No family hx of        SOCIAL HISTORY:  Social History     Socioeconomic History     Marital status: Single     Spouse name: None     Number of children: None     Years of education: None     Highest education level: None   Occupational History     None   Social Needs     Financial resource strain: None     Food insecurity:     Worry: None     Inability: None     Transportation needs:     Medical: None     Non-medical: None   Tobacco Use     Smoking status: Current Every Day Smoker     Packs/day: 0.50     Years: 10.00     Pack years: 5.00     Types: Cigarettes     Smokeless tobacco: Former User     Types: Chew     Quit date: 4/1/2015     Tobacco comment: 1/2 ppd as of 03/02/20   Substance and Sexual Activity     Alcohol use: No     Drug use: No     Sexual activity: Yes     Partners: Female     Birth control/protection: Pill   Lifestyle     Physical activity:     Days per week: None     Minutes per session: None     Stress: None   Relationships     Social connections:     Talks on phone: None     Gets together: None     Attends [employer] service: None     Active member of club or organization: None     Attends meetings of clubs or organizations: None     Relationship status: None     Intimate partner violence:     Fear of current or ex partner: None     Emotionally abused: None     Physically abused: None     Forced sexual activity: None   Other Topics Concern     Parent/sibling w/ CABG, MI or angioplasty before 65F 55M? No   Social History  Narrative     None       Review of Systems:  Skin:  Positive for rash     Eyes:  Negative      ENT:  Negative      Respiratory:  Positive for dyspnea on exertion;cough     Cardiovascular:    Positive for;palpitations    Gastroenterology: Positive for heartburn;nausea    Genitourinary:  Negative      Musculoskeletal:  Negative      Neurologic:  Negative      Psychiatric:  Positive for anxiety;sleep disturbances    Heme/Lymph/Imm:  Negative      Endocrine:  Negative        Physical Exam:  Vitals: /74 (BP Location: Right arm, Patient Position: Sitting, Cuff Size: Adult Large)   Pulse 97   Wt (!) 180.1 kg (397 lb)   SpO2 96%   BMI 48.32 kg/m       Constitutional:  cooperative, alert and oriented, well developed, well nourished, in no acute distress obese      Skin:  warm and dry to the touch          Head:  normocephalic        Eyes:  sclera white        Lymph:      ENT:  no pallor or cyanosis        Neck:  no stiffness        Respiratory:  clear to auscultation;normal symmetry         Cardiac: regular rhythm;normal S1 and S2 occasional premature beats   no presence of murmur          pulses full and equal                                        GI:  abdomen soft        Extremities and Muscular Skeletal:  no edema              Neurological:  no gross motor deficits;affect appropriate        Psych:  oriented to time, person and place          Thank you for allowing me to participate in the care of your patient.    Sincerely,     KELSY [facility]

## 2020-03-07 NOTE — PROGRESS NOTES
"Subjective     Pop Garcia is a 34 year old male who presents to clinic today for the following health issues:  Chief Complaint   Patient presents with     Anxiety       HPI     Pop was seen on 2/14 for SILVERIO and started on buspirone and hydroxyzine.  Buspirone isn't helping.  The hydroxyzine makes his pretty groggy, \"miserable\".  He has taken Wellbutrin and many other meds for depression (Celexa, Prozac, and Zoloft sounds familiar when we go through a list) when he was a teenager, but he stopped them when he turned 19.  He felt these meds made him more depressed.  He says he was previously using marijuana for anxiety but stopped that a couple of years ago.  His mother suggested he ask for Xanax.     He was recently started on diltiazem for palpitations- hasn't noticed benefit or side effects from this yet.  He didn't tolerate metoprolol due to lightheadedness.      He continues to have some sinus pressure- the Augmentin did help but didn't clear his symptoms entirely.      Anxiety Follow-Up    How are you doing with your anxiety since your last visit? No change    Are you having other symptoms that might be associated with anxiety? No    Have you had a significant life event? Health Concerns - heart issue, PVCs    Are you feeling depressed? No    Do you have any concerns with your use of alcohol or other drugs? No    Social History     Tobacco Use     Smoking status: Current Every Day Smoker     Packs/day: 0.50     Years: 10.00     Pack years: 5.00     Types: Cigarettes     Smokeless tobacco: Former User     Types: Chew     Quit date: 4/1/2015     Tobacco comment: 1/2 ppd as of 03/02/20   Substance Use Topics     Alcohol use: Yes     Comment: weekends.      Drug use: No     SILVERIO-7 SCORE 2/14/2020 3/9/2020   Total Score 13 13     PHQ 2/14/2020 3/9/2020   PHQ-9 Total Score 7 3   Q9: Thoughts of better off dead/self-harm past 2 weeks Not at all Not at all         How many servings of fruits and vegetables do you eat " daily?  0-1    On average, how many sweetened beverages do you drink each day (Examples: soda, juice, sweet tea, etc.  Do NOT count diet or artificially sweetened beverages)?   4    How many days per week do you exercise enough to make your heart beat faster? 5 - mostly at work.     How many minutes a day do you exercise enough to make your heart beat faster? Varies,     How many days per week do you miss taking your medication? 0      Patient Active Problem List   Diagnosis     Tobacco use disorder     Morbid obesity (H)     Reflux esophagitis     Left axis deviation     CARDIOVASCULAR SCREENING; LDL GOAL LESS THAN 130     Vitamin D deficiency     Attention deficit hyperactivity disorder (ADHD)     Marijuana use, continuous     Chronic frontal sinusitis     Chronic rhinitis     PVC's (premature ventricular contractions)     SILVERIO (generalized anxiety disorder)     Past Surgical History:   Procedure Laterality Date     LASIK CUSTOMVUE BILATERAL  8/15/2012    Procedure: LASIK CUSTOMVUE BILATERAL;  BILATERAL LASIK CUSTOMVUE  WITH INTRALASE ;  Surgeon: Luis Johnson MD;  Location: [facility]     SURGICAL HISTORY OF -   1991    tonsils & adenoids     WAVESCAN SCREENING  8/15/2012    Procedure: WAVESCAN SCREENING;  BILATERAL WAVESCAN SCREENING ;  Surgeon: Luis Johnson MD;  Location: [facility]     ZZ GASTROSCOPY,FL  10/2006    eosphagitis,  nonpatent sphincter       Social History     Tobacco Use     Smoking status: Current Every Day Smoker     Packs/day: 0.50     Years: 10.00     Pack years: 5.00     Types: Cigarettes     Smokeless tobacco: Former User     Types: Chew     Quit date: 4/1/2015     Tobacco comment: 1/2 ppd as of 03/02/20   Substance Use Topics     Alcohol use: Yes     Comment: weekends.      Family History   Problem Relation Age of Onset     Diabetes Maternal Grandmother      Heart Disease Brother         hole in heart     Cerebrovascular Disease Maternal Grandfather      HELDER Maternal Grandfather       Cancer - colorectal No family hx of      Prostate Cancer No family hx of      Anesthesia Reaction No family hx of          Current Outpatient Medications   Medication Sig Dispense Refill     amoxicillin-clavulanate (AUGMENTIN) 875-125 MG tablet Take 1 tablet by mouth 2 times daily for 7 days 14 tablet 0     busPIRone (BUSPAR) 10 MG tablet Take 1 tablet (10 mg) by mouth 2 times daily 60 tablet 5     diltiazem ER (DILT-XR) 120 MG 24 hr capsule Take 1 capsule (120 mg) by mouth daily 30 capsule 11     DULoxetine (CYMBALTA) 60 MG capsule Take 1 capsule (60 mg) by mouth daily 30 capsule 2     fluticasone (FLONASE) 50 MCG/ACT nasal spray Spray 1 spray into both nostrils daily 16 g 11     hydrOXYzine (VISTARIL) 25 MG capsule Take 1 capsule (25 mg) by mouth nightly as needed for anxiety or other (insomnia) 30 capsule 3     LORazepam (ATIVAN) 0.5 MG tablet Take 1 tablet (0.5 mg) by mouth daily as needed for anxiety 20 tablet 0     multivitamin w/minerals (MULTI-VITAMIN) tablet Take 1 tablet by mouth daily       IBUPROFEN PO        methocarbamol (ROBAXIN) 500 MG tablet Take 1-2 tablets (500-1,000 mg) by mouth 3 times daily as needed for muscle spasms 60 tablet 1     No Known Allergies    Reviewed and updated as needed this visit by Provider         Review of Systems   ROS COMP: Constitutional, HEENT, psych systems are negative, except as otherwise noted.      Objective    /84 (BP Location: Left arm, Patient Position: Sitting, Cuff Size: Adult Large)   Pulse 99   Temp 97.4  F (36.3  C) (Tympanic)   Resp 18   Wt (!) 182.4 kg (402 lb 3.2 oz)   SpO2 96%   BMI 48.96 kg/m    Body mass index is 48.96 kg/m .  Physical Exam   GENERAL: healthy, alert and no distress  HENT: tenderness to percussion of left side of frontal sinus  PSYCH: mentation appears normal, affect flat and slightly anxious          Assessment & Plan     1. SILVERIO (generalized anxiety disorder)    Pop reports no improvement in anxiety with buspirone and side  "effects from hydroxyzine.  He would like Xanax for his anxiety.  Discussed with him that I do not like this medication due to fast onset and short half life and potential for addiction.  Discussed that benzos as a class can be addictive and are just a \"bandaid\" for symptoms rather than addressing the underlying problem and therefore are not recommended for frequent use.  Recommended daily medication.  He is hesitant to be on an serotonin specific reuptake inhibitor due to negative experiences as a teenager.  Discussed that he may respond to these differently as an adult, and he did agree to try SNRI - duloxetine.  Short course of lorazepam given to use for now and hopefully decrease usage as duloxetine takes effect.  He declined seeing a therapist at this time. Follow-up in 6 weeks.     - DULoxetine (CYMBALTA) 60 MG capsule; Take 1 capsule (60 mg) by mouth daily  Dispense: 30 capsule; Refill: 2  - LORazepam (ATIVAN) 0.5 MG tablet; Take 1 tablet (0.5 mg) by mouth daily as needed for anxiety  Dispense: 20 tablet; Refill: 0    2. Subacute frontal sinusitis    He reports some improvement but not resolution after the last course of Augmentin and would like a refill.  We will do this for another week, but if not further improving, I advised him this may not be bacterial.  Recommended Flonase to help with symptoms.     - amoxicillin-clavulanate (AUGMENTIN) 875-125 MG tablet; Take 1 tablet by mouth 2 times daily for 7 days  Dispense: 14 tablet; Refill: 0  - fluticasone (FLONASE) 50 MCG/ACT nasal spray; Spray 1 spray into both nostrils daily  Dispense: 16 g; Refill: 11       Return in about 6 weeks (around 4/20/2020) for Routine Visit.    Marvin Ramirez MD  [facility]    "

## 2020-03-09 ENCOUNTER — OFFICE VISIT (OUTPATIENT)
Dept: FAMILY MEDICINE | Facility: CLINIC | Age: 35
End: 2020-03-09
Payer: COMMERCIAL

## 2020-03-09 VITALS
WEIGHT: 315 LBS | OXYGEN SATURATION: 96 % | HEART RATE: 99 BPM | DIASTOLIC BLOOD PRESSURE: 84 MMHG | BODY MASS INDEX: 48.96 KG/M2 | SYSTOLIC BLOOD PRESSURE: 134 MMHG | TEMPERATURE: 97.4 F | RESPIRATION RATE: 18 BRPM

## 2020-03-09 DIAGNOSIS — F41.1 GAD (GENERALIZED ANXIETY DISORDER): Primary | ICD-10-CM

## 2020-03-09 DIAGNOSIS — J01.10 SUBACUTE FRONTAL SINUSITIS: ICD-10-CM

## 2020-03-09 PROCEDURE — 99214 OFFICE O/P EST MOD 30 MIN: CPT | Performed by: INTERNAL MEDICINE

## 2020-03-09 RX ORDER — DULOXETIN HYDROCHLORIDE 60 MG/1
60 CAPSULE, DELAYED RELEASE ORAL DAILY
Qty: 30 CAPSULE | Refills: 2 | Status: SHIPPED | OUTPATIENT
Start: 2020-03-09 | End: 2020-10-26

## 2020-03-09 RX ORDER — LORAZEPAM 0.5 MG/1
0.5 TABLET ORAL DAILY PRN
Qty: 20 TABLET | Refills: 0 | Status: SHIPPED | OUTPATIENT
Start: 2020-03-09 | End: 2020-10-26

## 2020-03-09 RX ORDER — FLUTICASONE PROPIONATE 50 MCG
1 SPRAY, SUSPENSION (ML) NASAL DAILY
Qty: 16 G | Refills: 11 | Status: SHIPPED | OUTPATIENT
Start: 2020-03-09 | End: 2020-10-26

## 2020-03-09 ASSESSMENT — ANXIETY QUESTIONNAIRES
IF YOU CHECKED OFF ANY PROBLEMS ON THIS QUESTIONNAIRE, HOW DIFFICULT HAVE THESE PROBLEMS MADE IT FOR YOU TO DO YOUR WORK, TAKE CARE OF THINGS AT HOME, OR GET ALONG WITH OTHER PEOPLE: SOMEWHAT DIFFICULT
3. WORRYING TOO MUCH ABOUT DIFFERENT THINGS: NEARLY EVERY DAY
5. BEING SO RESTLESS THAT IT IS HARD TO SIT STILL: SEVERAL DAYS
GAD7 TOTAL SCORE: 13
7. FEELING AFRAID AS IF SOMETHING AWFUL MIGHT HAPPEN: SEVERAL DAYS
6. BECOMING EASILY ANNOYED OR IRRITABLE: SEVERAL DAYS
1. FEELING NERVOUS, ANXIOUS, OR ON EDGE: NEARLY EVERY DAY
2. NOT BEING ABLE TO STOP OR CONTROL WORRYING: MORE THAN HALF THE DAYS

## 2020-03-09 ASSESSMENT — PATIENT HEALTH QUESTIONNAIRE - PHQ9
SUM OF ALL RESPONSES TO PHQ QUESTIONS 1-9: 3
5. POOR APPETITE OR OVEREATING: MORE THAN HALF THE DAYS

## 2020-03-09 NOTE — PATIENT INSTRUCTIONS
We'll try the duloxetine to control the anxiety on a daily basis.  Use the lorazepam sparingly for severe episode of anxiety- over the next few weeks as the duloxetine starts to work hopefully you'll feel your anxiety is better controlled and you no longer need to take this as often.      Try some Flonase to help your sinuses drain.  When spraying into the nose, aim towards the ears for the best effect.

## 2020-03-10 ASSESSMENT — ANXIETY QUESTIONNAIRES: GAD7 TOTAL SCORE: 13

## 2020-04-01 ENCOUNTER — CARE COORDINATION (OUTPATIENT)
Dept: CARDIOLOGY | Facility: CLINIC | Age: 35
End: 2020-04-01

## 2020-04-01 NOTE — PROGRESS NOTES
Called pt to change visit with Reva Rea NP on 4/2/20 to a phone visit due to the COVID 19 pandemic. Pt stated he would rather cancel visit tomorrow and call back in 2 months after things die down. Order extended for 2 months. Mandi Felipe RN Cardiology April 1, 2020, 11:45 AM

## 2020-09-13 NOTE — PROGRESS NOTES
Chief Complaint   Patient presents with     Sinus Problem     c/o sinus infection with symptoms of facial pressure and pain, PND, clearing throat, and headaches- been on several courses of antibiotics with minimal relief noted for 5 years now       History of Present Illness   Pop Garcia is a 34 year old male who presents for nose and sinus evaluation. The patient describes symptoms of nasal congestion and post nasal drainage for the past several years.  Presently, the patient is currently asymptomatic. These symptoms have recurred multiple times in the past year, usually after finishing antibiotics. The patient notes some mild allergies. The patient has not allergy testing.  Treatments have included antibiotics, nasal steroids, nasal saline irrigations, and oral antihistamines.  The treatments seem to help minimally.  From a symptom standpoint, the patient reports bilateral nasal obstruction, nasal congestion, rhinorrhea, postnasal drainage, altered sense of taste/smell, and face pain/pressure/fullness in their forehead.  No prior history of sinus surgery.  No previous nasal trauma.      Patient also reports significant history of heartburn.  He had a gastric ulcer in the past.  He has heartburn on a near daily basis.  He does usually take Tums with minimal benefit.  He is not tried a proton pump ever in the past.    Past Medical History  Patient Active Problem List   Diagnosis     Tobacco use disorder     Morbid obesity (H)     Reflux esophagitis     Left axis deviation     CARDIOVASCULAR SCREENING; LDL GOAL LESS THAN 130     Vitamin D deficiency     Attention deficit hyperactivity disorder (ADHD)     Marijuana use, continuous     Chronic frontal sinusitis     Chronic rhinitis     PVC's (premature ventricular contractions)     SILVERIO (generalized anxiety disorder)     Current Medications     Current Outpatient Medications:      budesonide (PULMICORT) 0.5 MG/2ML neb solution, Place 0.5 mg/2 mL budesonide vial in 8 oz  normal saline sinus rinse bottle.  Irrigate each nostril with one half of the bottle twice daily., Disp: 360 mL, Rfl: 3     omeprazole (PRILOSEC) 40 MG DR capsule, Take 1 capsule (40 mg) by mouth daily Take 20-30 minutes prior to morning meal, Disp: 90 capsule, Rfl: 1     busPIRone (BUSPAR) 10 MG tablet, Take 1 tablet (10 mg) by mouth 2 times daily, Disp: 60 tablet, Rfl: 5     diltiazem ER (DILT-XR) 120 MG 24 hr capsule, Take 1 capsule (120 mg) by mouth daily, Disp: 30 capsule, Rfl: 11     DULoxetine (CYMBALTA) 60 MG capsule, Take 1 capsule (60 mg) by mouth daily, Disp: 30 capsule, Rfl: 2     fluticasone (FLONASE) 50 MCG/ACT nasal spray, Spray 1 spray into both nostrils daily, Disp: 16 g, Rfl: 11     hydrOXYzine (VISTARIL) 25 MG capsule, Take 1 capsule (25 mg) by mouth nightly as needed for anxiety or other (insomnia), Disp: 30 capsule, Rfl: 3     IBUPROFEN PO, , Disp: , Rfl:      LORazepam (ATIVAN) 0.5 MG tablet, Take 1 tablet (0.5 mg) by mouth daily as needed for anxiety, Disp: 20 tablet, Rfl: 0     methocarbamol (ROBAXIN) 500 MG tablet, Take 1-2 tablets (500-1,000 mg) by mouth 3 times daily as needed for muscle spasms, Disp: 60 tablet, Rfl: 1     multivitamin w/minerals (MULTI-VITAMIN) tablet, Take 1 tablet by mouth daily, Disp: , Rfl:     Current Facility-Administered Medications:      triamcinolone (KENALOG-40) injection 80 mg, 80 mg, Intramuscular, Once, Fam Raphael MD    Allergies  No Known Allergies    Social History   Social History     Socioeconomic History     Marital status: Single     Spouse name: Not on file     Number of children: Not on file     Years of education: Not on file     Highest education level: Not on file   Occupational History     Not on file   Social Needs     Financial resource strain: Not on file     Food insecurity     Worry: Not on file     Inability: Not on file     Transportation needs     Medical: Not on file     Non-medical: Not on file   Tobacco Use     Smoking status:  "Current Every Day Smoker     Packs/day: 0.50     Years: 10.00     Pack years: 5.00     Types: Cigarettes     Smokeless tobacco: Former User     Types: Chew     Quit date: 4/1/2015     Tobacco comment: 1/2 ppd as of 03/02/20   Substance and Sexual Activity     Alcohol use: Yes     Comment: weekends.      Drug use: No     Sexual activity: Yes     Partners: Female     Birth control/protection: Pill   Lifestyle     Physical activity     Days per week: Not on file     Minutes per session: Not on file     Stress: Not on file   Relationships     Social connections     Talks on phone: Not on file     Gets together: Not on file     Attends Oriental orthodox service: Not on file     Active member of club or organization: Not on file     Attends meetings of clubs or organizations: Not on file     Relationship status: Not on file     Intimate partner violence     Fear of current or ex partner: Not on file     Emotionally abused: Not on file     Physically abused: Not on file     Forced sexual activity: Not on file   Other Topics Concern     Parent/sibling w/ CABG, MI or angioplasty before 65F 55M? No   Social History Narrative     Not on file       Family History  Family History   Problem Relation Age of Onset     Diabetes Maternal Grandmother      Heart Disease Brother         hole in heart     Cerebrovascular Disease Maternal Grandfather      C.A.D. Maternal Grandfather      Alcoholism Paternal Grandfather      Cancer - colorectal No family hx of      Prostate Cancer No family hx of      Anesthesia Reaction No family hx of        Review of Systems  As per HPI and PMHx, otherwise 10+ comprehensive system review is negative.    Physical Exam  /74 (BP Location: Right arm, Patient Position: Chair, Cuff Size: Adult Large)   Pulse 84   Temp 98.1  F (36.7  C) (Tympanic)   Ht 1.93 m (6' 4\")   Wt (!) 180.1 kg (397 lb)   BMI 48.32 kg/m    GENERAL: Patient is a pleasant, cooperative 34 year old male in no acute distress.  HEAD: " Normocephalic, atraumatic.  Hair and scalp are normal.  EYES: Pupils are equal, round, reactive to light and accommodation.  Extraocular movements are intact.  The sclera nonicteric without injection.  The extraocular structures are normal.  EARS: Normal shape and symmetry.  No tenderness when palpating the mastoid or tragal areas bilaterally.  Otoscopic exam reveals a minimal amount of cerumen bilaterally.  The bilateral tympanic membranes are round, intact without evidence of effusion, good landmarks.  No retraction, granulation, or drainage.  NOSE: Nares are patent.  Nasal mucosa is boggy and inflamed with sticky, inflammatory mucus.  The patient has severe/marked inferior turbinate hypertrophy bilaterally.  NEUROLOGIC: Cranial nerves II through XII are grossly intact.  Voice is strong.  Patient is House-Brackmann I/VI bilaterally.  CARDIOVASCULAR: Extremities are warm and well-perfused.  No significant peripheral edema.  RESPIRATORY: Patient has nonlabored breathing without cough, wheeze, stridor.  PSYCHIATRIC: Patient is alert and oriented.  Mood and affect appear normal.  SKIN: Warm and dry.  No scalp, face, or neck lesions noted.    Procedure: Flexible Nasal Endoscopy  Indication: Nasal congestion, postnasal drainage, chronic sinus symptoms    To best visualize the sinonasal anatomy and due to the chief complaint and HPI, I proceeded with flexible fiberoptic nasal endoscopy.  The bilateral nasal cavities were anesthetized and decongested with a mixture of lidocaine and neosynephrine.  The bilateral nasal cavities were then examined using flexible fiberoptic nasal endoscope.  The right nasal cavity shows grade 2 polyposis and mucoid drainage.  The left nasal cavity shows grade 2 polyposis and mucoid drainage.  The nasopharynx had a normal appearance with normal Eustachian tube openings and fossa of Rosenmuller bilaterally.  Minimal adenoid tissue.  The scope was removed.  The patient tolerated the procedure  well.    Assessment and Plan     ICD-10-CM    1. Chronic pansinusitis  J32.4 NASAL ENDOSCOPY, DIAGNOSTIC     budesonide (PULMICORT) 0.5 MG/2ML neb solution     triamcinolone (KENALOG-40) injection 80 mg   2. Nasal polyposis  J33.9 NASAL ENDOSCOPY, DIAGNOSTIC     budesonide (PULMICORT) 0.5 MG/2ML neb solution     triamcinolone (KENALOG-40) injection 80 mg   3. Gastroesophageal reflux disease, esophagitis presence not specified  K21.9 omeprazole (PRILOSEC) 40 MG DR capsule     It was my pleasure seeing Pop Garcia today in clinic.  The patient presents with chronic sinusitis.  He has nasal polyposis on examination. We discussed the pathophysiology of chronic sinusitis with nasal polyposis.  We discussed medical and surgical management.  I feel the patient would benefit from nasal saline irrigations with Budesonide.  We discussed proper nasal saline irrigation technique with Budesonide.  The patient would also benefit from an 80 mg intramuscular injection of Kenalog injection today in office.  We discussed the risks, benefits, options, goals of a intramuscular Kenalog injection today in office including, but not limited to: Risk of pain at injection site, risk of infection, side effects of systemic steroids including blood pressure problems, insulin resistance, weight gain, bone/joint issues, mood alteration.  Patient voiced understanding and is willing to proceed.    We will hold off on allergy testing at this time, but would consider in the future if they do not improve with medical management.      The patient will return in 6 weeks for follow-up.  I will contact the patient 1-2 weeks prior to the return appointment to recheck their symptoms.  If symptoms are not improved, we will obtain a sinus CT and review the imaging at the return appointment.    From a heartburn standpoint, I will place the patient on 40 mg of Prilosec once daily 20-30 minutes prior to morning meal.  We discussed not eating or drinking anything  about 3 hours prior to bedtime.  I provided him with a list of foods to avoid.  We can recheck his reflux symptoms and he returns to see me for sinus follow-up.    Fam Raphael MD  Department of Otolarygology-Head and Neck Surgery  [facility]

## 2020-09-14 ENCOUNTER — OFFICE VISIT (OUTPATIENT)
Dept: OTOLARYNGOLOGY | Facility: CLINIC | Age: 35
End: 2020-09-14
Payer: COMMERCIAL

## 2020-09-14 VITALS
HEART RATE: 84 BPM | TEMPERATURE: 98.1 F | HEIGHT: 76 IN | SYSTOLIC BLOOD PRESSURE: 139 MMHG | WEIGHT: 315 LBS | BODY MASS INDEX: 38.36 KG/M2 | DIASTOLIC BLOOD PRESSURE: 74 MMHG

## 2020-09-14 DIAGNOSIS — J33.9 NASAL POLYPOSIS: ICD-10-CM

## 2020-09-14 DIAGNOSIS — K21.9 GASTROESOPHAGEAL REFLUX DISEASE, ESOPHAGITIS PRESENCE NOT SPECIFIED: ICD-10-CM

## 2020-09-14 DIAGNOSIS — J32.4 CHRONIC PANSINUSITIS: Primary | ICD-10-CM

## 2020-09-14 PROCEDURE — 31231 NASAL ENDOSCOPY DX: CPT | Performed by: OTOLARYNGOLOGY

## 2020-09-14 PROCEDURE — 99204 OFFICE O/P NEW MOD 45 MIN: CPT | Mod: 25 | Performed by: OTOLARYNGOLOGY

## 2020-09-14 RX ORDER — BUDESONIDE 0.5 MG/2ML
INHALANT ORAL
Qty: 360 ML | Refills: 3 | Status: SHIPPED | OUTPATIENT
Start: 2020-09-14 | End: 2021-05-24

## 2020-09-14 RX ORDER — OMEPRAZOLE 40 MG/1
40 CAPSULE, DELAYED RELEASE ORAL DAILY
Qty: 90 CAPSULE | Refills: 1 | Status: SHIPPED | OUTPATIENT
Start: 2020-09-14 | End: 2021-05-24

## 2020-09-14 RX ORDER — TRIAMCINOLONE ACETONIDE 40 MG/ML
80 INJECTION, SUSPENSION INTRA-ARTICULAR; INTRAMUSCULAR ONCE
Status: COMPLETED | OUTPATIENT
Start: 2020-09-14 | End: 2020-09-14

## 2020-09-14 RX ADMIN — TRIAMCINOLONE ACETONIDE 80 MG: 40 INJECTION, SUSPENSION INTRA-ARTICULAR; INTRAMUSCULAR at 12:21

## 2020-09-14 ASSESSMENT — MIFFLIN-ST. JEOR: SCORE: 2847.28

## 2020-09-14 ASSESSMENT — PAIN SCALES - GENERAL: PAINLEVEL: NO PAIN (0)

## 2020-09-14 NOTE — PATIENT INSTRUCTIONS
Per physician's instructions    BUDESONIDE IRRIGATION INSTRUCTIONS    You will be starting Budesonide nasal irrigations and will need to obtain the following:      - NeilMed Sinus Rinse 8 oz Kit  - Distilled or filtered water   - Normal saline salt packets  - Budesonide medication     Place filtered or distilled water into the NeilMed bottle up to the fill line (DO NOT USE TAP OR WELL WATER).  Place the pre-made salt packet in the 8 oz of saline.  Then placed the budesonide medication into the bottle.  Shake the bottle to suspend into solution.  Lean head forward over a sink or a basin.  Rinse each side of the nose with one-half of the bottle (each squeeze is about one-half of the bottle). Rinse the nose twice daily.     You will follow up with your ENT surgeon in 6 weeks to recheck your symptoms.  If you are having problems with your irrigations or problems obtaining the medication, please contact your ENT surgeon.    Video example: [email or web address]

## 2020-09-14 NOTE — NURSING NOTE
"Initial /74 (BP Location: Right arm, Patient Position: Chair, Cuff Size: Adult Large)   Pulse 84   Temp 98.1  F (36.7  C) (Tympanic)   Ht 1.93 m (6' 4\")   Wt (!) 180.1 kg (397 lb)   BMI 48.32 kg/m   Estimated body mass index is 48.32 kg/m  as calculated from the following:    Height as of this encounter: 1.93 m (6' 4\").    Weight as of this encounter: 180.1 kg (397 lb). .    Gina Fernandes LPN    "

## 2020-09-14 NOTE — LETTER
9/14/2020         RE: Pop Garcia  [address] [facility] [address]        Dear Colleague,    Thank you for referring your patient, Pop Garcia, to the [facility]. Please see a copy of my visit note below.    Chief Complaint   Patient presents with     Sinus Problem     c/o sinus infection with symptoms of facial pressure and pain, PND, clearing throat, and headaches- been on several courses of antibiotics with minimal relief noted for 5 years now       History of Present Illness   Pop Garcia is a 34 year old male who presents for nose and sinus evaluation. The patient describes symptoms of nasal congestion and post nasal drainage for the past several years.  Presently, the patient is currently asymptomatic. These symptoms have recurred multiple times in the past year, usually after finishing antibiotics. The patient notes some mild allergies. The patient has not allergy testing.  Treatments have included antibiotics, nasal steroids, nasal saline irrigations, and oral antihistamines.  The treatments seem to help minimally.  From a symptom standpoint, the patient reports bilateral nasal obstruction, nasal congestion, rhinorrhea, postnasal drainage, altered sense of taste/smell, and face pain/pressure/fullness in their forehead.  No prior history of sinus surgery.  No previous nasal trauma.      Patient also reports significant history of heartburn.  He had a gastric ulcer in the past.  He has heartburn on a near daily basis.  He does usually take Tums with minimal benefit.  He is not tried a proton pump ever in the past.    Past Medical History  Patient Active Problem List   Diagnosis     Tobacco use disorder     Morbid obesity (H)     Reflux esophagitis     Left axis deviation     CARDIOVASCULAR SCREENING; LDL GOAL LESS THAN 130     Vitamin D deficiency     Attention deficit hyperactivity disorder (ADHD)     Marijuana use, continuous     Chronic frontal sinusitis     Chronic rhinitis     PVC's  (premature ventricular contractions)     SILVERIO (generalized anxiety disorder)     Current Medications     Current Outpatient Medications:      budesonide (PULMICORT) 0.5 MG/2ML neb solution, Place 0.5 mg/2 mL budesonide vial in 8 oz normal saline sinus rinse bottle.  Irrigate each nostril with one half of the bottle twice daily., Disp: 360 mL, Rfl: 3     omeprazole (PRILOSEC) 40 MG DR capsule, Take 1 capsule (40 mg) by mouth daily Take 20-30 minutes prior to morning meal, Disp: 90 capsule, Rfl: 1     busPIRone (BUSPAR) 10 MG tablet, Take 1 tablet (10 mg) by mouth 2 times daily, Disp: 60 tablet, Rfl: 5     diltiazem ER (DILT-XR) 120 MG 24 hr capsule, Take 1 capsule (120 mg) by mouth daily, Disp: 30 capsule, Rfl: 11     DULoxetine (CYMBALTA) 60 MG capsule, Take 1 capsule (60 mg) by mouth daily, Disp: 30 capsule, Rfl: 2     fluticasone (FLONASE) 50 MCG/ACT nasal spray, Spray 1 spray into both nostrils daily, Disp: 16 g, Rfl: 11     hydrOXYzine (VISTARIL) 25 MG capsule, Take 1 capsule (25 mg) by mouth nightly as needed for anxiety or other (insomnia), Disp: 30 capsule, Rfl: 3     IBUPROFEN PO, , Disp: , Rfl:      LORazepam (ATIVAN) 0.5 MG tablet, Take 1 tablet (0.5 mg) by mouth daily as needed for anxiety, Disp: 20 tablet, Rfl: 0     methocarbamol (ROBAXIN) 500 MG tablet, Take 1-2 tablets (500-1,000 mg) by mouth 3 times daily as needed for muscle spasms, Disp: 60 tablet, Rfl: 1     multivitamin w/minerals (MULTI-VITAMIN) tablet, Take 1 tablet by mouth daily, Disp: , Rfl:     Current Facility-Administered Medications:      triamcinolone (KENALOG-40) injection 80 mg, 80 mg, Intramuscular, Once, Fam Raphael MD    Allergies  No Known Allergies    Social History   Social History     Socioeconomic History     Marital status: Single     Spouse name: Not on file     Number of children: Not on file     Years of education: Not on file     Highest education level: Not on file   Occupational History     Not on file   Social Needs      Financial resource strain: Not on file     Food insecurity     Worry: Not on file     Inability: Not on file     Transportation needs     Medical: Not on file     Non-medical: Not on file   Tobacco Use     Smoking status: Current Every Day Smoker     Packs/day: 0.50     Years: 10.00     Pack years: 5.00     Types: Cigarettes     Smokeless tobacco: Former User     Types: Chew     Quit date: 4/1/2015     Tobacco comment: 1/2 ppd as of 03/02/20   Substance and Sexual Activity     Alcohol use: Yes     Comment: weekends.      Drug use: No     Sexual activity: Yes     Partners: Female     Birth control/protection: Pill   Lifestyle     Physical activity     Days per week: Not on file     Minutes per session: Not on file     Stress: Not on file   Relationships     Social connections     Talks on phone: Not on file     Gets together: Not on file     Attends [employer] service: Not on file     Active member of club or organization: Not on file     Attends meetings of clubs or organizations: Not on file     Relationship status: Not on file     Intimate partner violence     Fear of current or ex partner: Not on file     Emotionally abused: Not on file     Physically abused: Not on file     Forced sexual activity: Not on file   Other Topics Concern     Parent/sibling w/ CABG, MI or angioplasty before 65F 55M? No   Social History Narrative     Not on file       Family History  Family History   Problem Relation Age of Onset     Diabetes Maternal Grandmother      Heart Disease Brother         hole in heart     Cerebrovascular Disease Maternal Grandfather      C.A.D. Maternal Grandfather      Alcoholism Paternal Grandfather      Cancer - colorectal No family hx of      Prostate Cancer No family hx of      Anesthesia Reaction No family hx of        Review of Systems  As per HPI and PMHx, otherwise 10+ comprehensive system review is negative.    Physical Exam  /74 (BP Location: Right arm, Patient Position: Chair, Cuff Size:  "Adult Large)   Pulse 84   Temp 98.1  F (36.7  C) (Tympanic)   Ht 1.93 m (6' 4\")   Wt (!) 180.1 kg (397 lb)   BMI 48.32 kg/m    GENERAL: Patient is a pleasant, cooperative 34 year old male in no acute distress.  HEAD: Normocephalic, atraumatic.  Hair and scalp are normal.  EYES: Pupils are equal, round, reactive to light and accommodation.  Extraocular movements are intact.  The sclera nonicteric without injection.  The extraocular structures are normal.  EARS: Normal shape and symmetry.  No tenderness when palpating the mastoid or tragal areas bilaterally.  Otoscopic exam reveals a minimal amount of cerumen bilaterally.  The bilateral tympanic membranes are round, intact without evidence of effusion, good landmarks.  No retraction, granulation, or drainage.  NOSE: Nares are patent.  Nasal mucosa is boggy and inflamed with sticky, inflammatory mucus.  The patient has severe/marked inferior turbinate hypertrophy bilaterally.  NEUROLOGIC: Cranial nerves II through XII are grossly intact.  Voice is strong.  Patient is House-Brackmann I/VI bilaterally.  CARDIOVASCULAR: Extremities are warm and well-perfused.  No significant peripheral edema.  RESPIRATORY: Patient has nonlabored breathing without cough, wheeze, stridor.  PSYCHIATRIC: Patient is alert and oriented.  Mood and affect appear normal.  SKIN: Warm and dry.  No scalp, face, or neck lesions noted.    Procedure: Flexible Nasal Endoscopy  Indication: Nasal congestion, postnasal drainage, chronic sinus symptoms    To best visualize the sinonasal anatomy and due to the chief complaint and HPI, I proceeded with flexible fiberoptic nasal endoscopy.  The bilateral nasal cavities were anesthetized and decongested with a mixture of lidocaine and neosynephrine.  The bilateral nasal cavities were then examined using flexible fiberoptic nasal endoscope.  The right nasal cavity shows grade 2 polyposis and mucoid drainage.  The left nasal cavity shows grade 2 polyposis and " mucoid drainage.  The nasopharynx had a normal appearance with normal Eustachian tube openings and fossa of Rosenmuller bilaterally.  Minimal adenoid tissue.  The scope was removed.  The patient tolerated the procedure well.    Assessment and Plan     ICD-10-CM    1. Chronic pansinusitis  J32.4 NASAL ENDOSCOPY, DIAGNOSTIC     budesonide (PULMICORT) 0.5 MG/2ML neb solution     triamcinolone (KENALOG-40) injection 80 mg   2. Nasal polyposis  J33.9 NASAL ENDOSCOPY, DIAGNOSTIC     budesonide (PULMICORT) 0.5 MG/2ML neb solution     triamcinolone (KENALOG-40) injection 80 mg   3. Gastroesophageal reflux disease, esophagitis presence not specified  K21.9 omeprazole (PRILOSEC) 40 MG DR capsule     It was my pleasure seeing Pop Garcia today in clinic.  The patient presents with chronic sinusitis.  He has nasal polyposis on examination. We discussed the pathophysiology of chronic sinusitis with nasal polyposis.  We discussed medical and surgical management.  I feel the patient would benefit from nasal saline irrigations with Budesonide.  We discussed proper nasal saline irrigation technique with Budesonide.  The patient would also benefit from an 80 mg intramuscular injection of Kenalog injection today in office.  We discussed the risks, benefits, options, goals of a intramuscular Kenalog injection today in office including, but not limited to: Risk of pain at injection site, risk of infection, side effects of systemic steroids including blood pressure problems, insulin resistance, weight gain, bone/joint issues, mood alteration.  Patient voiced understanding and is willing to proceed.    We will hold off on allergy testing at this time, but would consider in the future if they do not improve with medical management.      The patient will return in 6 weeks for follow-up.  I will contact the patient 1-2 weeks prior to the return appointment to recheck their symptoms.  If symptoms are not improved, we will obtain a sinus CT and  review the imaging at the return appointment.    From a heartburn standpoint, I will place the patient on 40 mg of Prilosec once daily 20-30 minutes prior to morning meal.  We discussed not eating or drinking anything about 3 hours prior to bedtime.  I provided him with a list of foods to avoid.  We can recheck his reflux symptoms and he returns to see me for sinus follow-up.    Fam Raphael MD  Department of Otolarygology-Head and Neck Surgery  [facility]       Again, thank you for allowing me to participate in the care of your patient.        Sincerely,        Fam Raphael MD

## 2020-09-14 NOTE — NURSING NOTE
This laryngoscopy scope was used on this patient.    Flexible    Akash Storz Flexible #[id number] Adult

## 2020-10-20 ENCOUNTER — TELEPHONE (OUTPATIENT)
Dept: OTOLARYNGOLOGY | Facility: CLINIC | Age: 35
End: 2020-10-20

## 2020-10-20 DIAGNOSIS — J33.9 NASAL POLYPOSIS: ICD-10-CM

## 2020-10-20 DIAGNOSIS — J32.4 CHRONIC PANSINUSITIS: Primary | ICD-10-CM

## 2020-10-20 NOTE — TELEPHONE ENCOUNTER
----- Message from Fam Raphael MD sent at 10/19/2020  8:50 AM CDT -----  Regarding: Sinus symptoms, possible CT  The patient has an appoint with me for follow-up on 10/26.    Could you call him and see how his nose and sinus symptoms are doing after the Kenalog injection and the irrigations with budesonide?  If he is doing significantly better, we will see him for regular follow-up and go from there.  If he still struggling significantly, I recommend a CT scan of his sinuses.  We could try to get the sinus CT prior to his appointment so we can review it together in office on 10/26/2020.    Thanks,    IJL

## 2020-10-20 NOTE — TELEPHONE ENCOUNTER
Pt notes no help.  Wants CT.  Please Order.  He will call and try to arrange prior to appt.  Kaitlynn CASTILLO   [facility]

## 2020-10-23 NOTE — PROGRESS NOTES
Chief Complaint   Patient presents with     Follow Up     he used the rinses for about 10 days and no change in symptoms - c/o nasal congestion      History of Present Illness  Pop Garcia is a 35 year old male who presents for nose and sinus follow-up.  I evaluated patient on 9/14/2020.  He was found to have nasal polyposis on examination.  I started the patient on budesonide irrigations following Kenalog injection.  We contacted the patient, and he was not noticing symptomatic improvement with medical management.  I ordered a CT scan of his sinuses performed earlier today.      My review of the patient's sinus CT performed 10/26/2020 shows severe persistent bilateral sinus disease with complete opacification of the bilateral frontal sinuses maxillary sinuses.  There is near complete opacification of the bilateral ethmoid systems right greater than left.  There is sinus mucosal thickening without air-fluid level in the bilateral sphenoid sinuses.  There is a slight rightward anterior-superior nasal septal deviation.  There is obvious nasal polyposis bilaterally.    Past Medical History  Patient Active Problem List   Diagnosis     Tobacco use disorder     Morbid obesity (H)     Reflux esophagitis     Left axis deviation     CARDIOVASCULAR SCREENING; LDL GOAL LESS THAN 130     Vitamin D deficiency     Attention deficit hyperactivity disorder (ADHD)     Marijuana use, continuous     Chronic frontal sinusitis     Chronic rhinitis     PVC's (premature ventricular contractions)     SILVERIO (generalized anxiety disorder)     Current Medications    Current Outpatient Medications:      multivitamin w/minerals (MULTI-VITAMIN) tablet, Take 1 tablet by mouth daily, Disp: , Rfl:      budesonide (PULMICORT) 0.5 MG/2ML neb solution, Place 0.5 mg/2 mL budesonide vial in 8 oz normal saline sinus rinse bottle.  Irrigate each nostril with one half of the bottle twice daily. (Patient not taking: Reported on 10/26/2020), Disp: 360 mL, Rfl:  3     IBUPROFEN PO, , Disp: , Rfl:      omeprazole (PRILOSEC) 40 MG DR capsule, Take 1 capsule (40 mg) by mouth daily Take 20-30 minutes prior to morning meal (Patient not taking: Reported on 10/26/2020), Disp: 90 capsule, Rfl: 1    Allergies  No Known Allergies    Social History  Social History     Socioeconomic History     Marital status: Single     Spouse name: Not on file     Number of children: Not on file     Years of education: Not on file     Highest education level: Not on file   Occupational History     Not on file   Social Needs     Financial resource strain: Not on file     Food insecurity     Worry: Not on file     Inability: Not on file     Transportation needs     Medical: Not on file     Non-medical: Not on file   Tobacco Use     Smoking status: Current Every Day Smoker     Packs/day: 0.50     Years: 10.00     Pack years: 5.00     Types: Cigarettes     Smokeless tobacco: Former User     Types: Chew   Substance and Sexual Activity     Alcohol use: Yes     Comment: weekends.      Drug use: No     Sexual activity: Yes     Partners: Female     Birth control/protection: Pill   Lifestyle     Physical activity     Days per week: Not on file     Minutes per session: Not on file     Stress: Not on file   Relationships     Social connections     Talks on phone: Not on file     Gets together: Not on file     Attends Judaism service: Not on file     Active member of club or organization: Not on file     Attends meetings of clubs or organizations: Not on file     Relationship status: Not on file     Intimate partner violence     Fear of current or ex partner: Not on file     Emotionally abused: Not on file     Physically abused: Not on file     Forced sexual activity: Not on file   Other Topics Concern     Parent/sibling w/ CABG, MI or angioplasty before 65F 55M? No   Social History Narrative     Not on file       Family History  Family History   Problem Relation Age of Onset     Diabetes Maternal Grandmother       Heart Disease Brother         hole in heart     Cerebrovascular Disease Maternal Grandfather      FRANKIEASHENA. Maternal Grandfather      Alcoholism Paternal Grandfather      Cancer - colorectal No family hx of      Prostate Cancer No family hx of      Anesthesia Reaction No family hx of        Review of Systems  As per HPI and PMHx, otherwise 10 system review including the head and neck, constitutional, eyes, respiratory, GI, skin, neurologic, lymphatic, endocrine, and allergy systems is negative.    Physical Exam  BP (!) 148/86 (BP Location: Right arm, Patient Position: Chair, Cuff Size: Adult Large)   Pulse 88   Temp 97.4  F (36.3  C) (Tympanic)   Wt (!) 180.1 kg (397 lb)   BMI 48.32 kg/m    GENERAL: Patient is a pleasant, cooperative 35 year old male in no acute distress.  HEAD: Normocephalic, atraumatic.  Hair and scalp are normal.  EYES: Pupils are equal, round, reactive to light and accommodation.  Extraocular movements are intact.  The sclera nonicteric without injection.  The extraocular structures are normal.  EARS: Normal shape and symmetry.  No tenderness when palpating the mastoid or tragal areas bilaterally.    NOSE: Nares are patent.  Nasal mucosa is boggy and inflamed with sticky, inflammatory mucus.  The patient has severe/marked inferior turbinate hypertrophy bilaterally.  NEUROLOGIC: Cranial nerves II through XII are grossly intact.  Voice is strong.  Patient is House-Brackmann I/VI bilaterally.  CARDIOVASCULAR: Extremities are warm and well-perfused.  No significant peripheral edema.  RESPIRATORY: Patient has nonlabored breathing without cough, wheeze, stridor.  PSYCHIATRIC: Patient is alert and oriented.  Mood and affect appear normal.  SKIN: Warm and dry.  No scalp, face, or neck lesions noted.    Assessment and Plan     ICD-10-CM    1. Chronic pansinusitis  J32.4    2. Nasal polyposis  J33.9    3. Deviated nasal septum  J34.2       It was my pleasure seeing Pop Garcia today in clinic.   Unfortunately, the patient has not had improvement with maximal medical therapy.  He continues to have significant symptoms and persistent sinus disease on CT imaging.  He be a candidate for endoscopic sinus surgery.    We discussed the risks, benefits, alternatives, options of possible endonasal septoplasty, bilateral inferior turbinate reduction, bilateral endoscopic maxillary antrostomies, bilateral endoscopic total ethmoidectomies, bilateral endoscopic frontal sinusotomies, bilateral endoscopic sphenoidotomies, bilateral nasal polypectomy, image guidance including, but not limited to: Risk of bleeding, risk of infection, risk of postoperative pain, risk of septal hematoma, risk of septal perforation, risk of CSF leak, risk of injury to the orbital contents, risk of intranasal scarring or adhesions, risk of smell disturbance or smell loss, potential need for additional procedures, risk of general anesthesia.  The patient voiced understanding and is willing to proceed.  We discussed the postoperative course and convalescence including lifting and activity restrictions, placement of nasal splints, nasal saline irrigations postoperatively, postoperative debridement, and follow-up.    After speaking with [facility], he would be a candidate there based on his BMI.  Not sure if I can get the proper instrumentation at [facility].  I will contact [facility] to see if the instruments would be available to do his surgery.  If not, I may have to refer to the [facility].  I will contact the patient in the next couple days with the plan.    Pop to follow up with Primary Care provider regarding elevated blood pressure.    Fam Raphael MD  Department of Otolarygology-Head and Neck Surgery  [facility]

## 2020-10-26 ENCOUNTER — OFFICE VISIT (OUTPATIENT)
Dept: OTOLARYNGOLOGY | Facility: CLINIC | Age: 35
End: 2020-10-26
Payer: COMMERCIAL

## 2020-10-26 ENCOUNTER — HOSPITAL ENCOUNTER (OUTPATIENT)
Dept: CT IMAGING | Facility: CLINIC | Age: 35
Discharge: HOME | End: 2020-10-26
Attending: OTOLARYNGOLOGY | Admitting: OTOLARYNGOLOGY
Payer: COMMERCIAL

## 2020-10-26 VITALS
HEART RATE: 88 BPM | SYSTOLIC BLOOD PRESSURE: 148 MMHG | DIASTOLIC BLOOD PRESSURE: 86 MMHG | WEIGHT: 315 LBS | TEMPERATURE: 97.4 F | BODY MASS INDEX: 48.32 KG/M2

## 2020-10-26 DIAGNOSIS — J33.9 NASAL POLYPOSIS: ICD-10-CM

## 2020-10-26 DIAGNOSIS — J32.4 CHRONIC PANSINUSITIS: Primary | ICD-10-CM

## 2020-10-26 DIAGNOSIS — J32.4 CHRONIC PANSINUSITIS: ICD-10-CM

## 2020-10-26 DIAGNOSIS — J34.2 DEVIATED NASAL SEPTUM: ICD-10-CM

## 2020-10-26 PROCEDURE — 70486 CT MAXILLOFACIAL W/O DYE: CPT

## 2020-10-26 PROCEDURE — 99214 OFFICE O/P EST MOD 30 MIN: CPT | Performed by: OTOLARYNGOLOGY

## 2020-10-26 ASSESSMENT — PAIN SCALES - GENERAL: PAINLEVEL: NO PAIN (0)

## 2020-10-26 NOTE — NURSING NOTE
"Initial BP (!) 148/86 (BP Location: Right arm, Patient Position: Chair, Cuff Size: Adult Large)   Pulse 88   Temp 97.4  F (36.3  C) (Tympanic)   Wt (!) 180.1 kg (397 lb)   BMI 48.32 kg/m   Estimated body mass index is 48.32 kg/m  as calculated from the following:    Height as of 9/14/20: 1.93 m (6' 4\").    Weight as of this encounter: 180.1 kg (397 lb). .    Gina Fernandes LPN    "

## 2020-10-26 NOTE — LETTER
10/26/2020         RE: Pop Garcia  [address] [facility] [address]        Dear Colleague,    Thank you for referring your patient, Pop Garcia, to the [facility]. Please see a copy of my visit note below.    Chief Complaint   Patient presents with     Follow Up     he used the rinses for about 10 days and no change in symptoms - c/o nasal congestion      History of Present Illness  Pop Garcia is a 35 year old male who presents for nose and sinus follow-up.  I evaluated patient on 9/14/2020.  He was found to have nasal polyposis on examination.  I started the patient on budesonide irrigations following Kenalog injection.  We contacted the patient, and he was not noticing symptomatic improvement with medical management.  I ordered a CT scan of his sinuses performed earlier today.      My review of the patient's sinus CT performed 10/26/2020 shows severe persistent bilateral sinus disease with complete opacification of the bilateral frontal sinuses maxillary sinuses.  There is near complete opacification of the bilateral ethmoid systems right greater than left.  There is sinus mucosal thickening without air-fluid level in the bilateral sphenoid sinuses.  There is a slight rightward anterior-superior nasal septal deviation.  There is obvious nasal polyposis bilaterally.    Past Medical History  Patient Active Problem List   Diagnosis     Tobacco use disorder     Morbid obesity (H)     Reflux esophagitis     Left axis deviation     CARDIOVASCULAR SCREENING; LDL GOAL LESS THAN 130     Vitamin D deficiency     Attention deficit hyperactivity disorder (ADHD)     Marijuana use, continuous     Chronic frontal sinusitis     Chronic rhinitis     PVC's (premature ventricular contractions)     SILVERIO (generalized anxiety disorder)     Current Medications    Current Outpatient Medications:      multivitamin w/minerals (MULTI-VITAMIN) tablet, Take 1 tablet by mouth daily, Disp: , Rfl:      budesonide  (PULMICORT) 0.5 MG/2ML neb solution, Place 0.5 mg/2 mL budesonide vial in 8 oz normal saline sinus rinse bottle.  Irrigate each nostril with one half of the bottle twice daily. (Patient not taking: Reported on 10/26/2020), Disp: 360 mL, Rfl: 3     IBUPROFEN PO, , Disp: , Rfl:      omeprazole (PRILOSEC) 40 MG DR capsule, Take 1 capsule (40 mg) by mouth daily Take 20-30 minutes prior to morning meal (Patient not taking: Reported on 10/26/2020), Disp: 90 capsule, Rfl: 1    Allergies  No Known Allergies    Social History  Social History     Socioeconomic History     Marital status: Single     Spouse name: Not on file     Number of children: Not on file     Years of education: Not on file     Highest education level: Not on file   Occupational History     Not on file   Social Needs     Financial resource strain: Not on file     Food insecurity     Worry: Not on file     Inability: Not on file     Transportation needs     Medical: Not on file     Non-medical: Not on file   Tobacco Use     Smoking status: Current Every Day Smoker     Packs/day: 0.50     Years: 10.00     Pack years: 5.00     Types: Cigarettes     Smokeless tobacco: Former User     Types: Chew   Substance and Sexual Activity     Alcohol use: Yes     Comment: weekends.      Drug use: No     Sexual activity: Yes     Partners: Female     Birth control/protection: Pill   Lifestyle     Physical activity     Days per week: Not on file     Minutes per session: Not on file     Stress: Not on file   Relationships     Social connections     Talks on phone: Not on file     Gets together: Not on file     Attends Holiness service: Not on file     Active member of club or organization: Not on file     Attends meetings of clubs or organizations: Not on file     Relationship status: Not on file     Intimate partner violence     Fear of current or ex partner: Not on file     Emotionally abused: Not on file     Physically abused: Not on file     Forced sexual activity: Not  on file   Other Topics Concern     Parent/sibling w/ CABG, MI or angioplasty before 65F 55M? No   Social History Narrative     Not on file       Family History  Family History   Problem Relation Age of Onset     Diabetes Maternal Grandmother      Heart Disease Brother         hole in heart     Cerebrovascular Disease Maternal Grandfather      C.A.D. Maternal Grandfather      Alcoholism Paternal Grandfather      Cancer - colorectal No family hx of      Prostate Cancer No family hx of      Anesthesia Reaction No family hx of        Review of Systems  As per HPI and PMHx, otherwise 10 system review including the head and neck, constitutional, eyes, respiratory, GI, skin, neurologic, lymphatic, endocrine, and allergy systems is negative.    Physical Exam  BP (!) 148/86 (BP Location: Right arm, Patient Position: Chair, Cuff Size: Adult Large)   Pulse 88   Temp 97.4  F (36.3  C) (Tympanic)   Wt (!) 180.1 kg (397 lb)   BMI 48.32 kg/m    GENERAL: Patient is a pleasant, cooperative 35 year old male in no acute distress.  HEAD: Normocephalic, atraumatic.  Hair and scalp are normal.  EYES: Pupils are equal, round, reactive to light and accommodation.  Extraocular movements are intact.  The sclera nonicteric without injection.  The extraocular structures are normal.  EARS: Normal shape and symmetry.  No tenderness when palpating the mastoid or tragal areas bilaterally.    NOSE: Nares are patent.  Nasal mucosa is boggy and inflamed with sticky, inflammatory mucus.  The patient has severe/marked inferior turbinate hypertrophy bilaterally.  NEUROLOGIC: Cranial nerves II through XII are grossly intact.  Voice is strong.  Patient is House-Brackmann I/VI bilaterally.  CARDIOVASCULAR: Extremities are warm and well-perfused.  No significant peripheral edema.  RESPIRATORY: Patient has nonlabored breathing without cough, wheeze, stridor.  PSYCHIATRIC: Patient is alert and oriented.  Mood and affect appear normal.  SKIN: Warm and dry.   No scalp, face, or neck lesions noted.    Assessment and Plan     ICD-10-CM    1. Chronic pansinusitis  J32.4    2. Nasal polyposis  J33.9    3. Deviated nasal septum  J34.2       It was my pleasure seeing Pop Garcia today in clinic.  Unfortunately, the patient has not had improvement with maximal medical therapy.  He continues to have significant symptoms and persistent sinus disease on CT imaging.  He be a candidate for endoscopic sinus surgery.    We discussed the risks, benefits, alternatives, options of possible endonasal septoplasty, bilateral inferior turbinate reduction, bilateral endoscopic maxillary antrostomies, bilateral endoscopic total ethmoidectomies, bilateral endoscopic frontal sinusotomies, bilateral endoscopic sphenoidotomies, bilateral nasal polypectomy, image guidance including, but not limited to: Risk of bleeding, risk of infection, risk of postoperative pain, risk of septal hematoma, risk of septal perforation, risk of CSF leak, risk of injury to the orbital contents, risk of intranasal scarring or adhesions, risk of smell disturbance or smell loss, potential need for additional procedures, risk of general anesthesia.  The patient voiced understanding and is willing to proceed.  We discussed the postoperative course and convalescence including lifting and activity restrictions, placement of nasal splints, nasal saline irrigations postoperatively, postoperative debridement, and follow-up.    After speaking with [place] [facility], he would be a candidate there based on his BMI.  Not sure if I can get the proper instrumentation at [facility].  I will contact [facility] to see if the instruments would be available to do his surgery.  If not, I may have to refer to the [facility].  I will contact the patient in the next couple days with the plan.    Pop to follow up with Primary Care provider regarding elevated blood pressure.    Fam Raphael,  MD  Department of Otolarygology-Head and Neck Surgery  [facility]         Again, thank you for allowing me to participate in the care of your patient.        Sincerely,        Fam Raphael MD

## 2020-10-28 DIAGNOSIS — J34.2 DEVIATED NASAL SEPTUM: ICD-10-CM

## 2020-10-28 DIAGNOSIS — J33.9 NASAL POLYPOSIS: ICD-10-CM

## 2020-10-28 DIAGNOSIS — J32.4 CHRONIC PANSINUSITIS: Primary | ICD-10-CM

## 2020-11-02 ENCOUNTER — TELEPHONE (OUTPATIENT)
Dept: OTOLARYNGOLOGY | Facility: CLINIC | Age: 35
End: 2020-11-02

## 2020-11-02 NOTE — TELEPHONE ENCOUNTER
Type of surgery: SINUS SURGERY ENDOSCOPIC USING OPTICAL TRACKING SYSTEM BILATERAL SEPTOPLASTY NOSE WITH TURBINATOPLASTY  CPT 87345, 63843, 23167, 56432  Location of surgery: [facility]  Date and time of surgery: 12/08/2020  Surgeon: CAPRI  Pre-Op Appt Date: 12/01/2020  Post-Op Appt Date: 12/14/2020, 12/31/2020   Packet sent out: Yes  Pre-cert/Authorization completed:    No prior auth needed, per [employer] online list  Date: 11/02/2020    Thank you,   Joellen Morales   Prior Authorization Department  [phone]

## 2020-12-01 ENCOUNTER — OFFICE VISIT (OUTPATIENT)
Dept: FAMILY MEDICINE | Facility: CLINIC | Age: 35
End: 2020-12-01
Payer: COMMERCIAL

## 2020-12-01 VITALS
TEMPERATURE: 97.3 F | HEART RATE: 82 BPM | SYSTOLIC BLOOD PRESSURE: 134 MMHG | DIASTOLIC BLOOD PRESSURE: 86 MMHG | BODY MASS INDEX: 47.39 KG/M2 | OXYGEN SATURATION: 98 % | WEIGHT: 315 LBS

## 2020-12-01 DIAGNOSIS — B36.0 TINEA VERSICOLOR: ICD-10-CM

## 2020-12-01 DIAGNOSIS — L72.9 SUBCUTANEOUS CYST: ICD-10-CM

## 2020-12-01 DIAGNOSIS — Z01.818 PREOP GENERAL PHYSICAL EXAM: Primary | ICD-10-CM

## 2020-12-01 DIAGNOSIS — J32.4 CHRONIC PANSINUSITIS: ICD-10-CM

## 2020-12-01 PROCEDURE — 99214 OFFICE O/P EST MOD 30 MIN: CPT | Performed by: INTERNAL MEDICINE

## 2020-12-01 RX ORDER — KETOCONAZOLE 20 MG/ML
SHAMPOO TOPICAL
Qty: 120 ML | Refills: 3 | Status: SHIPPED | OUTPATIENT
Start: 2020-12-01 | End: 2021-05-24

## 2020-12-01 NOTE — PATIENT INSTRUCTIONS
"Avoid ibuprofen for 1 day prior to surgery, avoid naproxen for 3 days prior, and avoid products containing aspirin for 1 week before surgery.  Tylenol is okay to take for pain if needed.     Preparing for Your Surgery  Getting started  A surgery nurse will call you to review your health history and instructions. They will give you an arrival time based on your scheduled surgery time.  Please be ready to share the following:    Your doctor's clinic name and phone number    Your medical, surgical and anesthesia history    A list of allergies and sensitivities    A list of medicines, including herbal treatments and over-the-counter drugs    Whether the patient has a legal guardian (ask how to send us the papers in advance)  If your child is having surgery, please ask for a copy of Preparing for Your Child's Surgery.    Preparing for surgery    Within 30 days of surgery: Have an exam at your family clinic (primary care clinic), or go to a pre-operative clinic. This exam is called a \"History and Physical,\" or H&P.    At your H&P exam, talk to your care team about all medicines you take. If you need to stop any medicines before surgery, ask when to start taking them again.  ? We do this for your safety. Many medicines can make you bleed too much during surgery. Some change how well surgery (anesthesia) drugs work.    Call your insurance company to see what it will and won't pay for. Ask if they need to pre-approve the surgery. (If no insurance, call [phone].)    Call your surgeon's clinic if there's any change in your health. This includes signs of a cold or flu (sore throat, runny nose, cough, rash, fever). It also includes a scrape or scratch near the surgery site.    If you have questions on the day of surgery, call your surgery center.  Eating and drinking guidelines  For your safety: Unless your surgeon tells you otherwise, follow the guidelines below.    Eat and drink as usual until 8 hours before surgery. " After that, no food or milk.    Drink clear liquids until 2 hours before surgery. These are liquids you can see through, like water, Gatorade and Propel Water. You may also have black coffee and tea (no cream or milk).    Nothing by mouth within 2 hours of surgery. This includes gum, candy and breath mints.    Stop alcohol the midnight before surgery.    If your family clinic tells you to take medicine on the morning of surgery, it's okay to take it with a sip of water.  Preventing infection    Shower or bathe the night before and morning of your surgery. Follow the instructions your clinic gave you. (If no instructions, use regular soap.)    Don't shave or clip hair near your surgery site. This can lead to skin infection.    Don't smoke the morning of surgery. Smoking increases the risk of infection. You may chew nicotine gum up to 2 hours before surgery. A nicotine patch is okay.  ? Note: Some surgeries require you to completely quit smoking and nicotine. Check with your surgeon.    Your care team will make every effort to keep you safe from infection. We will:  ? Clean our hands often with soap and water (or an alcohol-based hand rub).  ? Clean the skin at your surgery site with a special soap that kills germs. We'll also remove hair from the site as needed.  ? Wear special hair covers, masks, gowns and gloves during surgery.  ? Give antibiotic medicine, if prescribed. Not all surgeries need antibiotics.  What to bring on the day of surgery    Photo ID and insurance card    Copy of your health care directive, if you have one    Glasses and hearing aides (bring cases)  ? You can't wear contacts during surgery    Inhaler and eye drops, if you use them (tell us about these when you arrive)    CPAP machine or breathing device, if you use them    A few personal items, if spending the night    If you have . . .  ? A pacemaker or ICD (cardiac defibrillator): Bring the ID card.  ? An implanted stimulator: Bring the  remote control.  ? A legal guardian: Bring a copy of the certified (court-stamped) guardianship papers.  Please remove any jewelry, including body piercings. Leave jewelry and other valuables at home.  If you're going home the day of surgery  Important: If you don't follow the rules below, we must cancel your surgery.     Arrange for someone to drive you home after surgery. You may not drive, take a taxi or take public transportation by yourself (unless you'll have local anesthesia only).    Arrange for a responsible adult to stay with you overnight. If you don't, we may keep you in the hospital overnight, and you may need to pay the costs yourself.  Questions?   If you have any questions for your care team, list them here: _________________________________________________________________________________________________________________________________________________________________________________________________________________________________________________________________________________________________________________________  For informational purposes only. Not to replace the advice of your health care provider. Copyright   4631-0526 [facility]. All rights reserved. Clinically reviewed by Willa Kaur MD. ALTILIA [id number] - REV 07/19.

## 2020-12-01 NOTE — PROGRESS NOTES
[facility]  [address] [place] [address]  Phone: [phone]  Primary Provider: [facility]  Pre-op Performing Provider: HARINDER MCNEAL    PREOPERATIVE EVALUATION:  Today's date: 12/1/2020    Pop Garcia is a 35 year old male who presents for a preoperative evaluation.  Chief Complaint   Patient presents with     Pre-Op Exam     DOS 12/08/20, Dr. Raphael @ [facility], septoplasty        Surgical Information:  Surgery/Procedure: sinus surgery - bilateral septoplasty nose w/ turbinatoplasty   Surgery Location: [facility]   Surgeon: Donavon   Surgery Date: 12/08/20  Time of Surgery: 3:00 pm   Where patient plans to recover: At home with family  Fax number for surgical facility: Note does not need to be faxed, will be available electronically in Epic.    Type of Anesthesia Anticipated: to be determined    Subjective     HPI related to upcoming procedure:  Pop has been having issues with chronic pansinusitis that did not improve with medical management so surgery is planned.    He is otherwise feeling well recently.  He has his COVID scheduled for Monday.      Right arm and R shoulder red rash for months- brighter in color recently.  Usually a more dull red.  Not itchy or painful.  Hasn't tried anything    Has a chronic nodule on the Left knee cap but it increased in size recently, hurts to kneel on it      Preop Questions 12/1/2020   1. Have you ever had a heart attack or stroke? No   2. Have you ever had surgery on your heart or blood vessels, such as a stent placement, a coronary artery bypass, or surgery on an artery in your head, neck, heart, or legs? No   3. Do you have chest pain with activity? No   4. Do you have a history of  heart failure? No   5. Do you currently have a cold, bronchitis or symptoms of other infection? No   6. Do you have a cough, shortness of breath, or wheezing? YES - smoking related and sinus related, nothing worse than usual   7.  Do you or anyone in your family have previous history of blood clots? YES - maternal grandfather    8. Do you or does anyone in your family have a serious bleeding problem such as prolonged bleeding following surgeries or cuts? No   9. Have you ever had problems with anemia or been told to take iron pills? No   10. Have you had any abnormal blood loss such as black, tarry or bloody stools? No   11. Have you ever had a blood transfusion? No   12. Are you willing to have a blood transfusion if it is medically needed before, during, or after your surgery? Yes   13. Have you or any of your relatives ever had problems with anesthesia? No   14. Do you have sleep apnea, excessive snoring or daytime drowsiness? No   15. Do you have any artifical heart valves or other implanted medical devices like a pacemaker, defibrillator, or continuous glucose monitor? No   16. Do you have artificial joints? No   17. Are you allergic to latex? No     Health Care Directive:  Patient does not have a Health Care Directive or Living Will: Discussed advance care planning with patient; however, patient declined at this time.    Preoperative Review of :  As below         Status of Chronic Conditions:  See problem list for active medical problems.  Problems all longstanding and stable, except as noted/documented.  See ROS for pertinent symptoms related to these conditions.      Review of Systems  Constitutional, neuro, ENT, endocrine, pulmonary, cardiac, gastrointestinal, genitourinary, musculoskeletal, integument and psychiatric systems are negative, except as otherwise noted.    Patient Active Problem List    Diagnosis Date Noted     SILVERIO (generalized anxiety disorder) 02/19/2020     Priority: Medium     PVC's (premature ventricular contractions) 02/12/2020     Priority: Medium     Chronic frontal sinusitis 12/04/2015     Priority: Medium     Chronic rhinitis 12/04/2015     Priority: Medium     Marijuana use, continuous 10/06/2015      Priority: Medium     Attention deficit hyperactivity disorder (ADHD) 10/07/2014     Priority: Medium     tested at [facility] counseling on 9/17/14 - diagnosis ADHD combined type, severe. paperwork scanned to chart   Problem list name updated by automated process. Provider to review       Vitamin D deficiency 06/25/2012     Priority: Medium     Left axis deviation 01/10/2011     Priority: Medium     CARDIOVASCULAR SCREENING; LDL GOAL LESS THAN 130 01/10/2011     Priority: Medium     Reflux esophagitis 11/28/2006     Priority: Medium     - happens daily.  Makes him throw up.  Was on nexium 40  Mg two times a day  in past.  Never omeprazole. Never anything else.     EGD  10/06 - LA Grade C reflux esophagitis.                 - [Congenital] dilation in the entire esophagus.                 - The examination was suspicious for an esophageal motility                  Disorder.  - subsequent motility tests - normal        Tobacco use disorder 09/26/2005     Priority: Medium     Still - smokes 1/2 ppd x 9 years.         Morbid obesity (H) 09/26/2005     Priority: Medium      Past Medical History:   Diagnosis Date     Attention deficit disorder with hyperactivity(314.01)      Chronic depressive personality disorder     in past not currently.      Juvenile osteochondrosis of lower extremity, excluding foot     Osgood-Schlatters - currently not bothering much     Morbid obesity (H)      Oppositional defiant disorder of childhood or adolescence     as kid     TOBACCO ABUSE-CONTINUOUS      Past Surgical History:   Procedure Laterality Date     LASIK CUSTOMVUE BILATERAL  8/15/2012    Procedure: LASIK CUSTOMVUE BILATERAL;  BILATERAL LASIK CUSTOMVUE  WITH INTRALASE ;  Surgeon: Luis Johnson MD;  Location: [facility]     SURGICAL HISTORY OF -   1991    tonsils & adenoids     WAVESCAN SCREENING  8/15/2012    Procedure: WAVESCAN SCREENING;  BILATERAL WAVESCAN SCREENING ;  Surgeon: Luis Johnson MD;  [facility]: [facility]  GASTROSCOPY,FL  10/2006    eosphagitis,  nonpatent sphincter     Current Outpatient Medications   Medication Sig Dispense Refill     budesonide (PULMICORT) 0.5 MG/2ML neb solution Place 0.5 mg/2 mL budesonide vial in 8 oz normal saline sinus rinse bottle.  Irrigate each nostril with one half of the bottle twice daily. (Patient not taking: Reported on 10/26/2020) 360 mL 3     IBUPROFEN PO        multivitamin w/minerals (MULTI-VITAMIN) tablet Take 1 tablet by mouth daily       omeprazole (PRILOSEC) 40 MG DR capsule Take 1 capsule (40 mg) by mouth daily Take 20-30 minutes prior to morning meal (Patient not taking: Reported on 10/26/2020) 90 capsule 1       No Known Allergies     Social History     Tobacco Use     Smoking status: Current Every Day Smoker     Packs/day: 1.00     Years: 10.00     Pack years: 10.00     Types: Cigarettes     Smokeless tobacco: Current User     Types: Chew   Substance Use Topics     Alcohol use: Yes     Comment: weekends.      Family History   Problem Relation Age of Onset     Diabetes Maternal Grandmother      Heart Disease Brother         hole in heart     Cerebrovascular Disease Maternal Grandfather      C.A.D. Maternal Grandfather      Alcoholism Paternal Grandfather      Cancer - colorectal No family hx of      Prostate Cancer No family hx of      Anesthesia Reaction No family hx of      History   Drug Use No         Objective     /86 (BP Location: Left arm, Patient Position: Sitting, Cuff Size: Adult Large)   Pulse 82   Temp 97.3  F (36.3  C) (Tympanic)   Wt (!) 176.6 kg (389 lb 4.8 oz)   SpO2 98%   BMI 47.39 kg/m      Physical Exam      GENERAL APPEARANCE: healthy, alert and no distress     HENT: normal ear canals and TMs, nose and mouth without ulcers or lesions     NECK: no adenopathy, no asymmetry, masses, or scars     RESP: lungs clear to auscultation - no rales, rhonchi or wheezes     CV: regular rates and rhythm, normal S1 S2, no S3 or S4 and no murmur, click or rub  -     ABDOMEN:  soft, nontender, no HSM or masses and bowel sounds normal     MS: 2 cm slightly mobile, round, subcutaneous mass over left knee cap       NEURO: mentation intact and speech normal     PSYCH: mentation appears normal. and affect normal/bright     LYMPHATICS: No cervical or supraclavicular nodes   DERM: scattered light red circular patches on R upper arm and shoulder    Recent Labs   Lab Test 01/27/20  1446   HGB 15.9         POTASSIUM 4.1   CR 0.82        Diagnostics:  No labs were ordered during this visit.   No EKG required, no history of coronary heart disease, significant arrhythmia, peripheral arterial disease or other structural heart disease.    Revised Cardiac Risk Index (RCRI):  The patient has the following serious cardiovascular risks for perioperative complications:   - No serious cardiac risks = 0 points     RCRI Interpretation: 0 points: Class I (very low risk - 0.4% complication rate)       Assessment & Plan   The proposed surgical procedure is considered INTERMEDIATE risk.    Preop general physical exam  and  Chronic pansinusitis    Okay to prooced with surgery    Tinea versicolor    Rash on R arm and shoulder looks like it may be tinea versicolor- will try treatment with ketoconazole    - ketoconazole (NIZORAL) 2 % external shampoo; Apply to affected area of damp skin, lather, leave on 5 minutes, and rinse (one application is usually sufficient)    Subcutaneous cyst    Mass on left knee appears to most likely be a cyst.  He wonders about drainage, advised him often cysts can recur with just drainage and that removal would be more definitive.  He may pursue this after his sinus surgery, defers for now.       Risks and Recommendations:  The patient has the following additional risks and recommendations for perioperative complications:   - No identified additional risk factors other than previously addressed    Medication Instructions:   - ibuprofen (Advil, Motrin): HOLD 1  day before surgery.     RECOMMENDATION:  APPROVAL GIVEN to proceed with proposed procedure, without further diagnostic evaluation.    Signed Electronically by: Marvin Ramirez MD    Copy of this evaluation report is provided to requesting physician.    Preop [facility] Preop Guidelines    Revised Cardiac Risk Index

## 2020-12-03 ENCOUNTER — DOCUMENTATION ONLY (OUTPATIENT)
Dept: LAB | Facility: CLINIC | Age: 35
End: 2020-12-03

## 2020-12-03 DIAGNOSIS — Z20.822 ENCOUNTER FOR LABORATORY TESTING FOR COVID-19 VIRUS: Primary | ICD-10-CM

## 2020-12-03 NOTE — PROGRESS NOTES
Covid tests prior to procedures are ordered by the surgeon.  Patient has septoplasty scheduled for 12-8-20 with Dr. Raphael,    Routing Covid test request to Dr. Raphael.    Latrice GAFFNEY MSN, RN

## 2020-12-03 NOTE — PROGRESS NOTES
Pop needs a COVID test before his procedure.  Please place an order.  His lab appointment is Monday.  Thanks

## 2020-12-07 DIAGNOSIS — Z20.822 ENCOUNTER FOR LABORATORY TESTING FOR COVID-19 VIRUS: ICD-10-CM

## 2020-12-07 LAB
LABORATORY COMMENT REPORT: NORMAL
SARS-COV-2 RNA SPEC QL NAA+PROBE: NEGATIVE
SARS-COV-2 RNA SPEC QL NAA+PROBE: NORMAL
SPECIMEN SOURCE: NORMAL
SPECIMEN SOURCE: NORMAL

## 2020-12-07 PROCEDURE — U0003 INFECTIOUS AGENT DETECTION BY NUCLEIC ACID (DNA OR RNA); SEVERE ACUTE RESPIRATORY SYNDROME CORONAVIRUS 2 (SARS-COV-2) (CORONAVIRUS DISEASE [COVID-19]), AMPLIFIED PROBE TECHNIQUE, MAKING USE OF HIGH THROUGHPUT TECHNOLOGIES AS DESCRIBED BY CMS-2020-01-R: HCPCS | Performed by: OTOLARYNGOLOGY

## 2020-12-11 ENCOUNTER — TELEPHONE (OUTPATIENT)
Dept: OTOLARYNGOLOGY | Facility: CLINIC | Age: 35
End: 2020-12-11

## 2020-12-11 ENCOUNTER — OFFICE VISIT (OUTPATIENT)
Dept: OTOLARYNGOLOGY | Facility: CLINIC | Age: 35
End: 2020-12-11
Payer: COMMERCIAL

## 2020-12-11 VITALS
HEART RATE: 83 BPM | SYSTOLIC BLOOD PRESSURE: 145 MMHG | BODY MASS INDEX: 47.35 KG/M2 | WEIGHT: 315 LBS | TEMPERATURE: 98 F | DIASTOLIC BLOOD PRESSURE: 83 MMHG

## 2020-12-11 DIAGNOSIS — F17.200 TOBACCO DEPENDENCE SYNDROME: ICD-10-CM

## 2020-12-11 DIAGNOSIS — J32.4 CHRONIC PANSINUSITIS: Primary | ICD-10-CM

## 2020-12-11 DIAGNOSIS — Z71.6 ENCOUNTER FOR TOBACCO USE CESSATION COUNSELING: ICD-10-CM

## 2020-12-11 DIAGNOSIS — Z98.890 STATUS POST FUNCTIONAL ENDOSCOPIC SINUS SURGERY: ICD-10-CM

## 2020-12-11 DIAGNOSIS — Z98.890 STATUS POST NASAL SEPTOPLASTY: ICD-10-CM

## 2020-12-11 DIAGNOSIS — J34.2 DEVIATED NASAL SEPTUM: ICD-10-CM

## 2020-12-11 DIAGNOSIS — Z98.890 POSTOPERATIVE STATE: ICD-10-CM

## 2020-12-11 DIAGNOSIS — J33.9 NASAL POLYPOSIS: ICD-10-CM

## 2020-12-11 PROCEDURE — 31237 NSL/SINS NDSC SURG BX POLYPC: CPT | Performed by: OTOLARYNGOLOGY

## 2020-12-11 ASSESSMENT — PAIN SCALES - GENERAL: PAINLEVEL: NO PAIN (0)

## 2020-12-11 NOTE — TELEPHONE ENCOUNTER
Reason for Call:  Other call back    Detailed comments: pt calling stating he had a procedure done on Tues. Would like to know if he can come in today and have nose stents removed today? They are very uncomfortable    Phone Number Patient can be reached at: Home number on file [phone] (home)    Best Time: any     Can we leave a detailed message on this number? YES    Call taken on 12/11/2020 at 8:37 AM by Kari Núñez

## 2020-12-11 NOTE — TELEPHONE ENCOUNTER
I spoke to Pop after speaking to Dr Raphael. Dr Raphael will work Pop in between OR procedures. Pop will be here at 9:30am. Mary XIONG Rn

## 2020-12-11 NOTE — LETTER
12/11/2020         RE: Pop Garcia  [address] [facility] [place] [address]        Dear Colleague,    Thank you for referring your patient, Pop Garcia, to the [facility]. Please see a copy of my visit note below.    Chief Complaint   Patient presents with     Hip Surgery Followup     sinus surgery on 12/8/20 at  here today to joseph butt     History of Present Illness  Pop Garcia is a 35 year old male who presents today for follow-up.  The patient went to the operating room on 12/8/2020 for bilateral functional endoscopic sinus surgery and septoplasty.  The patient's been having trouble with his nasal splints and breathing out of his nose.  He has been unable to use his irrigations.  Presents today for repeat nasal examination and debridement.  He denies any significant pain or nasal bleeding.     Past Medical History  Patient Active Problem List   Diagnosis     Tobacco use disorder     Morbid obesity (H)     Reflux esophagitis     Left axis deviation     CARDIOVASCULAR SCREENING; LDL GOAL LESS THAN 130     Vitamin D deficiency     Attention deficit hyperactivity disorder (ADHD)     Marijuana use, continuous     Chronic frontal sinusitis     Chronic rhinitis     PVC's (premature ventricular contractions)     SILVERIO (generalized anxiety disorder)     Current Medications    Current Outpatient Medications:      budesonide (PULMICORT) 0.5 MG/2ML neb solution, Place 0.5 mg/2 mL budesonide vial in 8 oz normal saline sinus rinse bottle.  Irrigate each nostril with one half of the bottle twice daily., Disp: 360 mL, Rfl: 3     ketoconazole (NIZORAL) 2 % external shampoo, Apply to affected area of damp skin, lather, leave on 5 minutes, and rinse (one application is usually sufficient), Disp: 120 mL, Rfl: 3     multivitamin w/minerals (MULTI-VITAMIN) tablet, Take 1 tablet by mouth daily, Disp: , Rfl:      omeprazole (PRILOSEC) 40 MG DR capsule, Take 1 capsule (40 mg) by mouth daily Take 20-30 minutes  prior to morning meal, Disp: 90 capsule, Rfl: 1     IBUPROFEN PO, , Disp: , Rfl:     Allergies  No Known Allergies    Social History  Social History     Socioeconomic History     Marital status: Single     Spouse name: None     Number of children: None     Years of education: None     Highest education level: None   Occupational History     None   Social Needs     Financial resource strain: None     Food insecurity     Worry: None     Inability: None     Transportation needs     Medical: None     Non-medical: None   Tobacco Use     Smoking status: Current Every Day Smoker     Packs/day: 1.00     Years: 10.00     Pack years: 10.00     Types: Cigarettes     Smokeless tobacco: Current User     Types: Chew   Substance and Sexual Activity     Alcohol use: Yes     Comment: weekends.      Drug use: No     Sexual activity: Yes     Partners: Female     Birth control/protection: Pill   Lifestyle     Physical activity     Days per week: None     Minutes per session: None     Stress: None   Relationships     Social connections     Talks on phone: None     Gets together: None     Attends [employer] service: None     Active member of club or organization: None     Attends meetings of clubs or organizations: None     Relationship status: None     Intimate partner violence     Fear of current or ex partner: None     Emotionally abused: None     Physically abused: None     Forced sexual activity: None   Other Topics Concern     Parent/sibling w/ CABG, MI or angioplasty before 65F 55M? No   Social History Narrative     None       Family History  Family History   Problem Relation Age of Onset     Diabetes Maternal Grandmother      Heart Disease Brother         hole in heart     Cerebrovascular Disease Maternal Grandfather      FRANKIEAJAMES Maternal Grandfather      Alcoholism Paternal Grandfather      Cancer - colorectal No family hx of      Prostate Cancer No family hx of      Anesthesia Reaction No family hx of        Review of Systems  As  per HPI and PMHx, otherwise 10 system review including the head and neck, constitutional, eyes, respiratory, GI, skin, neurologic, lymphatic, endocrine, and allergy systems is negative.    Physical Exam  BP (!) 145/83 (BP Location: Right arm, Patient Position: Chair, Cuff Size: Adult Large)   Pulse 83   Temp 98  F (36.7  C) (Tympanic)   Wt (!) 176.4 kg (389 lb)   BMI 47.35 kg/m    GENERAL: Patient is a pleasant, cooperative 35 year old male in no acute distress.  HEAD: Normocephalic, atraumatic.  Hair and scalp are normal.  EYES: Pupils are equal, round, reactive to light and accommodation.  Extraocular movements are intact.  The sclera nonicteric without injection.  The extraocular structures are normal.  EARS: Normal shape and symmetry.  No tenderness when palpating the mastoid or tragal areas bilaterally.    NOSE: Conti splints are in good placement.  The stitch and Conti splints are removed.  The nasal septum is midline without any evidence of septal hematoma.  The inferior turbinates are well lateralized.  The bilateral nasal cavities were suctioned free.  The patient noticed immediate improvement in the breathing.  The patient tolerated the procedure well.  NEUROLOGIC: Cranial nerves II through XII are grossly intact.  Voice is strong.  Patient is House-Brackman I/VI bilaterally.  CARDIOVASCULAR: Extremities are warm and well-perfused.  No significant peripheral edema.  RESPIRATORY: Patient has nonlabored breathing without cough, wheeze, stridor.  PSYCHIATRIC: Patient is alert and oriented.  Mood and affect appear normal.  SKIN: Warm and dry.  No scalp, face, or neck lesions noted.    Procedure: Nasal sinus debridement   Indication: Status post endoscopic sinus surgery    To improve medication delivery and assist with healing following sinus surgery, the nasal cavities were examined and debrided.  I proceeded with nasal examination and debridement.  The bilateral nasal cavities were not anesthetized.  The  bilateral nasal cavities were visualized with a headlight and nasal speculum.  The right nasal cavity was debrided with a rigid suction, bayonet forceps, and Blakesley forceps.  I debrided some old blood and some nasal pore.  I left some Nasacort in the middle meatus.  There is no evidence of effusion or infection.  Attention was turned to the left.  The left nasal cavity was debrided with a rigid suction, bayonet forceps, and Blakesley forceps.  I debrided some old blood and some nasal pore.  I left some Nasacort in the middle meatus.  There is no evidence of effusion or infection.  The patient tolerated the procedure well.    Assessment and Plan     ICD-10-CM    1. Chronic pansinusitis  J32.4 NASAL/SINUS SCOPE W BIOPSY POLYPECT OR DEBRIDEMENT   2. Nasal polyposis  J33.9 NASAL/SINUS SCOPE W BIOPSY POLYPECT OR DEBRIDEMENT   3. Deviated nasal septum  J34.2 NASAL/SINUS SCOPE W BIOPSY POLYPECT OR DEBRIDEMENT   4. Status post functional endoscopic sinus surgery  Z98.890 NASAL/SINUS SCOPE W BIOPSY POLYPECT OR DEBRIDEMENT   5. Status post nasal septoplasty  Z98.890 NASAL/SINUS SCOPE W BIOPSY POLYPECT OR DEBRIDEMENT   6. Postoperative state  Z98.890 NASAL/SINUS SCOPE W BIOPSY POLYPECT OR DEBRIDEMENT   7. Tobacco dependence syndrome  F17.200    8. Encounter for tobacco use cessation counseling  Z71.6       It was my pleasure seeing Pop Garcia today in clinic.  The patient is healing well after his endoscopic sinus surgery and septoplasty.  I decided to remove his lengths and his nose was debrided today in office.  His septum appears to be healing well.  We discussed aggressive nasal saline irrigation using his budesonide and extra saline following rinses.  We discussed continued restrictions of nose blowing and lifting.  I will see him back next week for repeat examination and debridement.  Septoplasty and turbinate reduction.  We discussed an additional 1 week of nose blowing and activity restrictions.  We will have them  start nasal saline irrigation.  They can return to use of nasal sprays.  I would like to see the patient back in 2 to 3 months for recheck.  Patient knows to contact me sooner with any problems or concerns.    Pop to follow up with Primary Care provider regarding elevated blood pressure.    Fam Raphael MD  Department of Otolarygology-Head and Neck Surgery  [facility]         Again, thank you for allowing me to participate in the care of your patient.        Sincerely,        Fam Raphael MD

## 2020-12-11 NOTE — PROGRESS NOTES
Chief Complaint   Patient presents with     Hip Surgery Followup     sinus surgery on 12/8/20 at [facility] here today to joseph butt     History of Present Illness  Pop Garcia is a 35 year old male who presents today for follow-up.  The patient went to the operating room on 12/8/2020 for bilateral functional endoscopic sinus surgery and septoplasty.  The patient's been having trouble with his nasal splints and breathing out of his nose.  He has been unable to use his irrigations.  Presents today for repeat nasal examination and debridement.  He denies any significant pain or nasal bleeding.     Past Medical History  Patient Active Problem List   Diagnosis     Tobacco use disorder     Morbid obesity (H)     Reflux esophagitis     Left axis deviation     CARDIOVASCULAR SCREENING; LDL GOAL LESS THAN 130     Vitamin D deficiency     Attention deficit hyperactivity disorder (ADHD)     Marijuana use, continuous     Chronic frontal sinusitis     Chronic rhinitis     PVC's (premature ventricular contractions)     SILVERIO (generalized anxiety disorder)     Current Medications    Current Outpatient Medications:      budesonide (PULMICORT) 0.5 MG/2ML neb solution, Place 0.5 mg/2 mL budesonide vial in 8 oz normal saline sinus rinse bottle.  Irrigate each nostril with one half of the bottle twice daily., Disp: 360 mL, Rfl: 3     ketoconazole (NIZORAL) 2 % external shampoo, Apply to affected area of damp skin, lather, leave on 5 minutes, and rinse (one application is usually sufficient), Disp: 120 mL, Rfl: 3     multivitamin w/minerals (MULTI-VITAMIN) tablet, Take 1 tablet by mouth daily, Disp: , Rfl:      omeprazole (PRILOSEC) 40 MG DR capsule, Take 1 capsule (40 mg) by mouth daily Take 20-30 minutes prior to morning meal, Disp: 90 capsule, Rfl: 1     IBUPROFEN PO, , Disp: , Rfl:     Allergies  No Known Allergies    Social History  Social History     Socioeconomic History     Marital status: Single     Spouse name: None     Number of  children: None     Years of education: None     Highest education level: None   Occupational History     None   Social Needs     Financial resource strain: None     Food insecurity     Worry: None     Inability: None     Transportation needs     Medical: None     Non-medical: None   Tobacco Use     Smoking status: Current Every Day Smoker     Packs/day: 1.00     Years: 10.00     Pack years: 10.00     Types: Cigarettes     Smokeless tobacco: Current User     Types: Chew   Substance and Sexual Activity     Alcohol use: Yes     Comment: weekends.      Drug use: No     Sexual activity: Yes     Partners: Female     Birth control/protection: Pill   Lifestyle     Physical activity     Days per week: None     Minutes per session: None     Stress: None   Relationships     Social connections     Talks on phone: None     Gets together: None     Attends Spiritism service: None     Active member of club or organization: None     Attends meetings of clubs or organizations: None     Relationship status: None     Intimate partner violence     Fear of current or ex partner: None     Emotionally abused: None     Physically abused: None     Forced sexual activity: None   Other Topics Concern     Parent/sibling w/ CABG, MI or angioplasty before 65F 55M? No   Social History Narrative     None       Family History  Family History   Problem Relation Age of Onset     Diabetes Maternal Grandmother      Heart Disease Brother         hole in heart     Cerebrovascular Disease Maternal Grandfather      C.A.D. Maternal Grandfather      Alcoholism Paternal Grandfather      Cancer - colorectal No family hx of      Prostate Cancer No family hx of      Anesthesia Reaction No family hx of        Review of Systems  As per HPI and PMHx, otherwise 10 system review including the head and neck, constitutional, eyes, respiratory, GI, skin, neurologic, lymphatic, endocrine, and allergy systems is negative.    Physical Exam  BP (!) 145/83 (BP Location: Right  arm, Patient Position: Chair, Cuff Size: Adult Large)   Pulse 83   Temp 98  F (36.7  C) (Tympanic)   Wt (!) 176.4 kg (389 lb)   BMI 47.35 kg/m    GENERAL: Patient is a pleasant, cooperative 35 year old male in no acute distress.  HEAD: Normocephalic, atraumatic.  Hair and scalp are normal.  EYES: Pupils are equal, round, reactive to light and accommodation.  Extraocular movements are intact.  The sclera nonicteric without injection.  The extraocular structures are normal.  EARS: Normal shape and symmetry.  No tenderness when palpating the mastoid or tragal areas bilaterally.    NOSE: Conti splints are in good placement.  The stitch and Conti splints are removed.  The nasal septum is midline without any evidence of septal hematoma.  The inferior turbinates are well lateralized.  The bilateral nasal cavities were suctioned free.  The patient noticed immediate improvement in the breathing.  The patient tolerated the procedure well.  NEUROLOGIC: Cranial nerves II through XII are grossly intact.  Voice is strong.  Patient is House-Brackman I/VI bilaterally.  CARDIOVASCULAR: Extremities are warm and well-perfused.  No significant peripheral edema.  RESPIRATORY: Patient has nonlabored breathing without cough, wheeze, stridor.  PSYCHIATRIC: Patient is alert and oriented.  Mood and affect appear normal.  SKIN: Warm and dry.  No scalp, face, or neck lesions noted.    Procedure: Nasal sinus debridement   Indication: Status post endoscopic sinus surgery    To improve medication delivery and assist with healing following sinus surgery, the nasal cavities were examined and debrided.  I proceeded with nasal examination and debridement.  The bilateral nasal cavities were not anesthetized.  The bilateral nasal cavities were visualized with a headlight and nasal speculum.  The right nasal cavity was debrided with a rigid suction, bayonet forceps, and Blakesley forceps.  I debrided some old blood and some nasal pore.  I left some  Nasacort in the middle meatus.  There is no evidence of effusion or infection.  Attention was turned to the left.  The left nasal cavity was debrided with a rigid suction, bayonet forceps, and Blakesley forceps.  I debrided some old blood and some nasal pore.  I left some Nasacort in the middle meatus.  There is no evidence of effusion or infection.  The patient tolerated the procedure well.    Assessment and Plan     ICD-10-CM    1. Chronic pansinusitis  J32.4 NASAL/SINUS SCOPE W BIOPSY POLYPECT OR DEBRIDEMENT   2. Nasal polyposis  J33.9 NASAL/SINUS SCOPE W BIOPSY POLYPECT OR DEBRIDEMENT   3. Deviated nasal septum  J34.2 NASAL/SINUS SCOPE W BIOPSY POLYPECT OR DEBRIDEMENT   4. Status post functional endoscopic sinus surgery  Z98.890 NASAL/SINUS SCOPE W BIOPSY POLYPECT OR DEBRIDEMENT   5. Status post nasal septoplasty  Z98.890 NASAL/SINUS SCOPE W BIOPSY POLYPECT OR DEBRIDEMENT   6. Postoperative state  Z98.890 NASAL/SINUS SCOPE W BIOPSY POLYPECT OR DEBRIDEMENT   7. Tobacco dependence syndrome  F17.200    8. Encounter for tobacco use cessation counseling  Z71.6       It was my pleasure seeing Pop Garcia today in clinic.  The patient is healing well after his endoscopic sinus surgery and septoplasty.  I decided to remove his lengths and his nose was debrided today in office.  His septum appears to be healing well.  We discussed aggressive nasal saline irrigation using his budesonide and extra saline following rinses.  We discussed continued restrictions of nose blowing and lifting.  I will see him back next week for repeat examination and debridement.  Patient knows to contact me sooner with any problems or concerns.    Pop to follow up with Primary Care provider regarding elevated blood pressure.    Fam Raphael MD  Department of Otolarygology-Head and Neck Surgery  [facility]

## 2020-12-11 NOTE — NURSING NOTE
"Initial BP (!) 145/83 (BP Location: Right arm, Patient Position: Chair, Cuff Size: Adult Large)   Pulse 83   Temp 98  F (36.7  C) (Tympanic)   Wt (!) 176.4 kg (389 lb)   BMI 47.35 kg/m   Estimated body mass index is 47.35 kg/m  as calculated from the following:    Height as of 9/14/20: 1.93 m (6' 4\").    Weight as of this encounter: 176.4 kg (389 lb). .    Gina Fernandes LPN    "

## 2020-12-14 ENCOUNTER — OFFICE VISIT (OUTPATIENT)
Dept: OTOLARYNGOLOGY | Facility: CLINIC | Age: 35
End: 2020-12-14
Payer: COMMERCIAL

## 2020-12-14 VITALS
WEIGHT: 315 LBS | BODY MASS INDEX: 47.35 KG/M2 | HEART RATE: 83 BPM | DIASTOLIC BLOOD PRESSURE: 79 MMHG | TEMPERATURE: 98.4 F | SYSTOLIC BLOOD PRESSURE: 125 MMHG

## 2020-12-14 DIAGNOSIS — J32.4 CHRONIC PANSINUSITIS: Primary | ICD-10-CM

## 2020-12-14 DIAGNOSIS — J34.2 DEVIATED NASAL SEPTUM: ICD-10-CM

## 2020-12-14 DIAGNOSIS — J33.9 NASAL POLYPOSIS: ICD-10-CM

## 2020-12-14 DIAGNOSIS — F17.200 TOBACCO DEPENDENCE SYNDROME: ICD-10-CM

## 2020-12-14 DIAGNOSIS — Z71.6 ENCOUNTER FOR TOBACCO USE CESSATION COUNSELING: ICD-10-CM

## 2020-12-14 DIAGNOSIS — Z98.890 STATUS POST NASAL SEPTOPLASTY: ICD-10-CM

## 2020-12-14 DIAGNOSIS — Z98.890 POSTOPERATIVE STATE: ICD-10-CM

## 2020-12-14 DIAGNOSIS — Z98.890 STATUS POST FUNCTIONAL ENDOSCOPIC SINUS SURGERY: ICD-10-CM

## 2020-12-14 PROCEDURE — 31237 NSL/SINS NDSC SURG BX POLYPC: CPT | Performed by: OTOLARYNGOLOGY

## 2020-12-14 PROCEDURE — 96372 THER/PROPH/DIAG INJ SC/IM: CPT | Mod: 59 | Performed by: OTOLARYNGOLOGY

## 2020-12-14 RX ORDER — TRIAMCINOLONE ACETONIDE 40 MG/ML
80 INJECTION, SUSPENSION INTRA-ARTICULAR; INTRAMUSCULAR ONCE
Status: COMPLETED | OUTPATIENT
Start: 2020-12-14 | End: 2020-12-14

## 2020-12-14 RX ADMIN — TRIAMCINOLONE ACETONIDE 80 MG: 40 INJECTION, SUSPENSION INTRA-ARTICULAR; INTRAMUSCULAR at 11:38

## 2020-12-14 ASSESSMENT — PAIN SCALES - GENERAL: PAINLEVEL: NO PAIN (0)

## 2020-12-14 NOTE — PROGRESS NOTES
Chief Complaint   Patient presents with     Post-op Visit     sinus surgery on 12/8/20 no concerns today     History of Present Illness  Pop Garcia is a 35 year old male who presents today for follow-up.  The patient went to the operating room on 12/8/2020 for bilateral functional endoscopic sinus surgery and septoplasty.  He was having trouble with his nasal splints and breathing out of his nose.  I evaluated him on 12/11/2020.  I removed his splints and debrided his nose in office.  He returns today for follow up.    The patient reports that he has been doing his irrigations.  He feels like he is more plugged on the right-hand side. No significant pain or bleeding.  He presents today for repeat nasal examination and debridement.      Past Medical History  Patient Active Problem List   Diagnosis     Tobacco use disorder     Morbid obesity (H)     Reflux esophagitis     Left axis deviation     CARDIOVASCULAR SCREENING; LDL GOAL LESS THAN 130     Vitamin D deficiency     Attention deficit hyperactivity disorder (ADHD)     Marijuana use, continuous     Chronic frontal sinusitis     Chronic rhinitis     PVC's (premature ventricular contractions)     SILVERIO (generalized anxiety disorder)     Current Medications    Current Outpatient Medications:      budesonide (PULMICORT) 0.5 MG/2ML neb solution, Place 0.5 mg/2 mL budesonide vial in 8 oz normal saline sinus rinse bottle.  Irrigate each nostril with one half of the bottle twice daily., Disp: 360 mL, Rfl: 3     ketoconazole (NIZORAL) 2 % external shampoo, Apply to affected area of damp skin, lather, leave on 5 minutes, and rinse (one application is usually sufficient), Disp: 120 mL, Rfl: 3     multivitamin w/minerals (MULTI-VITAMIN) tablet, Take 1 tablet by mouth daily, Disp: , Rfl:      omeprazole (PRILOSEC) 40 MG DR capsule, Take 1 capsule (40 mg) by mouth daily Take 20-30 minutes prior to morning meal, Disp: 90 capsule, Rfl: 1     IBUPROFEN PO, , Disp: , Rfl:     Current  Facility-Administered Medications:      triamcinolone (KENALOG-40) injection 80 mg, 80 mg, Intramuscular, Once, Fam Raphael MD    Allergies  No Known Allergies    Social History  Social History     Socioeconomic History     Marital status: Single     Spouse name: Not on file     Number of children: Not on file     Years of education: Not on file     Highest education level: Not on file   Occupational History     Not on file   Social Needs     Financial resource strain: Not on file     Food insecurity     Worry: Not on file     Inability: Not on file     Transportation needs     Medical: Not on file     Non-medical: Not on file   Tobacco Use     Smoking status: Current Every Day Smoker     Packs/day: 1.00     Years: 10.00     Pack years: 10.00     Types: Cigarettes     Smokeless tobacco: Current User     Types: Chew   Substance and Sexual Activity     Alcohol use: Yes     Comment: weekends.      Drug use: No     Sexual activity: Yes     Partners: Female     Birth control/protection: Pill   Lifestyle     Physical activity     Days per week: Not on file     Minutes per session: Not on file     Stress: Not on file   Relationships     Social connections     Talks on phone: Not on file     Gets together: Not on file     Attends Yazidi service: Not on file     Active member of club or organization: Not on file     Attends meetings of clubs or organizations: Not on file     Relationship status: Not on file     Intimate partner violence     Fear of current or ex partner: Not on file     Emotionally abused: Not on file     Physically abused: Not on file     Forced sexual activity: Not on file   Other Topics Concern     Parent/sibling w/ CABG, MI or angioplasty before 65F 55M? No   Social History Narrative     Not on file       Family History  Family History   Problem Relation Age of Onset     Diabetes Maternal Grandmother      Heart Disease Brother         hole in heart     Cerebrovascular Disease Maternal Grandfather       C.A.D. Maternal Grandfather      Alcoholism Paternal Grandfather      Cancer - colorectal No family hx of      Prostate Cancer No family hx of      Anesthesia Reaction No family hx of        Review of Systems  As per HPI and PMHx, otherwise 10 system review including the head and neck, constitutional, eyes, respiratory, GI, skin, neurologic, lymphatic, endocrine, and allergy systems is negative.    Physical Exam  /79 (BP Location: Right arm, Patient Position: Chair, Cuff Size: Adult Large)   Pulse 83   Temp 98.4  F (36.9  C) (Tympanic)   Wt (!) 176.4 kg (389 lb)   BMI 47.35 kg/m    GENERAL: Patient is a pleasant, cooperative 35 year old male in no acute distress.  HEAD: Normocephalic, atraumatic.  Hair and scalp are normal.  EYES: Pupils are equal, round, reactive to light and accommodation.  Extraocular movements are intact.  The sclera nonicteric without injection.  The extraocular structures are normal.  EARS: Normal shape and symmetry.  No tenderness when palpating the mastoid or tragal areas bilaterally.    NOSE: Nares are patent.  Nasal mucosa is pink and moist.  The nasal septum is midline without any evidence of septal hematoma.  The inferior turbinates are well lateralized.    NEUROLOGIC: Cranial nerves II through XII are grossly intact.  Voice is strong.  Patient is House-Brackman I/VI bilaterally.  CARDIOVASCULAR: Extremities are warm and well-perfused.  No significant peripheral edema.  RESPIRATORY: Patient has nonlabored breathing without cough, wheeze, stridor.  PSYCHIATRIC: Patient is alert and oriented.  Mood and affect appear normal.  SKIN: Warm and dry.  No scalp, face, or neck lesions noted.    Procedure: Nasal sinus debridement   Indication: Status post endoscopic sinus surgery    To improve medication delivery and assist with healing following sinus surgery, the nasal cavities were examined and debrided.  I proceeded with rigid nasal endoscopic examination and debridement.  The  bilateral nasal cavities were anesthetized with 4% lidocaine and phenylephrine spray.  The bilateral nasal cavities were visualized with a 0 degree rigid endoscope.  The right nasal cavity was debrided with a rigid suction, bayonet forceps, and Blakesley forceps.  The patient had a widely patent maxillary antrostomy and ethmoid cavity.  The sphenoethmoid recess was clear.  The frontal sinus outflow area was clear.  There is a bit more inflammation on the right-hand side.  The middle turbinate was well medialized without adhesion.  Attention was turned to the left.  The left nasal cavity was debrided with a rigid suction, bayonet forceps, and Blakesley forceps.  The patient had a widely patent maxillary antrostomy and ethmoid cavity.  The sphenoethmoid recess was clear.  The frontal sinus outflow area was clear.  The middle turbinate was well medialized without adhesion.  The scope was removed.  The patient tolerated the procedure well.    Assessment and Plan     ICD-10-CM    1. Chronic pansinusitis  J32.4 NASAL/SINUS SCOPE W BIOPSY POLYPECT OR DEBRIDEMENT     triamcinolone (KENALOG-40) injection 80 mg   2. Nasal polyposis  J33.9 NASAL/SINUS SCOPE W BIOPSY POLYPECT OR DEBRIDEMENT     triamcinolone (KENALOG-40) injection 80 mg   3. Deviated nasal septum  J34.2 NASAL/SINUS SCOPE W BIOPSY POLYPECT OR DEBRIDEMENT     triamcinolone (KENALOG-40) injection 80 mg   4. Status post functional endoscopic sinus surgery  Z98.890 NASAL/SINUS SCOPE W BIOPSY POLYPECT OR DEBRIDEMENT     triamcinolone (KENALOG-40) injection 80 mg   5. Status post nasal septoplasty  Z98.890 NASAL/SINUS SCOPE W BIOPSY POLYPECT OR DEBRIDEMENT     triamcinolone (KENALOG-40) injection 80 mg   6. Postoperative state  Z98.890 NASAL/SINUS SCOPE W BIOPSY POLYPECT OR DEBRIDEMENT     triamcinolone (KENALOG-40) injection 80 mg   7. Tobacco dependence syndrome  F17.200    8. Encounter for tobacco use cessation counseling  Z71.6       It was my pleasure seeing Pop  Radha today in clinic. The patient is healing well after his endoscopic sinus surgery and septoplasty.  I debrided him today successfully in office.  He will continue his budesonide rinses.  His nose is still pretty inflamed.  We discussed a Kenalog injection today in office, 80 mg. We discussed the risks, benefits, options, goals of a intramuscular Kenalog injection today in office including, but not limited to: Risk of pain at injection site, risk of infection, side effects of systemic steroids including blood pressure problems, insulin resistance, weight gain, bone/joint issues, mood alteration.  Patient voiced understanding and is willing to proceed.    We discussed lifting restrictions of nose blowing and lifting.  I will see him back in 2 weeks for repeat examination and debridement.  Patient knows to contact me sooner with any problems or concerns.    Fam Raphael MD  Department of Otolarygology-Head and Neck Surgery  [facility]

## 2020-12-14 NOTE — LETTER
12/14/2020         RE: Pop Garcia  [address] [facility] [address]        Dear Colleague,    Thank you for referring your patient, Pop Garcia, to the [facility]. Please see a copy of my visit note below.    Chief Complaint   Patient presents with     Post-op Visit     sinus surgery on 12/8/20 no concerns today     History of Present Illness  Pop Garcia is a 35 year old male who presents today for follow-up.  The patient went to the operating room on 12/8/2020 for bilateral functional endoscopic sinus surgery and septoplasty.  He was having trouble with his nasal splints and breathing out of his nose.  I evaluated him on 12/11/2020.  I removed his splints and debrided his nose in office.  He returns today for follow up.    The patient reports that he has been doing his irrigations.  He feels like he is more plugged on the right-hand side. No significant pain or bleeding.  He presents today for repeat nasal examination and debridement.      Past Medical History  Patient Active Problem List   Diagnosis     Tobacco use disorder     Morbid obesity (H)     Reflux esophagitis     Left axis deviation     CARDIOVASCULAR SCREENING; LDL GOAL LESS THAN 130     Vitamin D deficiency     Attention deficit hyperactivity disorder (ADHD)     Marijuana use, continuous     Chronic frontal sinusitis     Chronic rhinitis     PVC's (premature ventricular contractions)     SILVERIO (generalized anxiety disorder)     Current Medications    Current Outpatient Medications:      budesonide (PULMICORT) 0.5 MG/2ML neb solution, Place 0.5 mg/2 mL budesonide vial in 8 oz normal saline sinus rinse bottle.  Irrigate each nostril with one half of the bottle twice daily., Disp: 360 mL, Rfl: 3     ketoconazole (NIZORAL) 2 % external shampoo, Apply to affected area of damp skin, lather, leave on 5 minutes, and rinse (one application is usually sufficient), Disp: 120 mL, Rfl: 3     multivitamin w/minerals (MULTI-VITAMIN)  tablet, Take 1 tablet by mouth daily, Disp: , Rfl:      omeprazole (PRILOSEC) 40 MG DR capsule, Take 1 capsule (40 mg) by mouth daily Take 20-30 minutes prior to morning meal, Disp: 90 capsule, Rfl: 1     IBUPROFEN PO, , Disp: , Rfl:     Current Facility-Administered Medications:      triamcinolone (KENALOG-40) injection 80 mg, 80 mg, Intramuscular, Once, Fam Raphael MD    Allergies  No Known Allergies    Social History  Social History     Socioeconomic History     Marital status: Single     Spouse name: Not on file     Number of children: Not on file     Years of education: Not on file     Highest education level: Not on file   Occupational History     Not on file   Social Needs     Financial resource strain: Not on file     Food insecurity     Worry: Not on file     Inability: Not on file     Transportation needs     Medical: Not on file     Non-medical: Not on file   Tobacco Use     Smoking status: Current Every Day Smoker     Packs/day: 1.00     Years: 10.00     Pack years: 10.00     Types: Cigarettes     Smokeless tobacco: Current User     Types: Chew   Substance and Sexual Activity     Alcohol use: Yes     Comment: weekends.      Drug use: No     Sexual activity: Yes     Partners: Female     Birth control/protection: Pill   Lifestyle     Physical activity     Days per week: Not on file     Minutes per session: Not on file     Stress: Not on file   Relationships     Social connections     Talks on phone: Not on file     Gets together: Not on file     Attends Episcopalian service: Not on file     Active member of club or organization: Not on file     Attends meetings of clubs or organizations: Not on file     Relationship status: Not on file     Intimate partner violence     Fear of current or ex partner: Not on file     Emotionally abused: Not on file     Physically abused: Not on file     Forced sexual activity: Not on file   Other Topics Concern     Parent/sibling w/ CABG, MI or angioplasty before 65F 55M? No    Social History Narrative     Not on file       Family History  Family History   Problem Relation Age of Onset     Diabetes Maternal Grandmother      Heart Disease Brother         hole in heart     Cerebrovascular Disease Maternal Grandfather      C.A.D. Maternal Grandfather      Alcoholism Paternal Grandfather      Cancer - colorectal No family hx of      Prostate Cancer No family hx of      Anesthesia Reaction No family hx of        Review of Systems  As per HPI and PMHx, otherwise 10 system review including the head and neck, constitutional, eyes, respiratory, GI, skin, neurologic, lymphatic, endocrine, and allergy systems is negative.    Physical Exam  /79 (BP Location: Right arm, Patient Position: Chair, Cuff Size: Adult Large)   Pulse 83   Temp 98.4  F (36.9  C) (Tympanic)   Wt (!) 176.4 kg (389 lb)   BMI 47.35 kg/m    GENERAL: Patient is a pleasant, cooperative 35 year old male in no acute distress.  HEAD: Normocephalic, atraumatic.  Hair and scalp are normal.  EYES: Pupils are equal, round, reactive to light and accommodation.  Extraocular movements are intact.  The sclera nonicteric without injection.  The extraocular structures are normal.  EARS: Normal shape and symmetry.  No tenderness when palpating the mastoid or tragal areas bilaterally.    NOSE: Nares are patent.  Nasal mucosa is pink and moist.  The nasal septum is midline without any evidence of septal hematoma.  The inferior turbinates are well lateralized.    NEUROLOGIC: Cranial nerves II through XII are grossly intact.  Voice is strong.  Patient is House-Brackman I/VI bilaterally.  CARDIOVASCULAR: Extremities are warm and well-perfused.  No significant peripheral edema.  RESPIRATORY: Patient has nonlabored breathing without cough, wheeze, stridor.  PSYCHIATRIC: Patient is alert and oriented.  Mood and affect appear normal.  SKIN: Warm and dry.  No scalp, face, or neck lesions noted.    Procedure: Nasal sinus debridement   Indication:  Status post endoscopic sinus surgery    To improve medication delivery and assist with healing following sinus surgery, the nasal cavities were examined and debrided.  I proceeded with rigid nasal endoscopic examination and debridement.  The bilateral nasal cavities were anesthetized with 4% lidocaine and phenylephrine spray.  The bilateral nasal cavities were visualized with a 0 degree rigid endoscope.  The right nasal cavity was debrided with a rigid suction, bayonet forceps, and Blakesley forceps.  The patient had a widely patent maxillary antrostomy and ethmoid cavity.  The sphenoethmoid recess was clear.  The frontal sinus outflow area was clear.  There is a bit more inflammation on the right-hand side.  The middle turbinate was well medialized without adhesion.  Attention was turned to the left.  The left nasal cavity was debrided with a rigid suction, bayonet forceps, and Blakesley forceps.  The patient had a widely patent maxillary antrostomy and ethmoid cavity.  The sphenoethmoid recess was clear.  The frontal sinus outflow area was clear.  The middle turbinate was well medialized without adhesion.  The scope was removed.  The patient tolerated the procedure well.    Assessment and Plan     ICD-10-CM    1. Chronic pansinusitis  J32.4 NASAL/SINUS SCOPE W BIOPSY POLYPECT OR DEBRIDEMENT     triamcinolone (KENALOG-40) injection 80 mg   2. Nasal polyposis  J33.9 NASAL/SINUS SCOPE W BIOPSY POLYPECT OR DEBRIDEMENT     triamcinolone (KENALOG-40) injection 80 mg   3. Deviated nasal septum  J34.2 NASAL/SINUS SCOPE W BIOPSY POLYPECT OR DEBRIDEMENT     triamcinolone (KENALOG-40) injection 80 mg   4. Status post functional endoscopic sinus surgery  Z98.890 NASAL/SINUS SCOPE W BIOPSY POLYPECT OR DEBRIDEMENT     triamcinolone (KENALOG-40) injection 80 mg   5. Status post nasal septoplasty  Z98.890 NASAL/SINUS SCOPE W BIOPSY POLYPECT OR DEBRIDEMENT     triamcinolone (KENALOG-40) injection 80 mg   6. Postoperative state   Z98.890 NASAL/SINUS SCOPE W BIOPSY POLYPECT OR DEBRIDEMENT     triamcinolone (KENALOG-40) injection 80 mg   7. Tobacco dependence syndrome  F17.200    8. Encounter for tobacco use cessation counseling  Z71.6       It was my pleasure seeing Pop Garcia today in clinic. The patient is healing well after his endoscopic sinus surgery and septoplasty.  I debrided him today successfully in office.  He will continue his budesonide rinses.  His nose is still pretty inflamed.  We discussed a Kenalog injection today in office, 80 mg. We discussed the risks, benefits, options, goals of a intramuscular Kenalog injection today in office including, but not limited to: Risk of pain at injection site, risk of infection, side effects of systemic steroids including blood pressure problems, insulin resistance, weight gain, bone/joint issues, mood alteration.  Patient voiced understanding and is willing to proceed.    We discussed lifting restrictions of nose blowing and lifting.  I will see him back in 2 weeks for repeat examination and debridement.  Patient knows to contact me sooner with any problems or concerns.    Fam Raphael MD  Department of Otolarygology-Head and Neck Surgery  [facility]         Again, thank you for allowing me to participate in the care of your patient.        Sincerely,        Fam Raphael MD

## 2020-12-14 NOTE — NURSING NOTE
This laryngoscopy scope was used on this patient.    Rigid    Akash Storz Rigid #120FQM Adult 0 degree

## 2020-12-14 NOTE — NURSING NOTE
"Initial /79 (BP Location: Right arm, Patient Position: Chair, Cuff Size: Adult Large)   Pulse 83   Temp 98.4  F (36.9  C) (Tympanic)   Wt (!) 176.4 kg (389 lb)   BMI 47.35 kg/m   Estimated body mass index is 47.35 kg/m  as calculated from the following:    Height as of 9/14/20: 1.93 m (6' 4\").    Weight as of this encounter: 176.4 kg (389 lb). .    Gina Fernandes LPN    "

## 2020-12-15 ENCOUNTER — TELEPHONE (OUTPATIENT)
Dept: OTOLARYNGOLOGY | Facility: CLINIC | Age: 35
End: 2020-12-15

## 2020-12-15 NOTE — LETTER
[facility]  [address] [facility] [address]  [phone]  Dept: [phone]      December 16, 2020      Patient: Pop Garcia   YOB: 1985   Date of Visit: 12/15/2020       To Whom It May Concern:    Pop Garcia is a patient under my care at [facility].  The patient underwent a surgical procedure on 12/8/2020.  The patient can return to work with restrictions.  The patient should not lift anything more than 20 pounds for 14 days from surgery.  He can return to normal activity without restrictions at work starting 12/22/2020.  If you have any questions, feel free to contact me at [phone].    Regards,          Fam Raphael MD  Department of Otolarygology-Head and Neck Surgery  [facility]

## 2020-12-16 NOTE — TELEPHONE ENCOUNTER
Pt calling to check status getting return to work letter. Please call when ready for p/up    Please call    Kari Núñez  Specialty CSS

## 2020-12-31 ENCOUNTER — OFFICE VISIT (OUTPATIENT)
Dept: OTOLARYNGOLOGY | Facility: CLINIC | Age: 35
End: 2020-12-31
Payer: COMMERCIAL

## 2020-12-31 VITALS
HEART RATE: 91 BPM | WEIGHT: 315 LBS | DIASTOLIC BLOOD PRESSURE: 81 MMHG | BODY MASS INDEX: 47.35 KG/M2 | TEMPERATURE: 97.3 F | SYSTOLIC BLOOD PRESSURE: 144 MMHG

## 2020-12-31 DIAGNOSIS — J32.4 CHRONIC PANSINUSITIS: Primary | ICD-10-CM

## 2020-12-31 DIAGNOSIS — Z98.890 STATUS POST FUNCTIONAL ENDOSCOPIC SINUS SURGERY: ICD-10-CM

## 2020-12-31 DIAGNOSIS — F17.200 TOBACCO DEPENDENCE SYNDROME: ICD-10-CM

## 2020-12-31 DIAGNOSIS — Z71.6 ENCOUNTER FOR TOBACCO USE CESSATION COUNSELING: ICD-10-CM

## 2020-12-31 DIAGNOSIS — J33.9 NASAL POLYPOSIS: ICD-10-CM

## 2020-12-31 DIAGNOSIS — Z98.890 POSTOPERATIVE STATE: ICD-10-CM

## 2020-12-31 DIAGNOSIS — J34.2 DEVIATED NASAL SEPTUM: ICD-10-CM

## 2020-12-31 DIAGNOSIS — Z98.890 STATUS POST NASAL SEPTOPLASTY: ICD-10-CM

## 2020-12-31 PROCEDURE — 99024 POSTOP FOLLOW-UP VISIT: CPT | Performed by: OTOLARYNGOLOGY

## 2020-12-31 PROCEDURE — 31237 NSL/SINS NDSC SURG BX POLYPC: CPT | Mod: 50 | Performed by: OTOLARYNGOLOGY

## 2020-12-31 NOTE — LETTER
12/31/2020         RE: Pop Garcia  [address] [facility] [place] [address]        Dear Colleague,    Thank you for referring your patient, Pop Garcia, to the [facility]. Please see a copy of my visit note below.    Chief Complaint   Patient presents with     Post-op Visit     Post op sinus surgery - 12-8-20     History of Present Illness  Pop Garcia is a 35 year old male who presents today for follow-up.  The patient went to the operating room on 12/8/2020 for bilateral functional endoscopic sinus surgery and septoplasty.  He underwent nasal spur removal on 12/11/2020.  He was seen for follow-up on 12/14/2020 for nasal examination and debridement.  He was successfully debrided in office, however he had significant inflammation on examination.  He received a Kenalog injection we had continue his budesonide irrigations.  Patient returns today for follow-up, examination, and debridement.    The patient reports that he has been doing his irrigations.  He feels like the Kenalog injection significantly helped his symptoms.  He is slightly more plugged on the right-hand side. No significant pain or bleeding.  The patient has no other ENT related concerns..      Past Medical History  Patient Active Problem List   Diagnosis     Tobacco use disorder     Morbid obesity (H)     Reflux esophagitis     Left axis deviation     CARDIOVASCULAR SCREENING; LDL GOAL LESS THAN 130     Vitamin D deficiency     Attention deficit hyperactivity disorder (ADHD)     Marijuana use, continuous     Chronic frontal sinusitis     Chronic rhinitis     PVC's (premature ventricular contractions)     SILVERIO (generalized anxiety disorder)     Current Medications    Current Outpatient Medications:      budesonide (PULMICORT) 0.5 MG/2ML neb solution, Place 0.5 mg/2 mL budesonide vial in 8 oz normal saline sinus rinse bottle.  Irrigate each nostril with one half of the bottle twice daily., Disp: 360 mL, Rfl: 3     budesonide  (RHINOCORT ALLERGY) 32 MCG/ACT nasal spray, Spray 2 sprays into both nostrils daily, Disp: 3 Bottle, Rfl: 3     IBUPROFEN PO, , Disp: , Rfl:      ketoconazole (NIZORAL) 2 % external shampoo, Apply to affected area of damp skin, lather, leave on 5 minutes, and rinse (one application is usually sufficient), Disp: 120 mL, Rfl: 3     multivitamin w/minerals (MULTI-VITAMIN) tablet, Take 1 tablet by mouth daily, Disp: , Rfl:      omeprazole (PRILOSEC) 40 MG DR capsule, Take 1 capsule (40 mg) by mouth daily Take 20-30 minutes prior to morning meal, Disp: 90 capsule, Rfl: 1    Allergies  No Known Allergies    Social History  Social History     Socioeconomic History     Marital status: Single     Spouse name: Not on file     Number of children: Not on file     Years of education: Not on file     Highest education level: Not on file   Occupational History     Not on file   Social Needs     Financial resource strain: Not on file     Food insecurity     Worry: Not on file     Inability: Not on file     Transportation needs     Medical: Not on file     Non-medical: Not on file   Tobacco Use     Smoking status: Current Every Day Smoker     Packs/day: 1.00     Years: 10.00     Pack years: 10.00     Types: Cigarettes     Smokeless tobacco: Current User     Types: Chew   Substance and Sexual Activity     Alcohol use: Yes     Comment: weekends.      Drug use: No     Sexual activity: Yes     Partners: Female     Birth control/protection: Pill   Lifestyle     Physical activity     Days per week: Not on file     Minutes per session: Not on file     Stress: Not on file   Relationships     Social connections     Talks on phone: Not on file     Gets together: Not on file     Attends Bahai service: Not on file     Active member of club or organization: Not on file     Attends meetings of clubs or organizations: Not on file     Relationship status: Not on file     Intimate partner violence     Fear of current or ex partner: Not on file      Emotionally abused: Not on file     Physically abused: Not on file     Forced sexual activity: Not on file   Other Topics Concern     Parent/sibling w/ CABG, MI or angioplasty before 65F 55M? No   Social History Narrative     Not on file       Family History  Family History   Problem Relation Age of Onset     Diabetes Maternal Grandmother      Heart Disease Brother         hole in heart     Cerebrovascular Disease Maternal Grandfather      HELDER Maternal Grandfather      Alcoholism Paternal Grandfather      Cancer - colorectal No family hx of      Prostate Cancer No family hx of      Anesthesia Reaction No family hx of        Review of Systems  As per HPI and PMHx, otherwise 10 system review including the head and neck, constitutional, eyes, respiratory, GI, skin, neurologic, lymphatic, endocrine, and allergy systems is negative.    Physical Exam  BP (!) 144/81 (BP Location: Right arm, Patient Position: Sitting, Cuff Size: Adult Large)   Pulse 91   Temp 97.3  F (36.3  C) (Tympanic)   Wt (!) 176.4 kg (389 lb)   BMI 47.35 kg/m    GENERAL: Patient is a pleasant, cooperative 35 year old male in no acute distress.  HEAD: Normocephalic, atraumatic.  Hair and scalp are normal.  EYES: Pupils are equal, round, reactive to light and accommodation.  Extraocular movements are intact.  The sclera nonicteric without injection.  The extraocular structures are normal.  EARS: Normal shape and symmetry.  No tenderness when palpating the mastoid or tragal areas bilaterally.    NOSE: Nares are patent.  Nasal mucosa is pink and moist.  The nasal septum is shifted slightly to the right without any significant deviation.  No evidence of septal hematoma.  The inferior turbinates are well lateralized.    NEUROLOGIC: Cranial nerves II through XII are grossly intact.  Voice is strong.  Patient is House-Brackman I/VI bilaterally.  CARDIOVASCULAR: Extremities are warm and well-perfused.  No significant peripheral edema.  RESPIRATORY:  Patient has nonlabored breathing without cough, wheeze, stridor.  PSYCHIATRIC: Patient is alert and oriented.  Mood and affect appear normal.  SKIN: Warm and dry.  No scalp, face, or neck lesions noted.     Procedure: Nasal sinus debridement   Indication: Status post endoscopic sinus surgery     To improve medication delivery and assist with healing following sinus surgery, the nasal cavities were examined and debrided.  I proceeded with rigid nasal endoscopic examination and debridement.  The bilateral nasal cavities were anesthetized with 4% lidocaine and phenylephrine spray.  The bilateral nasal cavities were visualized with a 0 degree rigid endoscope.  The right nasal cavity was debrided with a rigid suction, bayonet forceps, and Blakesley forceps.  There does appear to be some adhesion of the middle turbinate to the lateral nasal wall very superiorly.  The patient had a widely patent maxillary antrostomy and ethmoid cavity.  The sphenoethmoid recess was clear.  The frontal sinus outflow area was clear.  Attention was turned to the left.  The left nasal cavity was debrided with a rigid suction, bayonet forceps, and Blakesley forceps.  The patient had a widely patent maxillary antrostomy and ethmoid cavity.  The sphenoethmoid recess was clear.  The frontal sinus outflow area was clear.  The middle turbinate was well medialized without adhesion.  The scope was removed.  The patient tolerated the procedure well.    Assessment and Plan     ICD-10-CM    1. Chronic pansinusitis  J32.4 NASAL/SINUS SCOPE W BIOPSY POLYPECT OR DEBRIDEMENT     budesonide (RHINOCORT ALLERGY) 32 MCG/ACT nasal spray   2. Nasal polyposis  J33.9 NASAL/SINUS SCOPE W BIOPSY POLYPECT OR DEBRIDEMENT     budesonide (RHINOCORT ALLERGY) 32 MCG/ACT nasal spray   3. Deviated nasal septum  J34.2 NASAL/SINUS SCOPE W BIOPSY POLYPECT OR DEBRIDEMENT     budesonide (RHINOCORT ALLERGY) 32 MCG/ACT nasal spray   4. Status post functional endoscopic sinus surgery   Z98.890 NASAL/SINUS SCOPE W BIOPSY POLYPECT OR DEBRIDEMENT     budesonide (RHINOCORT ALLERGY) 32 MCG/ACT nasal spray   5. Status post nasal septoplasty  Z98.890 NASAL/SINUS SCOPE W BIOPSY POLYPECT OR DEBRIDEMENT     budesonide (RHINOCORT ALLERGY) 32 MCG/ACT nasal spray   6. Postoperative state  Z98.890 NASAL/SINUS SCOPE W BIOPSY POLYPECT OR DEBRIDEMENT     budesonide (RHINOCORT ALLERGY) 32 MCG/ACT nasal spray   7. Tobacco dependence syndrome  F17.200 NASAL/SINUS SCOPE W BIOPSY POLYPECT OR DEBRIDEMENT     budesonide (RHINOCORT ALLERGY) 32 MCG/ACT nasal spray   8. Encounter for tobacco use cessation counseling  Z71.6 NASAL/SINUS SCOPE W BIOPSY POLYPECT OR DEBRIDEMENT     budesonide (RHINOCORT ALLERGY) 32 MCG/ACT nasal spray      It was my pleasure seeing Pop Garcia today in clinic.  The patient is healing well after his endoscopic sinus surgery and septoplasty.  I debrided him today successfully in office.   We discussed that he should continue budesonide rinses at least daily as many times a week as he can.  I provided with a prescription for the budesonide nasal spray that he can use if he is not able to rinse.  The patient knows to contact me if his symptoms are worsening.  We discussed making sure that he uses a nasal steroid at least once a day whether it is the spray or the rinse.  I can see the patient back as needed with any problems or concerns.    Pop to follow up with Primary Care provider regarding elevated blood pressure.    Fam Raphael MD  Department of Otolarygology-Head and Neck Surgery  [facility]         Again, thank you for allowing me to participate in the care of your patient.        Sincerely,        Fam Raphael MD

## 2020-12-31 NOTE — NURSING NOTE
"Initial BP (!) 144/81 (BP Location: Right arm, Patient Position: Sitting, Cuff Size: Adult Large)   Pulse 91   Temp 97.3  F (36.3  C) (Tympanic)   Wt (!) 176.4 kg (389 lb)   BMI 47.35 kg/m   Estimated body mass index is 47.35 kg/m  as calculated from the following:    Height as of 9/14/20: 1.93 m (6' 4\").    Weight as of this encounter: 176.4 kg (389 lb). .    Melodie Luke CMA    "

## 2020-12-31 NOTE — PROGRESS NOTES
Chief Complaint   Patient presents with     Post-op Visit     Post op sinus surgery - 12-8-20     History of Present Illness  Pop Garcia is a 35 year old male who presents today for follow-up.  The patient went to the operating room on 12/8/2020 for bilateral functional endoscopic sinus surgery and septoplasty.  He underwent nasal spur removal on 12/11/2020.  He was seen for follow-up on 12/14/2020 for nasal examination and debridement.  He was successfully debrided in office, however he had significant inflammation on examination.  He received a Kenalog injection we had continue his budesonide irrigations.  Patient returns today for follow-up, examination, and debridement.    The patient reports that he has been doing his irrigations.  He feels like the Kenalog injection significantly helped his symptoms.  He is slightly more plugged on the right-hand side. No significant pain or bleeding.  The patient has no other ENT related concerns..      Past Medical History  Patient Active Problem List   Diagnosis     Tobacco use disorder     Morbid obesity (H)     Reflux esophagitis     Left axis deviation     CARDIOVASCULAR SCREENING; LDL GOAL LESS THAN 130     Vitamin D deficiency     Attention deficit hyperactivity disorder (ADHD)     Marijuana use, continuous     Chronic frontal sinusitis     Chronic rhinitis     PVC's (premature ventricular contractions)     SILVERIO (generalized anxiety disorder)     Current Medications    Current Outpatient Medications:      budesonide (PULMICORT) 0.5 MG/2ML neb solution, Place 0.5 mg/2 mL budesonide vial in 8 oz normal saline sinus rinse bottle.  Irrigate each nostril with one half of the bottle twice daily., Disp: 360 mL, Rfl: 3     budesonide (RHINOCORT ALLERGY) 32 MCG/ACT nasal spray, Spray 2 sprays into both nostrils daily, Disp: 3 Bottle, Rfl: 3     IBUPROFEN PO, , Disp: , Rfl:      ketoconazole (NIZORAL) 2 % external shampoo, Apply to affected area of damp skin, lather, leave on 5  minutes, and rinse (one application is usually sufficient), Disp: 120 mL, Rfl: 3     multivitamin w/minerals (MULTI-VITAMIN) tablet, Take 1 tablet by mouth daily, Disp: , Rfl:      omeprazole (PRILOSEC) 40 MG DR capsule, Take 1 capsule (40 mg) by mouth daily Take 20-30 minutes prior to morning meal, Disp: 90 capsule, Rfl: 1    Allergies  No Known Allergies    Social History  Social History     Socioeconomic History     Marital status: Single     Spouse name: Not on file     Number of children: Not on file     Years of education: Not on file     Highest education level: Not on file   Occupational History     Not on file   Social Needs     Financial resource strain: Not on file     Food insecurity     Worry: Not on file     Inability: Not on file     Transportation needs     Medical: Not on file     Non-medical: Not on file   Tobacco Use     Smoking status: Current Every Day Smoker     Packs/day: 1.00     Years: 10.00     Pack years: 10.00     Types: Cigarettes     Smokeless tobacco: Current User     Types: Chew   Substance and Sexual Activity     Alcohol use: Yes     Comment: weekends.      Drug use: No     Sexual activity: Yes     Partners: Female     Birth control/protection: Pill   Lifestyle     Physical activity     Days per week: Not on file     Minutes per session: Not on file     Stress: Not on file   Relationships     Social connections     Talks on phone: Not on file     Gets together: Not on file     Attends Catholic service: Not on file     Active member of club or organization: Not on file     Attends meetings of clubs or organizations: Not on file     Relationship status: Not on file     Intimate partner violence     Fear of current or ex partner: Not on file     Emotionally abused: Not on file     Physically abused: Not on file     Forced sexual activity: Not on file   Other Topics Concern     Parent/sibling w/ CABG, MI or angioplasty before 65F 55M? No   Social History Narrative     Not on file        Family History  Family History   Problem Relation Age of Onset     Diabetes Maternal Grandmother      Heart Disease Brother         hole in heart     Cerebrovascular Disease Maternal Grandfather      FRANKIEASHENA. Maternal Grandfather      Alcoholism Paternal Grandfather      Cancer - colorectal No family hx of      Prostate Cancer No family hx of      Anesthesia Reaction No family hx of        Review of Systems  As per HPI and PMHx, otherwise 10 system review including the head and neck, constitutional, eyes, respiratory, GI, skin, neurologic, lymphatic, endocrine, and allergy systems is negative.    Physical Exam  BP (!) 144/81 (BP Location: Right arm, Patient Position: Sitting, Cuff Size: Adult Large)   Pulse 91   Temp 97.3  F (36.3  C) (Tympanic)   Wt (!) 176.4 kg (389 lb)   BMI 47.35 kg/m    GENERAL: Patient is a pleasant, cooperative 35 year old male in no acute distress.  HEAD: Normocephalic, atraumatic.  Hair and scalp are normal.  EYES: Pupils are equal, round, reactive to light and accommodation.  Extraocular movements are intact.  The sclera nonicteric without injection.  The extraocular structures are normal.  EARS: Normal shape and symmetry.  No tenderness when palpating the mastoid or tragal areas bilaterally.    NOSE: Nares are patent.  Nasal mucosa is pink and moist.  The nasal septum is shifted slightly to the right without any significant deviation.  No evidence of septal hematoma.  The inferior turbinates are well lateralized.    NEUROLOGIC: Cranial nerves II through XII are grossly intact.  Voice is strong.  Patient is House-Brackman I/VI bilaterally.  CARDIOVASCULAR: Extremities are warm and well-perfused.  No significant peripheral edema.  RESPIRATORY: Patient has nonlabored breathing without cough, wheeze, stridor.  PSYCHIATRIC: Patient is alert and oriented.  Mood and affect appear normal.  SKIN: Warm and dry.  No scalp, face, or neck lesions noted.     Procedure: Nasal sinus debridement    Indication: Status post endoscopic sinus surgery     To improve medication delivery and assist with healing following sinus surgery, the nasal cavities were examined and debrided.  I proceeded with rigid nasal endoscopic examination and debridement.  The bilateral nasal cavities were anesthetized with 4% lidocaine and phenylephrine spray.  The bilateral nasal cavities were visualized with a 0 degree rigid endoscope.  The right nasal cavity was debrided with a rigid suction, bayonet forceps, and Blakesley forceps.  There does appear to be some adhesion of the middle turbinate to the lateral nasal wall very superiorly.  The patient had a widely patent maxillary antrostomy and ethmoid cavity.  The sphenoethmoid recess was clear.  The frontal sinus outflow area was clear.  Attention was turned to the left.  The left nasal cavity was debrided with a rigid suction, bayonet forceps, and Blakesley forceps.  The patient had a widely patent maxillary antrostomy and ethmoid cavity.  The sphenoethmoid recess was clear.  The frontal sinus outflow area was clear.  The middle turbinate was well medialized without adhesion.  The scope was removed.  The patient tolerated the procedure well.    Assessment and Plan     ICD-10-CM    1. Chronic pansinusitis  J32.4 NASAL/SINUS SCOPE W BIOPSY POLYPECT OR DEBRIDEMENT     budesonide (RHINOCORT ALLERGY) 32 MCG/ACT nasal spray   2. Nasal polyposis  J33.9 NASAL/SINUS SCOPE W BIOPSY POLYPECT OR DEBRIDEMENT     budesonide (RHINOCORT ALLERGY) 32 MCG/ACT nasal spray   3. Deviated nasal septum  J34.2 NASAL/SINUS SCOPE W BIOPSY POLYPECT OR DEBRIDEMENT     budesonide (RHINOCORT ALLERGY) 32 MCG/ACT nasal spray   4. Status post functional endoscopic sinus surgery  Z98.890 NASAL/SINUS SCOPE W BIOPSY POLYPECT OR DEBRIDEMENT     budesonide (RHINOCORT ALLERGY) 32 MCG/ACT nasal spray   5. Status post nasal septoplasty  Z98.890 NASAL/SINUS SCOPE W BIOPSY POLYPECT OR DEBRIDEMENT     budesonide (RHINOCORT  ALLERGY) 32 MCG/ACT nasal spray   6. Postoperative state  Z98.890 NASAL/SINUS SCOPE W BIOPSY POLYPECT OR DEBRIDEMENT     budesonide (RHINOCORT ALLERGY) 32 MCG/ACT nasal spray   7. Tobacco dependence syndrome  F17.200 NASAL/SINUS SCOPE W BIOPSY POLYPECT OR DEBRIDEMENT     budesonide (RHINOCORT ALLERGY) 32 MCG/ACT nasal spray   8. Encounter for tobacco use cessation counseling  Z71.6 NASAL/SINUS SCOPE W BIOPSY POLYPECT OR DEBRIDEMENT     budesonide (RHINOCORT ALLERGY) 32 MCG/ACT nasal spray      It was my pleasure seeing Pop Garcia today in clinic.  The patient is healing well after his endoscopic sinus surgery and septoplasty.  I debrided him today successfully in office.   We discussed that he should continue budesonide rinses at least daily as many times a week as he can.  I provided with a prescription for the budesonide nasal spray that he can use if he is not able to rinse.  The patient knows to contact me if his symptoms are worsening.  We discussed making sure that he uses a nasal steroid at least once a day whether it is the spray or the rinse.  I can see the patient back as needed with any problems or concerns.    Pop to follow up with Primary Care provider regarding elevated blood pressure.    Fam Raphael MD  Department of Otolarygology-Head and Neck Surgery  [facility]

## 2020-12-31 NOTE — PROGRESS NOTES
This laryngoscopy scope was used on this patient.    Rigid    Akash Storz Rigid #[id number] Pediatric/Adult/Post-op sinus  Melodie Luke CMA

## 2020-12-31 NOTE — PATIENT INSTRUCTIONS
Per physician instructions      If you have questions or concerns on any instructions given to you by your provider today or if you need to schedule an appointment, you can reach us at [phone].

## 2021-05-24 ENCOUNTER — HOSPITAL ENCOUNTER (EMERGENCY)
Facility: CLINIC | Age: 36
Discharge: HOME | End: 2021-05-24
Attending: NURSE PRACTITIONER | Admitting: NURSE PRACTITIONER

## 2021-05-24 VITALS
DIASTOLIC BLOOD PRESSURE: 91 MMHG | SYSTOLIC BLOOD PRESSURE: 164 MMHG | OXYGEN SATURATION: 97 % | RESPIRATION RATE: 20 BRPM | TEMPERATURE: 98.8 F | HEART RATE: 91 BPM

## 2021-05-24 DIAGNOSIS — S61.210A LACERATION OF RIGHT INDEX FINGER, INITIAL ENCOUNTER: ICD-10-CM

## 2021-05-24 PROCEDURE — 12001 RPR S/N/AX/GEN/TRNK 2.5CM/<: CPT | Performed by: NURSE PRACTITIONER

## 2021-05-24 PROCEDURE — 99213 OFFICE O/P EST LOW 20 MIN: CPT | Mod: 25 | Performed by: NURSE PRACTITIONER

## 2021-05-24 PROCEDURE — G0463 HOSPITAL OUTPT CLINIC VISIT: HCPCS | Performed by: NURSE PRACTITIONER

## 2021-05-24 NOTE — ED PROVIDER NOTES
History     Chief Complaint   Patient presents with     Laceration     right index finger. bleeding controlled  tdap 2018     HPI  Pop Garcia is a 35 year old male who presents with a work-related injury of a right index finger laceration. 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 Patient states he was sliding a piece of plastic by a piece of steel with subsequent laceration on the dorsal side of his right index finger.  Patient denies loss of sensation, range of motion. Patient reports mild pain.    Patient denies fever, aches, chills, sweats, ear pain, eye pain, throat pain, chest pain, cough, wheezing, shortness of breath, abdominal pain, nausea, vomiting, diarrhea,  dysuria, speech difficulty, left or right-sided body weakness, mental confusion, thoughts of harming self.  Patient reports feeling well otherwise    Allergies:  No Known Allergies    Problem List:    Patient Active Problem List    Diagnosis Date Noted     SILVERIO (generalized anxiety disorder) 02/19/2020     Priority: Medium     PVC's (premature ventricular contractions) 02/12/2020     Priority: Medium     Chronic frontal sinusitis 12/04/2015     Priority: Medium     Chronic rhinitis 12/04/2015     Priority: Medium     Marijuana use, continuous 10/06/2015     Priority: Medium     Attention deficit hyperactivity disorder (ADHD) 10/07/2014     Priority: Medium     tested at [facility] counseling on 9/17/14 - diagnosis ADHD combined type, severe. paperwork scanned to chart   Problem list name updated by automated process. Provider to review       Vitamin D deficiency 06/25/2012     Priority: Medium     Left axis deviation 01/10/2011     Priority: Medium     CARDIOVASCULAR SCREENING; LDL GOAL LESS THAN 130 01/10/2011     Priority: Medium     Reflux esophagitis 11/28/2006     Priority: Medium     - happens daily.  Makes him throw up.  Was on nexium 40  Mg two times a day  in past.  Never omeprazole. Never anything else.     EGD  10/06 -  LA Grade C reflux esophagitis.                 - [Congenital] dilation in the entire esophagus.                 - The examination was suspicious for an esophageal motility                  Disorder.  - subsequent motility tests - normal        Tobacco use disorder 09/26/2005     Priority: Medium     Still - smokes 1/2 ppd x 9 years.         Morbid obesity (H) 09/26/2005     Priority: Medium        Past Medical History:    Past Medical History:   Diagnosis Date     Attention deficit disorder with hyperactivity(314.01)      Chronic depressive personality disorder      Juvenile osteochondrosis of lower extremity, excluding foot      Morbid obesity (H)      Oppositional defiant disorder of childhood or adolescence      TOBACCO ABUSE-CONTINUOUS        Past Surgical History:    Past Surgical History:   Procedure Laterality Date     LASIK CUSTOMVUE BILATERAL  8/15/2012    Procedure: LASIK CUSTOMVUE BILATERAL;  BILATERAL LASIK CUSTOMVUE  WITH INTRALASE ;  Surgeon: Luis Johnson MD;  Location: [facility]     SURGICAL HISTORY OF -   1991    tonsils & adenoids     WAVESCAN SCREENING  8/15/2012    Procedure: WAVESCAN SCREENING;  BILATERAL WAVESCAN SCREENING ;  Surgeon: Luis Johnson MD;  Location: [facility]     ZZ GASTROSCOPY,FL  10/2006    eosphagitis,  nonpatent sphincter       Family History:    Family History   Problem Relation Age of Onset     Diabetes Maternal Grandmother      Heart Disease Brother         hole in heart     Cerebrovascular Disease Maternal Grandfather      C.A.D. Maternal Grandfather      Alcoholism Paternal Grandfather      Cancer - colorectal No family hx of      Prostate Cancer No family hx of      Anesthesia Reaction No family hx of        Social History:  Marital Status:  Single [1]  Social History     Tobacco Use     Smoking status: Current Every Day Smoker     Packs/day: 1.00     Years: 10.00     Pack years: 10.00     Types: Cigarettes     Smokeless tobacco: Current User     Types: Chew   Substance  Use Topics     Alcohol use: Yes     Comment: weekends.      Drug use: No        Medications:    IBUPROFEN PO        Review of Systems  As mentioned above in the history present illness. All other systems were reviewed and are negative.    Physical Exam   BP: (!) 164/91  Pulse: 91  Temp: 98.8  F (37.1  C)  Resp: 20  SpO2: 97 %      Physical Exam  Vitals signs and nursing note reviewed.   Constitutional:       General: He is not in acute distress.     Appearance: Normal appearance. He is well-developed. He is obese. He is not ill-appearing, toxic-appearing or diaphoretic.   HENT:      Head: Normocephalic and atraumatic.      Right Ear: External ear normal.      Left Ear: External ear normal.      Nose: Nose normal.   Eyes:      General:         Right eye: No discharge.         Left eye: No discharge.      Conjunctiva/sclera: Conjunctivae normal.   Cardiovascular:      Rate and Rhythm: Normal rate and regular rhythm.      Heart sounds: Normal heart sounds. No murmur. No friction rub. No gallop.    Pulmonary:      Effort: Pulmonary effort is normal.      Breath sounds: Normal breath sounds. No stridor. No wheezing.   Musculoskeletal:      Right hand: He exhibits laceration (1 cm by 3 mm dorsal verticl laceration noted between MIP joint and DIP joint without bony, muscle or tendon involvement- neurovascularly stable). He exhibits normal range of motion, no tenderness, no bony tenderness, normal two-point discrimination, normal capillary refill, no deformity and no swelling. Normal sensation noted. Decreased sensation is not present in the ulnar distribution, is not present in the medial distribution and is not present in the radial distribution. He exhibits no finger abduction.   Skin:     General: Skin is warm.      Findings: No rash.   Neurological:      Mental Status: He is alert and oriented to person, place, and time.         ED [facility]    -Laceration Repair    Date/Time:  5/24/2021 8:37 PM  Performed by: Amparo Mcdowell APRN CNP  Authorized by: Amparo Mcdowell APRN CNP       ANESTHESIA (see MAR for exact dosages):     Anesthesia method:  Local infiltration    Local anesthetic:  Lidocaine 1% w/o epi  LACERATION DETAILS     Location:  Finger    Length (cm):  1    Depth (mm):  3    REPAIR TYPE:     Repair type:  Simple      EXPLORATION:     Hemostasis achieved with:  Direct pressure    Wound exploration: wound explored through full range of motion and entire depth of wound probed and visualized      Wound extent: no foreign body, no signs of injury, no nerve damage, no tendon damage, no underlying fracture and no vascular damage      Contaminated: no      TREATMENT:     Area cleansed with:  Soap and water    Amount of cleaning:  Standard    Irrigation solution:  Tap water    Irrigation volume:  200    Irrigation method:  Tap    Visualized foreign bodies/material removed: no      SKIN REPAIR     Repair method:  Sutures    Suture size:  4-0    Suture material:  Nylon    Suture technique:  Simple interrupted    Number of sutures:  1    APPROXIMATION     Approximation:  Close    POST-PROCEDURE DETAILS     Dressing:  Antibiotic ointment and adhesive bandage      PROCEDURE   Patient Tolerance:  Patient tolerated the procedure well with no immediate complications          No results found for this or any previous visit (from the past 24 hour(s)).    Medications - No data to display    Assessments & Plan (with Medical Decision Making)  Pop Garcia is a 35 year old male who presents with a work-related injury of a right index finger laceration. 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 Patient states he was sliding a piece of plastic by a piece of steel with subsequent laceration on the dorsal side of his right index finger.  On exam, patient is 1 cm x 3 mm laceration without any bony, ligamentous, muscle involvement that is neurovascularly stable. Laceration repaired  with 1 suture. Patient tolerated the procedure well. Workability form filled out. Recommend follow-up in 8 to 9 days for suture removal. Patient discharged in stable condition.     I have reviewed the nursing notes.    I have reviewed the findings, diagnosis, plan and need for follow up with the patient.    Discharge Medication List as of 5/24/2021  7:14 PM          Final diagnoses:   Laceration of right index finger, initial encounter       5/24/2021   [facility] EMERGENCY DEPT     Amparo Mcdowell, KELSY CNP  05/24/21 2037

## 2021-05-25 NOTE — DISCHARGE INSTRUCTIONS
Keep the wound clean and dry.  Apply Aquaphor something similar twice daily to the wound.  Wash the wound and pat dry prior to application of the Aquaphor.  Follow-up on June 1 for reevaluation and suture removal.

## 2021-05-26 ENCOUNTER — ANCILLARY PROCEDURE (OUTPATIENT)
Dept: GENERAL RADIOLOGY | Facility: CLINIC | Age: 36
End: 2021-05-26
Attending: PODIATRIST
Payer: COMMERCIAL

## 2021-05-26 ENCOUNTER — OFFICE VISIT (OUTPATIENT)
Dept: PODIATRY | Facility: CLINIC | Age: 36
End: 2021-05-26
Payer: COMMERCIAL

## 2021-05-26 VITALS
DIASTOLIC BLOOD PRESSURE: 81 MMHG | HEIGHT: 76 IN | BODY MASS INDEX: 38.36 KG/M2 | SYSTOLIC BLOOD PRESSURE: 125 MMHG | WEIGHT: 315 LBS | HEART RATE: 83 BPM

## 2021-05-26 DIAGNOSIS — G89.29 CHRONIC HEEL PAIN, LEFT: Primary | ICD-10-CM

## 2021-05-26 DIAGNOSIS — M79.89 MASS OF SOFT TISSUE OF FOOT: ICD-10-CM

## 2021-05-26 DIAGNOSIS — M79.672 CHRONIC HEEL PAIN, LEFT: Primary | ICD-10-CM

## 2021-05-26 PROCEDURE — 73650 X-RAY EXAM OF HEEL: CPT | Mod: LT | Performed by: RADIOLOGY

## 2021-05-26 PROCEDURE — 99203 OFFICE O/P NEW LOW 30 MIN: CPT | Performed by: PODIATRIST

## 2021-05-26 ASSESSMENT — MIFFLIN-ST. JEOR: SCORE: 2805.99

## 2021-05-26 ASSESSMENT — PAIN SCALES - GENERAL: PAINLEVEL: SEVERE PAIN (7)

## 2021-05-26 NOTE — LETTER
"    5/26/2021         RE: Pop Garcia  [address] [facility] [address]        Dear Colleague,    Thank you for referring your patient, Pop Garcia, to the [facility] [place]. Please see a copy of my visit note below.    PATIENT HISTORY:  Pop Garcia is a 35 year old male who presents to clinic  with a chief complaint of heel pain.  The patient relates pain with touch and swelling.  The patient relates the pain is primarily located around the back of left heel.  The patient relates that the problem has been going on for six months and is getting worse.  The patient relates trying ibuprofen with ice with little relief.  The patient is currently employed as a supervisor.   Any previous notes and studies that pertain to the patient's condition were reviewed.    Pertinent medical, surgical and family history was reviewed in Epic chart and include morbid obesity, Vitamin D deficiency    Medications:   Current Outpatient Medications:      IBUPROFEN PO, , Disp: , Rfl:      Allergies:  No Known Allergies    Vitals: /81   Pulse 83   Ht 1.93 m (6' 4\")   Wt (!) 176.4 kg (389 lb)   BMI 47.35 kg/m    BMI= Body mass index is 47.35 kg/m .    LOWER EXTREMITY PHYSICAL EXAM    Dermatologic: Skin is intact to left lower extremities without significant lesions, rash or abrasion.        Vascular: DP & PT pulses are intact & regular on the left.   CFT and skin temperature is normal to the left lower extremities.     Neurologic: Lower extremity sensation is intact to light touch.  No evidence of weakness in the left lower extremities.        Musculoskeletal: Patient is ambulatory without assistive device or brace.  No gross ankle deformity noted.  No foot or ankle joint effusion is noted.  Noted pain on palpation over a prominent soft tissue mass over the plantarlateral aspect of the left heel.  No surrounding erythema noted.  No drainage noted.    Diagnostics:  Radiographs included calcaneal " lateral and axial views of the left foot demonstrating   no cortical erosions or periosteal elevation.  All joint margins appear stable.  There is no apparent fracture or tumor formation noted.  There is no evidence of foreign body.  The images were independently reviewed by myself along with the patient explaining the findings.      ASSESSMENT / PLAN:     ICD-10-CM    1. Chronic heel pain, left  M79.672 XR Calcaneus Left G/E 2 Views    G89.29 MR Ankle Left w/o Contrast     Ankle/Foot Bracing Supplies DME   2. Mass of soft tissue of foot  M79.89 MR Ankle Left w/o Contrast     Ankle/Foot Bracing Supplies DME       I have explained to Pop about the conditions.  We discussed the underlying contributing factors of the condition as well as both conservative and surgical treatment options along with expected length of recovery.  At this time, the patient was fitted with a silicone heel cup.  The patient will remain out of work for the rest of the week to rest the left heel.  The patient was ordered an MRI of the left ankle to evaluate the soft tissue mass on the left heel.  The patient will be notified of the results and told to come in after for further evaluation and treatment options.    Pop verbalized agreement with and understanding of the rational for the diagnosis and treatment plan.  All questions were answered to best of my ability and the patient's satisfaction. The patient was advised to contact the clinic with any questions that may arise after the clinic visit.      Disclaimer: This note consists of symbols derived from keyboarding, dictation and/or voice recognition software. As a result, there may be errors in the script that have gone undetected. Please consider this when interpreting information found in this chart.       LOS Lund.P.GABRIELLA., F.A.C.F.A.S.        Again, thank you for allowing me to participate in the care of your patient.        Sincerely,        Sherwin Petersen DPM

## 2021-05-26 NOTE — LETTER
May 26, 2021      Pop Garcia  [address] [facility] [address]        To Whom It May Concern:    Pop Garcia was seen in our clinic.  He will need to take the rest of the week off due to a painful left heel soft tissue mass.  He may return to work this coming Tuesday.    Sincerely,        Sherwin Petersen DPM

## 2021-05-26 NOTE — NURSING NOTE
"Chief Complaint   Patient presents with     Consult     left heel pain       Initial /81   Pulse 83   Ht 1.93 m (6' 4\")   Wt (!) 176.4 kg (389 lb)   BMI 47.35 kg/m   Estimated body mass index is 47.35 kg/m  as calculated from the following:    Height as of this encounter: 1.93 m (6' 4\").    Weight as of this encounter: 176.4 kg (389 lb).  Medications and allergies reviewed.      Reva MADISON MA    "

## 2021-05-26 NOTE — PATIENT INSTRUCTIONS
SMOKING CESSATION  What's in cigarette smoke? - Cigarette smoke contains over 4,000 chemicals. Nicotine is one of the main ingredients which is an insecticide/herbicide. It is poisonous to our nervous system, increases blood clotting risk, and decreases the body's defenses to fight off infection. Another chemical is Carbon Monoxide is an asphyxiating gas that permanently binds to blood cells and blocks the transport of oxygen. This leads to tissue death and decreases your metabolism. Tar is a chemical that coats your lungs and trachea which impairs new oxygen coming in and carbon dioxide getting out of your body.   How does smoking impact surgery? - Smoking is particularly hazardous with regards to surgery. Surgery puts stress on the body and a smoker's body is already under strain from these chemicals. Putting the two together, especially for an elective surgery, could be a recipe for disaster. Smoking before and after surgery increases your risk of heart problems, slow wound healing, delayed bone healing, blood clots, wound infection and anesthesia complications.   What are the benefits of quitting? - In 20 minutes your blood pressure will drop back down to normal. In 8 hours the carbon monoxide (a toxic gas) levels in your blood stream will drop by half, and oxygen levels will return to normal. In 48 hours your chance of having a heart attack will have decreased. All nicotine will have left your body. Your sense of taste and smell will return to a normal level. In 72 hours your bronchial tubes will relax, and your energy levels will increase. In 2 weeks your circulation will increase, and it will continue to improve for the next 10 weeks.    Recommendations for elective surgery - Ideally, patients should quit smoking 8 weeks before and at least 2 weeks after elective surgery in order to avoid complications. Simply cutting back on the amount of cigarettes smoked per day does not offer any benefit or decrease the  risk of poor wound healing, heart problems, and infection. Smokers should also start taking Vitamin C and B for two weeks before surgery and two weeks after surgery.    Ways to Stop Smokin. Nicotine patches, lozenges, or gum  2. Support Groups  3. Medications (see below)    List of Medications:  1. Varenicline Tartrate (CHANTIX)   2. Bupropion HCL (WELLBUTRIN, ZYBAN) - note: make sure Wellbutrin is for smoking cessation and not other issues   3. Nicotine Patch (NICODERM)   4. Nicotine Inhaler (NICOTROL)   5. Nicotine Gum Nicotine Polacrilex   6. Nicotine Lozenge: Nicotine Polacrilex (COMMIT)   * [facility] offers a smoking support group as well!  Please visit: [email or web address]  If you are interested in these, ask about getting a prescription or talk to your primary care doctor about what may be the best way for you to quit.

## 2021-05-26 NOTE — PROGRESS NOTES
"PATIENT HISTORY:  Pop Garcia is a 35 year old male who presents to clinic  with a chief complaint of heel pain.  The patient relates pain with touch and swelling.  The patient relates the pain is primarily located around the back of left heel.  The patient relates that the problem has been going on for six months and is getting worse.  The patient relates trying ibuprofen with ice with little relief.  The patient is currently employed as a supervisor.   Any previous notes and studies that pertain to the patient's condition were reviewed.    Pertinent medical, surgical and family history was reviewed in Epic chart and include morbid obesity, Vitamin D deficiency    Medications:   Current Outpatient Medications:      IBUPROFEN PO, , Disp: , Rfl:      Allergies:  No Known Allergies    Vitals: /81   Pulse 83   Ht 1.93 m (6' 4\")   Wt (!) 176.4 kg (389 lb)   BMI 47.35 kg/m    BMI= Body mass index is 47.35 kg/m .    LOWER EXTREMITY PHYSICAL EXAM    Dermatologic: Skin is intact to left lower extremities without significant lesions, rash or abrasion.        Vascular: DP & PT pulses are intact & regular on the left.   CFT and skin temperature is normal to the left lower extremities.     Neurologic: Lower extremity sensation is intact to light touch.  No evidence of weakness in the left lower extremities.        Musculoskeletal: Patient is ambulatory without assistive device or brace.  No gross ankle deformity noted.  No foot or ankle joint effusion is noted.  Noted pain on palpation over a prominent soft tissue mass over the plantarlateral aspect of the left heel.  No surrounding erythema noted.  No drainage noted.    Diagnostics:  Radiographs included calcaneal lateral and axial views of the left foot demonstrating   no cortical erosions or periosteal elevation.  All joint margins appear stable.  There is no apparent fracture or tumor formation noted.  There is no evidence of foreign body.  The images were " independently reviewed by myself along with the patient explaining the findings.      ASSESSMENT / PLAN:     ICD-10-CM    1. Chronic heel pain, left  M79.672 XR Calcaneus Left G/E 2 Views    G89.29 MR Ankle Left w/o Contrast     Ankle/Foot Bracing Supplies DME   2. Mass of soft tissue of foot  M79.89 MR Ankle Left w/o Contrast     Ankle/Foot Bracing Supplies DME       I have explained to Pop about the conditions.  We discussed the underlying contributing factors of the condition as well as both conservative and surgical treatment options along with expected length of recovery.  At this time, the patient was fitted with a silicone heel cup.  The patient will remain out of work for the rest of the week to rest the left heel.  The patient was ordered an MRI of the left ankle to evaluate the soft tissue mass on the left heel.  The patient will be notified of the results and told to come in after for further evaluation and treatment options.    Pop verbalized agreement with and understanding of the rational for the diagnosis and treatment plan.  All questions were answered to best of my ability and the patient's satisfaction. The patient was advised to contact the clinic with any questions that may arise after the clinic visit.      Disclaimer: This note consists of symbols derived from keyboarding, dictation and/or voice recognition software. As a result, there may be errors in the script that have gone undetected. Please consider this when interpreting information found in this chart.       JESSICA Petersen D.P.M., FRADHA.F.A.S.

## 2021-06-02 ENCOUNTER — ALLIED HEALTH/NURSE VISIT (OUTPATIENT)
Dept: FAMILY MEDICINE | Facility: CLINIC | Age: 36
End: 2021-06-02

## 2021-06-02 DIAGNOSIS — Z48.02 ENCOUNTER FOR REMOVAL OF SUTURES: Primary | ICD-10-CM

## 2021-06-02 PROCEDURE — 99207 PR NO CHARGE NURSE ONLY: CPT

## 2021-06-02 NOTE — PROGRESS NOTES
Pop Garcia presents to the clinic today for removal of sutures and suture.  The patient has had the sutures and suture in place for 9 days.  There has been no history of infection or drainage.  One suture and suture are seen located on the right hand.      The wound is healing well with no signs of infection.      Tetanus status is up to date.       Suture was easily removed today.    Routine wound care discussed.  The patient will follow up as needed.

## 2022-11-02 ENCOUNTER — HOSPITAL ENCOUNTER (EMERGENCY)
Facility: CLINIC | Age: 37
Discharge: LEFT BEFORE DISCHARGE | End: 2022-11-02
Payer: COMMERCIAL

## 2022-11-05 NOTE — TELEPHONE ENCOUNTER
Patient was seen yesterday by Dr. Alcaraz and did get a shot of what he thinks is an antibiotic. He states today his face is more swollen around his eye. Wondering if Dr. Alcaraz would want to see him sooner than this afternoon. Please advise.    Martine [facility]      Never